# Patient Record
Sex: FEMALE | Race: ASIAN | NOT HISPANIC OR LATINO | Employment: FULL TIME | URBAN - METROPOLITAN AREA
[De-identification: names, ages, dates, MRNs, and addresses within clinical notes are randomized per-mention and may not be internally consistent; named-entity substitution may affect disease eponyms.]

---

## 2017-01-26 ENCOUNTER — HOSPITAL ENCOUNTER (OUTPATIENT)
Dept: RADIOLOGY | Facility: OTHER | Age: 37
Discharge: HOME | End: 2017-01-26
Attending: RADIOLOGY
Payer: COMMERCIAL

## 2017-01-26 DIAGNOSIS — D33.4 BENIGN TUMOR OF SPINAL CORD: ICD-10-CM

## 2017-01-26 PROCEDURE — 72156 MRI NECK SPINE W/O & W/DYE: CPT | Mod: 26,,, | Performed by: RADIOLOGY

## 2017-01-26 PROCEDURE — 72156 MRI NECK SPINE W/O & W/DYE: CPT | Mod: TC

## 2017-01-26 PROCEDURE — A9585 GADOBUTROL INJECTION: HCPCS | Performed by: RADIOLOGY

## 2017-01-26 PROCEDURE — 25500020 PHARM REV CODE 255: Performed by: RADIOLOGY

## 2017-01-26 RX ORDER — GADOBUTROL 604.72 MG/ML
10 INJECTION INTRAVENOUS
Status: COMPLETED | OUTPATIENT
Start: 2017-01-26 | End: 2017-01-26

## 2017-01-26 RX ADMIN — GADOBUTROL 10 ML: 604.72 INJECTION INTRAVENOUS at 11:01

## 2017-01-30 ENCOUNTER — OFFICE VISIT (OUTPATIENT)
Dept: RADIATION ONCOLOGY | Facility: CLINIC | Age: 37
End: 2017-01-30
Attending: RADIOLOGY
Payer: COMMERCIAL

## 2017-01-30 VITALS
HEIGHT: 66 IN | WEIGHT: 233 LBS | SYSTOLIC BLOOD PRESSURE: 114 MMHG | DIASTOLIC BLOOD PRESSURE: 76 MMHG | BODY MASS INDEX: 37.45 KG/M2 | RESPIRATION RATE: 16 BRPM | HEART RATE: 77 BPM

## 2017-01-30 DIAGNOSIS — D33.4 BENIGN TUMOR OF SPINAL CORD: Primary | ICD-10-CM

## 2017-01-30 PROCEDURE — 99499 UNLISTED E&M SERVICE: CPT | Mod: S$GLB,,, | Performed by: RADIOLOGY

## 2017-01-30 PROCEDURE — 99999 PR PBB SHADOW E&M-EST. PATIENT-LVL III: CPT | Mod: PBBFAC,,, | Performed by: RADIOLOGY

## 2017-01-30 NOTE — PROGRESS NOTES
Subjective:       Patient ID: Ann Godfrey is a 36 y.o. female.    Chief Complaint: malignant tumor of spinal cord (f/u after xrt)    HPI Comments: This patient returns for follow up visit.     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 She initially presented in 2008 with pain and numbness in the Lt. arm and leg. The patient was living in [place] at the time.  MRI at that time revealed a intramedullary spinal cord abnormality at the C2 level. She underwent laminectomy followed by a biopsy of the lesion which reportedly revealed a low grade glioma. She elected to proceed with active surveillance. The patient relocated to [place] and presented to [facility] in 2013 to establish care.  Recently she presented with increasing pain in the Lt. upper and lower extremities. Repeat MRI scan on 7/15/16 revealed a 1.4 x 0.7 x 2.4 cm T2 hyperintense lesion with mild expansion of the cord seen at the C2-C3 level. The lesion previously measured 0.4 x 0.7 x 0.6 mm. She was referred to our depart and completed a course of 54 Gy in 30 fractions from C1 - C4 vertebral bodies on 10/17/16.  Today, the patient states she continues to note numbness in the Lt. hand and Lower left leg.  There is also intermittent pain in the lateral aspect of the Rt. lower leg.  There is some weakness in the Lt. .  Overall, her symptoms are stable.        Review of Systems   Constitutional: Positive for fatigue. Negative for activity change, appetite change, chills and fever.   Respiratory: Negative for cough.    Cardiovascular: Negative for chest pain and palpitations.   Neurological: Positive for weakness and numbness. Negative for dizziness, tremors, seizures, facial asymmetry, speech difficulty, light-headedness and headaches.       Objective:      Physical Exam   Constitutional: She is oriented to person, place, and time. She appears well-developed and well-nourished. No distress.    Neurological: She is alert and oriented to person, place, and time. No cranial nerve deficit. GCS eye subscore is 4. GCS verbal subscore is 5. GCS motor subscore is 6.   Reflex Scores:       Tricep reflexes are 1+ on the right side and 1+ on the left side.       Bicep reflexes are 1+ on the right side and 1+ on the left side.       Patellar reflexes are 1+ on the right side and 1+ on the left side.  4.5 out of 5 strength in the Lt. .  5 of 5 in the remaining Rt. and Lt. upper extremities.  4 of 5 strength in the plantar flexion on the Lt.         Repeat MRI of the cervical spine on 1/26/17 revealed No significant interval change in the intradural lesion at C2-C3 the lesion still measures 1.4 cm x 7 mm.   Assessment:       1. Benign tumor of spinal cord        Plan:       Stable following radiotherapy.  Will plan repeat MRI and follow up in 6 months.

## 2017-02-15 ENCOUNTER — PATIENT MESSAGE (OUTPATIENT)
Dept: OBSTETRICS AND GYNECOLOGY | Facility: CLINIC | Age: 37
End: 2017-02-15

## 2017-03-01 ENCOUNTER — PATIENT MESSAGE (OUTPATIENT)
Dept: OBSTETRICS AND GYNECOLOGY | Facility: CLINIC | Age: 37
End: 2017-03-01

## 2017-03-02 ENCOUNTER — TELEPHONE (OUTPATIENT)
Dept: OBSTETRICS AND GYNECOLOGY | Facility: CLINIC | Age: 37
End: 2017-03-02

## 2017-03-02 NOTE — TELEPHONE ENCOUNTER
Returned pt call. She took a home pregnancy test, it was positive. Her LMP was 1/10/17. She wants to see Dr. Weber. The next available is 3/22. She is scheduled for 3/22 @ 8:45 @ [facility]. She will be 10 weeks. She wants to know if that was too long to come in, due to complications in the past. She complains of lower abdominal pain . It can get up to a 7 at times. She couldn't describe her pain. She said the pain is bearable. She said her pain comes and goes. I told her if the pain persist, she needs to go to the ER. I told her Dr. Weber will be out of the office until Monday. Pt verbalized understanding.

## 2017-03-07 ENCOUNTER — TELEPHONE (OUTPATIENT)
Dept: OBSTETRICS AND GYNECOLOGY | Facility: CLINIC | Age: 37
End: 2017-03-07

## 2017-03-07 NOTE — TELEPHONE ENCOUNTER
----- Message from Sharmila Veloz sent at 3/7/2017 11:20 AM CST -----  Contact: DYLLAN RIVERA [[id number]]  x_  1st Request  _  2nd Request  _  3rd Request        Who: DYLLAN RIVERA [[id number]]    Why: patient is returning a call     What Number to Call Back: [phone]    When to Expect a call back: (Before the end of the day)   -- if call after 3:00 call back will be tomorrow.

## 2017-03-08 NOTE — TELEPHONE ENCOUNTER
----- Message from Christian Molina sent at 3/7/2017  4:38 PM CST -----  Pt returning your call [phone]

## 2017-03-09 ENCOUNTER — TELEPHONE (OUTPATIENT)
Dept: OBSTETRICS AND GYNECOLOGY | Facility: CLINIC | Age: 37
End: 2017-03-09

## 2017-03-09 NOTE — TELEPHONE ENCOUNTER
----- Message from Valarie Spain MA sent at 3/9/2017  9:07 AM CST -----  Contact: patient   Patient states she was returning a call to [place]. Patient can best be reached at [phone]

## 2017-03-10 ENCOUNTER — TELEPHONE (OUTPATIENT)
Dept: OBSTETRICS AND GYNECOLOGY | Facility: CLINIC | Age: 37
End: 2017-03-10

## 2017-03-10 NOTE — TELEPHONE ENCOUNTER
----- Message from Christian Molina sent at 3/10/2017 10:42 AM CST -----  PT RETURNING YOUR CALL [phone]

## 2017-03-22 ENCOUNTER — OFFICE VISIT (OUTPATIENT)
Dept: OBSTETRICS AND GYNECOLOGY | Facility: CLINIC | Age: 37
End: 2017-03-22
Attending: OBSTETRICS & GYNECOLOGY
Payer: MEDICAID

## 2017-03-22 ENCOUNTER — LAB VISIT (OUTPATIENT)
Dept: LAB | Facility: OTHER | Age: 37
End: 2017-03-22
Attending: OBSTETRICS & GYNECOLOGY
Payer: MEDICAID

## 2017-03-22 VITALS
DIASTOLIC BLOOD PRESSURE: 80 MMHG | WEIGHT: 233 LBS | HEIGHT: 66 IN | SYSTOLIC BLOOD PRESSURE: 130 MMHG | BODY MASS INDEX: 37.45 KG/M2

## 2017-03-22 DIAGNOSIS — Z32.01 POSITIVE PREGNANCY TEST: ICD-10-CM

## 2017-03-22 DIAGNOSIS — N91.2 AMENORRHEA: Primary | ICD-10-CM

## 2017-03-22 LAB
ABO + RH BLD: NORMAL
B-HCG UR QL: POSITIVE
BASOPHILS # BLD AUTO: 0.02 K/UL
BASOPHILS NFR BLD: 0.3 %
BLD GP AB SCN CELLS X3 SERPL QL: NORMAL
CTP QC/QA: YES
DIFFERENTIAL METHOD: ABNORMAL
EOSINOPHIL # BLD AUTO: 0.2 K/UL
EOSINOPHIL NFR BLD: 3 %
ERYTHROCYTE [DISTWIDTH] IN BLOOD BY AUTOMATED COUNT: 13.7 %
HBV SURFACE AG SERPL QL IA: NEGATIVE
HCT VFR BLD AUTO: 38.8 %
HGB BLD-MCNC: 12.8 G/DL
HIV 1+2 AB+HIV1 P24 AG SERPL QL IA: NEGATIVE
LYMPHOCYTES # BLD AUTO: 1.3 K/UL
LYMPHOCYTES NFR BLD: 17.3 %
MCH RBC QN AUTO: 29 PG
MCHC RBC AUTO-ENTMCNC: 33 %
MCV RBC AUTO: 88 FL
MONOCYTES # BLD AUTO: 0.3 K/UL
MONOCYTES NFR BLD: 3.8 %
NEUTROPHILS # BLD AUTO: 5.8 K/UL
NEUTROPHILS NFR BLD: 75.3 %
PLATELET # BLD AUTO: 165 K/UL
PMV BLD AUTO: 12.3 FL
RBC # BLD AUTO: 4.42 M/UL
RPR SER QL: NORMAL
WBC # BLD AUTO: 7.73 K/UL

## 2017-03-22 PROCEDURE — 87591 N.GONORRHOEAE DNA AMP PROB: CPT

## 2017-03-22 PROCEDURE — 86762 RUBELLA ANTIBODY: CPT

## 2017-03-22 PROCEDURE — 87624 HPV HI-RISK TYP POOLED RSLT: CPT

## 2017-03-22 PROCEDURE — 87186 SC STD MICRODIL/AGAR DIL: CPT

## 2017-03-22 PROCEDURE — 86703 HIV-1/HIV-2 1 RESULT ANTBDY: CPT

## 2017-03-22 PROCEDURE — 86850 RBC ANTIBODY SCREEN: CPT

## 2017-03-22 PROCEDURE — 81025 URINE PREGNANCY TEST: CPT | Mod: PBBFAC | Performed by: OBSTETRICS & GYNECOLOGY

## 2017-03-22 PROCEDURE — 1160F RVW MEDS BY RX/DR IN RCRD: CPT | Mod: ,,, | Performed by: OBSTETRICS & GYNECOLOGY

## 2017-03-22 PROCEDURE — 86592 SYPHILIS TEST NON-TREP QUAL: CPT

## 2017-03-22 PROCEDURE — 76801 OB US < 14 WKS SINGLE FETUS: CPT | Mod: 26,S$PBB,, | Performed by: PEDIATRICS

## 2017-03-22 PROCEDURE — 99213 OFFICE O/P EST LOW 20 MIN: CPT | Mod: TH,S$PBB,, | Performed by: OBSTETRICS & GYNECOLOGY

## 2017-03-22 PROCEDURE — 87340 HEPATITIS B SURFACE AG IA: CPT

## 2017-03-22 PROCEDURE — 85025 COMPLETE CBC W/AUTO DIFF WBC: CPT

## 2017-03-22 PROCEDURE — 86900 BLOOD TYPING SEROLOGIC ABO: CPT

## 2017-03-22 PROCEDURE — 87086 URINE CULTURE/COLONY COUNT: CPT

## 2017-03-22 PROCEDURE — 88175 CYTOPATH C/V AUTO FLUID REDO: CPT

## 2017-03-22 PROCEDURE — 87088 URINE BACTERIA CULTURE: CPT

## 2017-03-22 PROCEDURE — 99213 OFFICE O/P EST LOW 20 MIN: CPT | Mod: PBBFAC,25 | Performed by: OBSTETRICS & GYNECOLOGY

## 2017-03-22 PROCEDURE — 86901 BLOOD TYPING SEROLOGIC RH(D): CPT

## 2017-03-22 PROCEDURE — 99999 PR PBB SHADOW E&M-EST. PATIENT-LVL III: CPT | Mod: PBBFAC,,, | Performed by: OBSTETRICS & GYNECOLOGY

## 2017-03-22 PROCEDURE — 87077 CULTURE AEROBIC IDENTIFY: CPT

## 2017-03-22 NOTE — MR AVS SNAPSHOT
"    [facility] [address]  [address]  [address]  [facility] [address]  Phone: [phone]  Fax: [phone]                  Ann Godfrey   3/22/2017 8:45 AM   Office Visit    Description:  Female : 1980   Provider:  Yamileth Weber MD   Department:  [facility] [address]           Reason for Visit     Amenorrhea     Morning Sickness           Diagnoses this Visit        Comments    Amenorrhea    -  Primary     Positive pregnancy test                To Do List           Goals (5 Years of Data)     None      Follow-Up and Disposition     Return in about 4 weeks (around 2017).      [facility] On Call     [facility] On Call Nurse Care Line -  Assistance  Registered nurses in the [facility] On Call Center provide clinical advisement, health education, appointment booking, and other advisory services.  Call for this free service at [phone].             Medications           Message regarding Medications     Verify the changes and/or additions to your medication regime listed below are the same as discussed with your clinician today.  If any of these changes or additions are incorrect, please notify your healthcare provider.        STOP taking these medications     gabapentin (NEURONTIN) 100 MG capsule Take 1 capsule (100 mg total) by mouth 3 (three) times daily.           Verify that the below list of medications is an accurate representation of the medications you are currently taking.  If none reported, the list may be blank. If incorrect, please contact your healthcare provider. Carry this list with you in case of emergency.           Current Medications     PRENATAL VIT #76/IRON,CARB/FA (PNV 29-1 ORAL) Take 1 tablet by mouth once daily.           Clinical Reference Information           Your Vitals Were     BP Height Weight Last Period BMI    130/80 5' 6" (1.676 m) 105.7 kg (233 lb 0.4 oz) 01/10/2017 (Exact Date) 37.61 kg/m2      Blood Pressure          Most Recent Value "    BP  130/80      Allergies as of 3/22/2017     No Known Allergies      Immunizations Administered on Date of Encounter - 3/22/2017     None      Orders Placed During Today's Visit      Normal Orders This Visit    C. trachomatis/N. gonorrhoeae by AMP DNA Urine     HPV High Risk Genotypes, PCR     Liquid-based pap smear, screening     POCT urine pregnancy     Urine culture     Future Labs/Procedures Expected by Expires    CBC auto differential  3/22/2017 3/22/2018    Hepatitis B surface antigen  3/22/2017 3/22/2018    HIV-1 and HIV-2 antibodies  3/22/2017 3/22/2018    RPR  3/22/2017 3/22/2018    Rubella antibody, IgG  3/22/2017 3/22/2018    Type & Screen - Ob Profile  As directed 4/21/2017    US OB/GYN Procedure ([facility])  As directed 3/22/2018         3/22/2017  9:11 AM - Moj[facility] Carrillo LPN      Component Results     Component Value Flag Ref Range Units Status    POC Preg Test, Ur Positive (A) Negative  Final     Acceptable Yes    Final            Language Assistance Services     ATTENTION: Language assistance services are available, free of charge. Please call [phone].      ATENCIÓN: Si habla español, tiene a moses disposición servicios gratuitos de asistencia lingüística. Llame al [phone].     CHÚ Ý: N?u b?n nói Ti?ng Vi?t, có các d?ch v? h? tr? ngôn ng? mi?n phí dành cho b?n. G?i s? [phone].         [facility] [address] complies with applicable Federal civil rights laws and does not discriminate on the basis of race, color, national origin, age, disability, or sex.

## 2017-03-22 NOTE — PROGRESS NOTES
SUBJECTIVE:   36 y.o. female  complains of amenorrhea.  +UPT.  She is taking a pnv.        Past Medical History:   Diagnosis Date    Arthritis     spinal    Asthma     was on advair , resolved    Benign tumor of spinal cord     Depression     GERD (gastroesophageal reflux disease)      Past Surgical History:   Procedure Laterality Date    APPENDECTOMY       SECTION      posteriorlaminectomy      For benign spinal tumor;      Social History     Social History    Marital status:      Spouse name: N/A    Number of children: N/A    Years of education: N/A     Occupational History    Not on file.     Social History Main Topics    Smoking status: Former Smoker     Packs/day: 1.00     Years: 13.00     Types: Cigarettes     Quit date: 10/20/2012    Smokeless tobacco: Former User    Alcohol use No    Drug use: No    Sexual activity: Yes     Partners: Male     Birth control/ protection: None     Other Topics Concern    Not on file     Social History Narrative     Family History   Problem Relation Age of Onset    Liver disease Father     Breast cancer Neg Hx     Colon cancer Neg Hx     Ovarian cancer Neg Hx     Stroke Neg Hx     Diabetes Neg Hx     Hypertension Neg Hx      OB History    Para Term  AB SAB TAB Ectopic Multiple Living   2 1 1 0 0 0 0 0 0 1      # Outcome Date GA Lbr King/2nd Weight Sex Delivery Anes PTL Lv   2 Current            1 Term 14 40w5d  4.326 kg (9 lb 8.6 oz) F CS-LTranv EPI N Y            Current Outpatient Prescriptions   Medication Sig Dispense Refill    PRENATAL VIT #76/IRON,CARB/FA (PNV 29-1 ORAL) Take 1 tablet by mouth once daily.       No current facility-administered medications for this visit.      Allergies: Review of patient's allergies indicates no known allergies.     ROS:  Constitutional: no weight loss, weight gain, fever, fatigue  Eyes:  No vision changes, glasses/contacts  ENT/Mouth: No ulcers, sinus problems, ears  "ringing, headache  Cardiovascular: No inability to lie flat, chest pain, exercise intolerance, swelling, heart palpitations  Respiratory: No wheezing, coughing blood, shortness of breath, or cough  Gastrointestinal: No diarrhea, bloody stool, nausea/vomiting, constipation, gas, hemorrhoids  Genitourinary: No blood in urine, painful urination, urgency of urination, frequency of urination, incomplete emptying, incontinence, abnormal bleeding, painful periods, heavy periods, vaginal discharge, vaginal odor, painful intercourse, sexual problems, bleeding after intercourse.  Musculoskeletal: No muscle weakness  Skin/Breast: No painful breasts, nipple discharge, masses, rash, ulcers  Neurological: No passing out, seizures, numbness, headache  Endocrine: No diabetes, hypothyroid, hyperthyroid, hot flashes, hair loss, abnormal hair growth, ance  Psychiatric: No depression, crying  Hematologic: No bruises, bleeding, swollen lymph nodes, anemia.      OBJECTIVE:   The patient appears well, alert, oriented x 3, in no distress.  /80  Ht 5' 6" (1.676 m)  Wt 105.7 kg (233 lb 0.4 oz)  LMP 01/10/2017 (Exact Date)  BMI 37.61 kg/m2  NECK: no thyromegaly, trachea midline  SKIN: no acne, striae, hirsutism  BREAST EXAM: breasts appear normal, no suspicious masses, no skin or nipple changes or axillary nodes  ABDOMEN: no hernias, masses, or hepatosplenomegaly  GENITALIA: normal external genitalia, no erythema, no discharge  URETHRA: normal urethra, normal urethral meatus  VAGINA: Normal  CERVIX: no lesions or cervical motion tenderness  UTERUS: enlarged, 10 weeks size  ADNEXA: normal adnexa and no mass, fullness, tenderness    \  ASSESSMENT:   Ann was seen today for amenorrhea and morning sickness.    Diagnoses and all orders for this visit:    Amenorrhea  -     POCT urine pregnancy  -     Liquid-based pap smear, screening  -     HPV High Risk Genotypes, PCR    Positive pregnancy test  -     HIV-1 and HIV-2 antibodies; " Future  -     RPR; Future  -     Type & Screen - Ob Profile; Future  -     Hepatitis B surface antigen; Future  -     Rubella antibody, IgG; Future  -     Urine culture  -     C. trachomatis/N. gonorrhoeae by AMP DNA Urine  -     CBC auto differential; Future  -     US OB/GYN Procedure ([facility]); Future      Counseled to avoid cat litter, not garden without gloves, avoid raw meat, heat up deli meat, to eat large fish like tuna no more than once a week, and to avoid soft unpasteurized cheeses.  I recommend a PNV daily.  She should avoid ibuprofen.  Will discuss genetic screening after ultrasound.

## 2017-03-22 NOTE — PROCEDURES
Procedures Indication  ========    Estimation of gestational age.    Method  ======    Transabdominal ultrasound examination. View: Sufficient. Suboptimal view: limited by maternal body habitus.    Pregnancy  =========    Padron pregnancy. Number of fetuses: 1.    Dating  ======    LMP on: 1/10/2017  GA by LMP 10 w + 1 d  ABIEL by LMP: 10/17/2017  Ultrasound examination on: 3/22/2017  GA by U/S based upon: CRL  GA by U/S 11 w + 1 d  ABIEL by U/S: 10/10/2017  Assigned: Dating performed on 03/22/2017, based on the LMP  Assigned GA 10 w + 1 d  Assigned ABIEL: 10/17/2017    General Evaluation  ==============    Cardiac activity: present.    Fetal Biometry  ============    CRL 41.8 mm 98% 11w 1d Hadlock   bpm 64% Nicolaides    Fetal Anatomy  ============    The following structures appear normal:  Abdominal wall.    The following structures could not be adequately visualized:  Cranium. Thorax. GI tract. Urogenital tract.    The following structures were visualized:  Arms. Legs.    Maternal Structures  ===============    Uterus / Cervix  Uterus: Normal  Ovaries / Tubes / Adnexa  Rt ovary: Normal  Rt ovary D1 4.4 cm  Rt ovary D2 3.5 cm  Rt ovary D3 4.2 cm  Rt ovary mean 4.0 cm  Rt ovary vol 33.0 cm³  Rt ovarian cyst(s): Cysts identified  Findings: Simple cyst  D1 26.0 mm  D2 29.0 mm  D3 24.0 mm  Mean 26.3 mm  Vol 9.475 cm³  Lt ovary: Normal  Lt ovary D1 3.2 cm  Lt ovary D2 3.1 cm  Lt ovary D3 2.1 cm  Lt ovary mean 2.8 cm  Lt ovary vol 10.6 cm³  Pouch of Brendon / Other Structures  Free fluid: No free fluid visualized    Impression  =========    Single viable intrauterine pregnancy consistent with LMP dating per ACOG criteria.    Embryo grossly WNL with normal cardiac activity.    Normal uterus, cervix and adnexae as noted above.  No fluid seen in cul-de-sac.    Recommendation  ==============    Suggest repeat scan as you feel clinically indicated.

## 2017-03-23 LAB
C TRACH DNA SPEC QL NAA+PROBE: NOT DETECTED
N GONORRHOEA DNA SPEC QL NAA+PROBE: NOT DETECTED
RUBV IGG SER-ACNC: 25.6 IU/ML
RUBV IGG SER-IMP: REACTIVE

## 2017-03-24 LAB
HPV16 DNA SPEC QL NAA+PROBE: NEGATIVE
HPV16+18+H RISK 12 DNA CVX-IMP: NEGATIVE
HPV18 DNA SPEC QL NAA+PROBE: NEGATIVE

## 2017-03-25 LAB — BACTERIA UR CULT: NORMAL

## 2017-03-27 ENCOUNTER — PATIENT MESSAGE (OUTPATIENT)
Dept: OBSTETRICS AND GYNECOLOGY | Facility: CLINIC | Age: 37
End: 2017-03-27

## 2017-03-27 RX ORDER — NITROFURANTOIN (MACROCRYSTALS) 100 MG/1
100 CAPSULE ORAL EVERY 6 HOURS
Qty: 28 CAPSULE | Refills: 0 | Status: SHIPPED | OUTPATIENT
Start: 2017-03-27 | End: 2017-04-19

## 2017-04-19 ENCOUNTER — INITIAL PRENATAL (OUTPATIENT)
Dept: OBSTETRICS AND GYNECOLOGY | Facility: CLINIC | Age: 37
End: 2017-04-19
Attending: OBSTETRICS & GYNECOLOGY
Payer: COMMERCIAL

## 2017-04-19 VITALS — WEIGHT: 233 LBS | DIASTOLIC BLOOD PRESSURE: 80 MMHG | BODY MASS INDEX: 37.61 KG/M2 | SYSTOLIC BLOOD PRESSURE: 120 MMHG

## 2017-04-19 DIAGNOSIS — O99.212 OBESITY COMPLICATING PREGNANCY, SECOND TRIMESTER: ICD-10-CM

## 2017-04-19 DIAGNOSIS — O09.92 PREGNANCY, SUPERVISION, HIGH-RISK, SECOND TRIMESTER: Primary | ICD-10-CM

## 2017-04-19 DIAGNOSIS — C72.0 GLIOMA OF SPINAL CORD: ICD-10-CM

## 2017-04-19 PROBLEM — O99.210 OBESITY COMPLICATING PREGNANCY: Status: ACTIVE | Noted: 2017-04-19

## 2017-04-19 PROBLEM — O09.90 PREGNANCY, SUPERVISION, HIGH-RISK: Status: ACTIVE | Noted: 2017-04-19

## 2017-04-19 PROCEDURE — 99999 PR PBB SHADOW E&M-EST. PATIENT-LVL III: CPT | Mod: PBBFAC,,, | Performed by: OBSTETRICS & GYNECOLOGY

## 2017-04-19 PROCEDURE — 99213 OFFICE O/P EST LOW 20 MIN: CPT | Mod: TH,S$PBB,, | Performed by: OBSTETRICS & GYNECOLOGY

## 2017-04-19 PROCEDURE — 99213 OFFICE O/P EST LOW 20 MIN: CPT | Mod: PBBFAC | Performed by: OBSTETRICS & GYNECOLOGY

## 2017-04-19 NOTE — MR AVS SNAPSHOT
[facility] [place] [address]  [address]  [address]  [facility] [address]  Phone: [phone]  Fax: [phone]                  Ann Godfrey   2017 9:00 AM   Initial Prenatal    Description:  Female : 1980   Provider:  Yamileth Weber MD   Department:  [facility] [place] [address]           Reason for Visit     Routine Prenatal Visit           Diagnoses this Visit        Comments    Glioma of spinal cord    -  Primary     Pregnancy, supervision, high-risk, second trimester         Obesity complicating pregnancy, second trimester                To Do List           Goals (5 Years of Data)     None      Follow-Up and Disposition     Return in about 4 weeks (around 2017).      [facility] On Call     [facility] On Call Nurse Care Line -  Assistance  Unless otherwise directed by your provider, please contact [facility] On-Call, our nurse care line that is available for  assistance.     Registered nurses in the [facility] On Call Center provide: appointment scheduling, clinical advisement, health education, and other advisory services.  Call: [phone] (toll free)               Medications           Message regarding Medications     Verify the changes and/or additions to your medication regime listed below are the same as discussed with your clinician today.  If any of these changes or additions are incorrect, please notify your healthcare provider.        STOP taking these medications     nitrofurantoin (MACRODANTIN) 100 MG capsule Take 1 capsule (100 mg total) by mouth every 6 (six) hours.           Verify that the below list of medications is an accurate representation of the medications you are currently taking.  If none reported, the list may be blank. If incorrect, please contact your healthcare provider. Carry this list with you in case of emergency.           Current Medications     PRENATAL VIT #76/IRON,CARB/FA (PNV 29-1 ORAL) Take 1 tablet by mouth once daily.            Clinical Reference Information           Prenatal Vitals     Enc. Date GA Prenatal Vitals Prenatal Pulse Pain Level Urine Albumin/Glucose Edema Presentation Dilation/Effacement/Station    17 14w1d 120/80 / 105.7 kg (233 lb 0.4 oz)   0 Negative / Negative None / None / None         TW.012 kg (0.4 oz)   Pregravid weight: 105.7 kg (233 lb)   Number of fetuses: 1       Your Vitals Were     BP Weight Last Period BMI       120/80 105.7 kg (233 lb 0.4 oz) 01/10/2017 (Exact Date) 37.61 kg/m2       Allergies as of 2017     No Known Allergies      Immunizations Administered on Date of Encounter - 2017     None      Orders Placed During Today's Visit      Normal Orders This Visit    Ambulatory consult to Maternal Fetal Medicine     Ambulatory consult to OB Anesthesiology     Future Labs/Procedures Expected by Expires    [facility] Procedure ([facility])  As directed 2018      Language Assistance Services     ATTENTION: Language assistance services are available, free of charge. Please call [phone].      ATENCIÓN: Si habla kp, tiene a moses disposición servicios gratuitos de asistencia lingüística. Llame al [phone].     CHÚ Ý: N?u b?n nói Ti?ng Vi?t, có các d?ch v? h? tr? ngôn ng? mi?n phí dành cho b?n. G?i s? [phone].         [facility] [address] complies with applicable Federal civil rights laws and does not discriminate on the basis of race, color, national origin, age, disability, or sex.

## 2017-05-17 ENCOUNTER — ANESTHESIA (OUTPATIENT)
Dept: ANESTHESIOLOGY | Facility: OTHER | Age: 37
End: 2017-05-17

## 2017-05-17 ENCOUNTER — ROUTINE PRENATAL (OUTPATIENT)
Dept: OBSTETRICS AND GYNECOLOGY | Facility: CLINIC | Age: 37
End: 2017-05-17
Attending: OBSTETRICS & GYNECOLOGY
Payer: COMMERCIAL

## 2017-05-17 ENCOUNTER — OFFICE VISIT (OUTPATIENT)
Dept: ANESTHESIOLOGY | Facility: OTHER | Age: 37
End: 2017-05-17
Attending: OBSTETRICS & GYNECOLOGY
Payer: MEDICAID

## 2017-05-17 ENCOUNTER — ANESTHESIA EVENT (OUTPATIENT)
Dept: ANESTHESIOLOGY | Facility: OTHER | Age: 37
End: 2017-05-17

## 2017-05-17 VITALS
SYSTOLIC BLOOD PRESSURE: 100 MMHG | DIASTOLIC BLOOD PRESSURE: 76 MMHG | OXYGEN SATURATION: 98 % | WEIGHT: 238.13 LBS | BODY MASS INDEX: 38.43 KG/M2 | DIASTOLIC BLOOD PRESSURE: 72 MMHG | HEART RATE: 78 BPM | SYSTOLIC BLOOD PRESSURE: 120 MMHG

## 2017-05-17 DIAGNOSIS — O99.212 OBESITY COMPLICATING PREGNANCY, SECOND TRIMESTER: ICD-10-CM

## 2017-05-17 DIAGNOSIS — C71.9 GLIOMA: ICD-10-CM

## 2017-05-17 DIAGNOSIS — O09.92 PREGNANCY, SUPERVISION, HIGH-RISK, SECOND TRIMESTER: Primary | ICD-10-CM

## 2017-05-17 DIAGNOSIS — O34.219 HISTORY OF CESAREAN SECTION COMPLICATING PREGNANCY: ICD-10-CM

## 2017-05-17 PROCEDURE — 0502F SUBSEQUENT PRENATAL CARE: CPT | Mod: S$GLB,,, | Performed by: OBSTETRICS & GYNECOLOGY

## 2017-05-17 PROCEDURE — 99999 PR PBB SHADOW E&M-EST. PATIENT-LVL II: CPT | Mod: PBBFAC,,, | Performed by: OBSTETRICS & GYNECOLOGY

## 2017-05-17 NOTE — MR AVS SNAPSHOT
[facility] [address]  [address]  [address]  [facility] [address]  Phone: [phone]  Fax: [phone]                  Ann Godfrey   2017 8:45 AM   Routine Prenatal    Description:  Female : 1980   Provider:  Yamileth Weber MD   Department:  [facility] [address]           Reason for Visit     Routine Prenatal Visit     Epistaxis           Diagnoses this Visit        Comments    Pregnancy, supervision, high-risk, second trimester    -  Primary     Obesity complicating pregnancy, second trimester         History of  section complicating pregnancy                To Do List           Future Appointments        Provider Department Dept Phone    2017 8:45 AM Yamileth Weber MD [facility] [address] [phone]    2017 9:40 AM ULTRASOUND, [facility] 4TH FLR ON CALL [facility] Maternal Fetal Med [phone]      Goals (5 Years of Data)     None      Follow-Up and Disposition     Return in about 4 weeks (around 2017).      [facility] On Call     [facility] On Call Nurse Care Line -  Assistance  Unless otherwise directed by your provider, please contact [facility] On-Call, our nurse care line that is available for  assistance.     Registered nurses in the [facility] On Call Center provide: appointment scheduling, clinical advisement, health education, and other advisory services.  Call: [phone] (toll free)               Medications           Message regarding Medications     Verify the changes and/or additions to your medication regime listed below are the same as discussed with your clinician today.  If any of these changes or additions are incorrect, please notify your healthcare provider.             Verify that the below list of medications is an accurate representation of the medications you are currently taking.  If none reported, the list may be blank. If incorrect, please contact your healthcare provider. Carry this list with you in case of  emergency.           Current Medications     PRENATAL VIT #76/IRON,CARB/FA (PNV 29-1 ORAL) Take 1 tablet by mouth once daily.           Clinical Reference Information           Prenatal Vitals     Enc. Date GA Prenatal Vitals Prenatal Pulse Pain Level Urine Albumin/Glucose Edema Presentation Dilation/Effacement/Station    17 18w1d 100/72 / 108 kg (238 lb 1.6 oz)   0 Negative / Negative       17 14w1d 120/80 / 105.7 kg (233 lb 0.4 oz)   0 Negative / Negative None / None / None         TW.312 kg (5 lb 1.6 oz)   Pregravid weight: 105.7 kg (233 lb)   Number of fetuses: 1       Your Vitals Were     BP Weight Last Period BMI       100/72 108 kg (238 lb 1.6 oz) 01/10/2017 (Exact Date) 38.43 kg/m2       Allergies as of 2017     No Known Allergies      Immunizations Administered on Date of Encounter - 2017     None      Language Assistance Services     ATTENTION: Language assistance services are available, free of charge. Please call [phone].      ATENCIÓN: Si habla español, tiene a moses disposición servicios gratuitos de asistencia lingüística. Llame al [phone].     CHÚ Ý: N?u b?n nói Ti?ng Vi?t, có các d?ch v? h? tr? ngôn ng? mi?n phí dành cho b?n. G?i s? [phone].         [facility] [address] complies with applicable Federal civil rights laws and does not discriminate on the basis of race, color, national origin, age, disability, or sex.

## 2017-05-17 NOTE — CONSULTS
Consults     [facility]    Date:    2017  8:56 AM     Anesthesia Consult: outpatient    Initial Consultation: Yes - cervical spinal cord glioma    Requested by: Obstetrician / MFM - Yamileth Weber  Consult documentation sent back to physician.      Chief Complaint: spinal cord glioma with associated intermittent left sided numbness/paresis    Diagnosis: spinal cord glioma    Reason for Consult: Anesthetic recommendations for delivery    Allergies:  Review of patient's allergies indicates no known allergies.    History of Present Illness:    Patient is a 36 years old,  female, Previous pregnancies: 1.  Live births: 1.  Miscarriages: 0 . Elective abortions: 0. diagnosed with benign cervical spinal cord glioma.      Past medical history:    Past Medical History:   Diagnosis Date    Arthritis     spinal    Asthma     was on advair , resolved    Benign tumor of spinal cord     Depression     GERD (gastroesophageal reflux disease)        Past surgical history:    Past Surgical History:   Procedure Laterality Date    APPENDECTOMY       SECTION      posteriorlaminectomy      For benign spinal tumor;        Family history:    Family History   Problem Relation Age of Onset    Liver disease Father     Breast cancer Neg Hx     Colon cancer Neg Hx     Ovarian cancer Neg Hx     Stroke Neg Hx     Diabetes Neg Hx     Hypertension Neg Hx        Social History:    Social History     Social History    Marital status:      Spouse name: N/A    Number of children: N/A    Years of education: N/A     Occupational History    Not on file.     Social History Main Topics    Smoking status: Former Smoker     Packs/day: 1.00     Years: 13.00     Types: Cigarettes     Quit date: 10/20/2012    Smokeless tobacco: Former User    Alcohol use No    Drug use: No    Sexual activity: Yes     Partners: Male     Birth control/ protection: None     Other Topics Concern    Not on file      Social History Narrative     Medication:    Current Outpatient Prescriptions on File Prior to Visit   Medication Sig Dispense Refill    PRENATAL VIT #76/IRON,CARB/FA (PNV 29-1 ORAL) Take 1 tablet by mouth once daily.       No current facility-administered medications on file prior to visit.          Past anesthesia history:    Hx of general anesthesia problems: No    Diagnostic Studies    I have reviewed the following. Relevant findings as noted:    Blood group: O POS   CBC:   Lab Results   Component Value Date    WBC 7.73 03/22/2017    RBC 4.42 03/22/2017    HGB 12.8 03/22/2017    HCT 38.8 03/22/2017     03/22/2017     BMP:   Lab Results   Component Value Date     (H) 07/08/2015     (H) 07/08/2015     07/08/2015     07/08/2015    K 4.1 07/08/2015    K 4.1 07/08/2015     07/08/2015     07/08/2015    CO2 23 07/08/2015    CO2 23 07/08/2015    BUN 8 07/08/2015    BUN 8 07/08/2015    CREATININE 0.6 07/08/2015    CREATININE 0.6 07/08/2015    CALCIUM 9.4 07/08/2015    CALCIUM 9.4 07/08/2015    PROT 7.5 07/08/2015    ALBUMIN 4.0 07/08/2015     Coagulation: No results found for: INR, APTT      MRI:   Findings: There is no significant interval change in size in the T2 hyperintense, nonenhancing intradural lesions the cervical cord at C2-C3.  Today it measures 1.4 cm x 7 mm which is stable.  No other spinal cord or central canal lesions present on this exam.  The craniocervical junction shows no abnormalities.    There has been interval development of abnormal hyperintense signal throughout the C2, C3 and C4 vertebral bodies.  The remaining vertebral bodies maintain normal signal.  The vertebral bodies maintain normal height and alignment.  Disc desiccation is mild but is noted at C3-C4, C4-C5 and C6-C7.  Evaluation of the localizer images and structures surrounding the cervical spine show no abnormalities.  No abnormal enhancement identified on this exam.    The name she will  levels have no significant disc or joint pathology.    C3-C4: Mild diffuse disc bulge asymmetric to the right which causes no central canal or neuroforaminal stenosis.    C4-C5: Small bilateral disc osteophyte complexes right greater left which causes mild right neural foraminal stenosis and effacement of the anterior thecal sleeve.  The central canal and left neural foramen are patent.    C5-C6: No significant discogenic pathology.    C6-C7: Broad-based diffuse disc bulge causes flattening of the anterior thecal sleeve.  The central canal and neural foramina are patent.    C7-T1: No significant discogenic pathology.   Impression     1.  No significant interval change in the intradural lesion at C2-C3 in this patient status post recent radiation.  The changes in the bone marrow signal intensity at C3-C4 are likely related to radiation  2.  Mild degenerative disc and joint disease in the cervical spine.  3.  No distant lesions or additional neoplastic findings.         Review of Systems     Constitution: no weight loss, fever, night sweats  Eyes:  no recent visual changes  ENT:  None  Respiratory:  None  Cardiovascular:        METS: >4 - 6 - Slow swim / slow jog / walk 15 min. mile   Hematology: none  Gastrointestinal:  none   Genitourinary:  None  Musculoskeletal:  Back pain / stiffness  Neurologic:  Tingling, numbess in left side (upper and lower extremity) and Weakness in left side (upper and lower)  Psych:No depression, No anxiety and No psychosis  Endocrine:  None                  Physical Examination:     · Vital signs:  /76 (BP Location: Right arm, Patient Position: Sitting, BP Method: Automatic)  Pulse 78  LMP 01/10/2017 (Exact Date)  SpO2 98%       General appearance: healthy, alert, no distresswell developed, well nourished female  Pulm: lungs clear to auscultation, breath sounds equal and symmetric, no rhonchi, rales or wheezes  Cardiac: no murmus/gallops/rubs, normal S1 and S2 heart sounds,  regular rate and rhythm  Abdomen: soft, non-tender, without masses or organomegaly, gravid  Neuro: normal without focal findings, mental status, speech normal, alert and oriented x3, PILAR and reflexes normal and symmetric  Musculoskeletal: limited neck extension  Skin: negative for - jaundice, spider hemangioma, telangiectasia, palmar erythema, ecchymosis and atrophy  Lymphatic: No abnormally enlarged lymph nodes.  Psych: oriented to time, place and person  Airway: negative IV (only hard palate visible), improves to II with phonation, small mouth opening, anterior airway      Problem Assessment    ASA 3 - Patient with moderate systemic disease with functional limitations    History of present disease is positive for:  Cervical spinal cord glioma. Pt had tumor resected in [place] in 2008 but it had enlarged since. She delivered via CS secondary to failed IOL with epidural. She denies any complications following epidural placement but also denies having many of the symptoms she now has with her tumor. She states her symptoms worsened in 2016 and she now reports back and neck pain with intermittent numbness and paralysis of entire left side. She states she cannot lie flat and that she develops symptoms when she does. She also has limited extension of neck. Pt following with neurology here but states her neurologist is currently unaware of her pregnancy and is unsure of whether she has seen neurosurgery here. She was worked up for MS but reports that it was ruled out and they feel all of her symptoms are secondary to the tumor.      Plans & Recommendations    Our anesthesia care plan consists of neuraxial anesthesia if cleared by neurology and neurosurgery. Given her airway exam and difficulty with positioning pt as to try to minimize exacerbating her symptoms, we would prefer to not place the patient under general anesthesia.If awake under neuraxial, patient would be able to help with positioning, make us aware  if symptoms begin to develop, and we could avoid manipulating her neck during airway placement.  However, we discussed that we would need clearance from neurology and neurosurgery as neuraxial is not without risks. We discussed the possibility of either anesthetic possibly exacerbating her symptoms temporarily vs permanently.     Complexity: moderate    Risks:surgical positioning, neuraxial vs GA neurological complications    Entertained and answered all question to the patient's and family's satisfaction.   Additional Diagnostic Testing discussed Patient needs neurology and neurosurgery consult/evaluation.           Gabby Gary MD

## 2017-05-17 NOTE — PROGRESS NOTES
Good fm.  Denies ctx, vb, lof.  Anatomy scheduled.  She has met with anesthesia and will meet with neurology

## 2017-05-29 ENCOUNTER — OFFICE VISIT (OUTPATIENT)
Dept: MATERNAL FETAL MEDICINE | Facility: CLINIC | Age: 37
End: 2017-05-29
Attending: OBSTETRICS & GYNECOLOGY
Payer: MEDICAID

## 2017-05-29 VITALS — WEIGHT: 235.44 LBS | DIASTOLIC BLOOD PRESSURE: 82 MMHG | SYSTOLIC BLOOD PRESSURE: 122 MMHG | BODY MASS INDEX: 38 KG/M2

## 2017-05-29 DIAGNOSIS — Z36.89 ENCOUNTER FOR ULTRASOUND TO CHECK FETAL GROWTH: ICD-10-CM

## 2017-05-29 DIAGNOSIS — O09.92 PREGNANCY, SUPERVISION, HIGH-RISK, SECOND TRIMESTER: ICD-10-CM

## 2017-05-29 DIAGNOSIS — C72.0 GLIOMA OF SPINAL CORD: ICD-10-CM

## 2017-05-29 DIAGNOSIS — O09.522 ELDERLY MULTIGRAVIDA IN SECOND TRIMESTER: ICD-10-CM

## 2017-05-29 PROCEDURE — 99999 PR PBB SHADOW E&M-EST. PATIENT-LVL II: CPT | Mod: PBBFAC,,, | Performed by: PEDIATRICS

## 2017-05-29 PROCEDURE — 76811 OB US DETAILED SNGL FETUS: CPT | Mod: PBBFAC | Performed by: PEDIATRICS

## 2017-05-29 PROCEDURE — 76811 OB US DETAILED SNGL FETUS: CPT | Mod: 26,S$PBB,, | Performed by: PEDIATRICS

## 2017-05-29 PROCEDURE — 99212 OFFICE O/P EST SF 10 MIN: CPT | Mod: PBBFAC | Performed by: PEDIATRICS

## 2017-05-29 PROCEDURE — 99214 OFFICE O/P EST MOD 30 MIN: CPT | Mod: S$PBB,TH,25, | Performed by: PEDIATRICS

## 2017-05-29 NOTE — LETTER
May 30, 2017      Yamileth Weber MD  [address]  [address]  [place] [address]           [facility]  [address]  [facility] [address]  Phone: [phone]          Patient: Ann Godfrey   MR Number: [id number]   YOB: 1980   Date of Visit: 5/29/2017       Dear Dr. Yamileth Weber:    Thank you for referring Ann Godfrey to me for evaluation. Attached you will find relevant portions of my assessment and plan of care.    If you have questions, please do not hesitate to call me. I look forward to following Ann Godfrey along with you.    Sincerely,    Divya Traore MD    Enclosure  CC:  No Recipients    If you would like to receive this communication electronically, please contact [email or web address] or [phone] to request more information on [facility] Link access.    For providers and/or their staff who would like to refer a patient to [facility], please contact us through our one-stop-shop provider referral line, [facility], at [phone].    If you feel you have received this communication in error or would no longer like to receive these types of communications, please e-mail [email or web address]

## 2017-05-30 PROBLEM — O09.522 ELDERLY MULTIGRAVIDA IN SECOND TRIMESTER: Status: ACTIVE | Noted: 2017-05-30

## 2017-05-30 NOTE — PROGRESS NOTES
Indication  ========    Consultation: Fetal anatomy survey.    History  ======    Risk Factors  History risk factors: AMA  Details: Benign tumor of spinal cord  History risk factors: Obesity    Pregnancy History  ==============    Maternal Lab Tests  Result: declined screening  Wants to know gender: yes    Maternal Assessment  =================    Weight 107 kg  Weight (lb) 236 lb  BP syst 122 mmHg  BP diast 82 mmHg    Method  ======    Transabdominal ultrasound examination. View: Subopti. Suboptimal view: limited by maternal body habitus. mal view: limited by fetal position.      Pregnancy  =========    Padron pregnancy. Number of fetuses: 1.    Dating  ======    Cycle: regular cycle  Ultrasound examination on: 5/29/2017  GA by U/S based upon: AC, BPD, Femur, HC  GA by U/S 21 w + 2 d  ABIEL by U/S: 10/7/2017  Assigned: Dating performed on 03/22/2017, based on the LMP  Assigned GA 19 w + 6 d  Assigned ABIEL: 10/17/2017    General Evaluation  ==============    Cardiac activity: present.  bpm.  Fetal movements: visualized.  Presentation: breech.  Placenta: anterior, fundal.  Umbilical cord: 3 vessel cord, normal.  Amniotic fluid: normal amount.    Fetal Biometry  ============    Fetal Biometry  BPD 50.6 mm 21w 2d Hadlock  OFD 66.1 mm 22w 2d Adrianne  .7 mm 21w 1d Hadlock  .5 mm 21w 6d Hadlock  Femur 33.6 mm 20w 4d Hadlock  Cerebellum tr 22.1 mm 21w 4d [place]  CM 5.3 mm  Nuchal fold 4.46 mm  Humerus 35.6 mm 22w 2d Adrianne   g 98% 21w 1d Hadlock  Calculated by: Hadlock (BPD-HC-AC-FL)  EFW (lb) 0 lb  EFW (oz) 14 oz  Cephalic index 0.77  HC / AC 1.12  FL / BPD 0.66  FL / AC 0.20   bpm  Head / Face / Neck   5.8 mm  Nasal bone 7.8 mm    Fetal Anatomy  ============    Cranium: normal  Lateral ventricles: normal  Choroid plexus: normal  Midline falx: normal  Cavum septi pellucidi: normal  Cerebellum: normal  Cisterna magna: normal  Head shape: normal  Rt lateral ventricle: normal  Lt lateral  ventricle: normal  Rt choroid plexus: normal  Lt choroid plexus: normal  Parenchyma: normal  Third ventricle: normal  Posterior fossa: normal  Cerebellar lobes: normal  Vermis: normal  Neck: appears normal  Nuchal fold: normal  Lips: normal  Profile: normal  Nose: normal  Maxilla: normal  Mandible: normal  4-chamber view: normal  RVOT: normal  LVOT: normal  Heart / Thorax: Septal views: normal  Situs: normal  Aortic arch: normal  Ductal arch: normal  SVC: suboptimal  IVC: suboptimal  3-vessel view: suboptimal  3-vessel-trachea view: suboptimal  Cardiac position: normal  Cardiac axis: normal  Cardiac size: normal  Cardiac rhythm: normal  Rt lung: normal  Lt lung: normal  Diaphragm: normal  Cord insertion: normal  Stomach: normal  Kidneys: normal  Bladder: normal  Genitals: normal  Abdom. wall: appears normal  Abdom. cavity: normal  Rt kidney: normal  Lt kidney: normal  Liver: normal  Bowel: normal  Cervical spine: normal  Thoracic spine: normal  Lumbar spine: normal  Sacral spine: normal  Arms: both visualized  Legs: both visualized  Rt arm: normal  Lt arm: normal  Rt hand: normal  Lt hand: normal  Rt leg: normal  Lt leg: normal  Rt foot: normal  Lt foot: normal  Position of hands: normal  Position of feet: normal  Gender: female  Wants to know gender: yes    Maternal Structures  ===============    Uterus / Cervix  Cervical length 46.8 mm  Other: Uterus and adnexa normal          Consultation  ==========      Type: Chief complaint: Advanced maternal age in pregnancy  .  Provider requesting consultation: Dr. Weber  CONSULTANT:  Divya Traore MD      Ms. Ann Godfrey, a [age] yo  at 19-6/7 weeks', is referred for consultation regarding AMA. Ms. Godfrey has declined genetic  screening.    PMH:    Significant for GERD, AMA, Obesity, Asthma, Arthritis, and a benign tumor of the spinal cord.    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 She presented in  with pain and  numbness in the left arm  and leg. MRI at that time revealed a intramedullary spinal cord abnormality at the C2 level. She underwent laminectomy followed by a biopsy of  the lesion which reportedly revealed a low grade glioma. She elected to proceed with active surveillance. Recently she presented with  increasing pain in the left upper and lower extremities. Repeat MRI scan on 7/15/16 revealed a 1.4 x 0.7 x 2.4 cm T2 hyperintense lesion with  mild expansion of the cord seen at the C2-C3 level. The lesion previously measured 0.4 x 0.7 x 0.6 mm.    Ms. Godfrey was referred to Radiation Oncology, Dr. Petey Swanson. She completed a course of 54 Gy in 30 fractions from C1 - C4 vertebral  bodies on 10/17/16. She will be have follow-up in 6 months with probable repeat of MRI postnatally.    NOTE: Radiation therapy was completed prior to conception.    PObHx:    2014 - 40-5/7 week C/S fo r9# 8.6oz baby.  2015 - ectopic with salpingectomy    PSH:    Posterior cervical laminectomy, C/S, Appendectomy, salpingectomy.    Family history:    Negative for genetic abnormalities or congenital defects    Social history:    Prior smoker who quit in 2012. Denies AODA.    A detailed fetal anatomical ultrasound was completed today.      Age based risk for Down Syndrome at this gestational age is approximately 1 in 350.        Discussion:    1. Spinal lesion. Should not interfere in any way with pregnancy. She will continue to be followed by Dr. Swanson.    2. AMA - Ms. Godfrey has declined genetic screening. Ultrasound shows no abnormalities.    Recommendations:    1. For women who are of advanced maternal age at the time of delivery we recommend a follow up fetal ultrasound at 32-34 weeks for interval  fetal growth and well being (biophysical profile).  2. Return in 3 - 4 weeks for growth and completion of anatomy.  3. Further testing as clinically indicated.        Impression  =========    Incomplete fetal anatomy with no obvious  abnormalities. Some cardiac views were not well seen.    Biometry is consistent with dating.  Normal amniotic fluid volume per qualitative assessment.    Normal placental location without evidence of previa.  Normal appearing cervical length per trans-abdominal screening.    I spent 25 minutes in direct consultation and care management with greater than 50% in face to face discussion..        Recommendation  ==============    See consult.    Thank you again for allowing us to participate in the care of your patients. If you have any questions concerning today's consultation, feel free  to contact me or one of my partners. We can be reached at [phone] during normal business hours. If you have a question after normal  business hours, please contact Labor and Delivery at [phone].

## 2017-06-14 ENCOUNTER — ROUTINE PRENATAL (OUTPATIENT)
Dept: OBSTETRICS AND GYNECOLOGY | Facility: CLINIC | Age: 37
End: 2017-06-14
Attending: OBSTETRICS & GYNECOLOGY
Payer: MEDICAID

## 2017-06-14 VITALS — SYSTOLIC BLOOD PRESSURE: 108 MMHG | DIASTOLIC BLOOD PRESSURE: 62 MMHG | WEIGHT: 235.44 LBS | BODY MASS INDEX: 38 KG/M2

## 2017-06-14 DIAGNOSIS — O09.92 PREGNANCY, SUPERVISION, HIGH-RISK, SECOND TRIMESTER: Primary | ICD-10-CM

## 2017-06-14 DIAGNOSIS — O34.219 HISTORY OF CESAREAN SECTION COMPLICATING PREGNANCY: ICD-10-CM

## 2017-06-14 DIAGNOSIS — D33.4 BENIGN TUMOR OF SPINAL CORD: ICD-10-CM

## 2017-06-14 DIAGNOSIS — O99.212 OBESITY COMPLICATING PREGNANCY, SECOND TRIMESTER: ICD-10-CM

## 2017-06-14 PROBLEM — O09.522 ELDERLY MULTIGRAVIDA IN SECOND TRIMESTER: Status: RESOLVED | Noted: 2017-05-30 | Resolved: 2017-06-14

## 2017-06-14 PROCEDURE — 99999 PR PBB SHADOW E&M-EST. PATIENT-LVL III: CPT | Mod: PBBFAC,,, | Performed by: OBSTETRICS & GYNECOLOGY

## 2017-06-14 PROCEDURE — 0502F SUBSEQUENT PRENATAL CARE: CPT | Mod: S$GLB,,, | Performed by: OBSTETRICS & GYNECOLOGY

## 2017-06-14 NOTE — PROGRESS NOTES
Good fm.  Denies ctx, vb, lof.  Glucola and cbc on rtc..  She will make an appointment with neurology, neurosurgery.

## 2017-06-27 ENCOUNTER — OFFICE VISIT (OUTPATIENT)
Dept: MATERNAL FETAL MEDICINE | Facility: CLINIC | Age: 37
End: 2017-06-27
Payer: MEDICAID

## 2017-06-27 DIAGNOSIS — Z36.89 ENCOUNTER FOR ULTRASOUND TO CHECK FETAL GROWTH: ICD-10-CM

## 2017-06-27 PROCEDURE — 76816 OB US FOLLOW-UP PER FETUS: CPT | Mod: PBBFAC | Performed by: OBSTETRICS & GYNECOLOGY

## 2017-06-27 PROCEDURE — 76816 OB US FOLLOW-UP PER FETUS: CPT | Mod: 26,S$PBB,, | Performed by: OBSTETRICS & GYNECOLOGY

## 2017-06-27 PROCEDURE — 99499 UNLISTED E&M SERVICE: CPT | Mod: S$PBB,,, | Performed by: OBSTETRICS & GYNECOLOGY

## 2017-06-27 NOTE — PROGRESS NOTES
OB Ultrasound Report (see PDF version under imaging tab)    Indication  ========    Follow-up evaluation of anatomy and growth.    History  ======    Risk Factors  History risk factors: AMA  Details: Benign tumor of spinal cord  History risk factors: Obesity    Pregnancy History  ===============    Maternal Lab Tests  Result: declined screening  Wants to know gender: yes    Method  ======    Transabdominal ultrasound examination. View: Sufficient.    Pregnancy  =========    Padron pregnancy. Number of fetuses: 1.    Dating  ======    Cycle: regular cycle  Ultrasound examination on: 6/27/2017  GA by U/S based upon: AC, BPD, Femur, HC  GA by U/S 25 w + 3 d  ABIEL by U/S: 10/7/2017  Assigned: Dating performed on 03/22/2017, based on the LMP  Assigned GA 24 w + 0 d  Assigned ABIEL: 10/17/2017    General Evaluation  ===============    Cardiac activity: present.  bpm.  Fetal movements: visualized.  Presentation: Variable.  Placenta:  Placental site: posterior, left, anterior.  Umbilical cord: Cord vessels: 3 vessel cord.  Amniotic fluid: Amount of AF: normal amount. MVP 5.8 cm.    Fetal Biometry  ===========    Fetal Biometry  BPD 62.6 mm 25w 3d Hadlock  OFD 81.0 mm 26w 2d Adrianne  .3 mm 25w 1d Hadlock  .2 mm 26w 4d Hadlock  Femur 44.9 mm 24w 6d Hadlock  CM 4.3 mm   g 77% Peter  Calculated by: Hadlock (BPD-HC-AC-FL)  EFW (lb) 1 lb  EFW (oz) 14 oz  Cephalic index 0.77  HC / AC 1.05  FL / BPD 0.72  FL / AC 0.20  MVP 5.8 cm   bpm  Head / Face / Neck   5.4 mm    Fetal Anatomy  ===========    Cranium: normal  Lateral ventricles: normal  Choroid plexus: normal  Midline falx: normal  Cavum septi pellucidi: normal  Cerebellum: normal  Cisterna magna: normal  4-chamber view: 4-chamber normal, septum normal  Aortic arch: normal  SVC: normal  IVC: normal  3-vessel view: normal  3-vessel-trachea  view: normal  Stomach: normal  Kidneys: normal  Bladder: normal  Genitals: normal  Gender: female  Wants to know gender: yes    Maternal Structures  ===============    Uterus / Cervix  Fibroids: Fibroids identified  Findings: Subserous. Anterior  D1 22.0 mm  D2 23.0 mm  Mean 22.5 mm    Impression  =========    A follow up fetal anatomical ultrasound was completed today.  The fetal anatomic survey was completed today, and no fetal structural abnormalities were noted. Interval fetal growth has been  normal, and the AFV is normal. A small uterine myoma is seen.  F/U as clinically indicated.

## 2017-07-10 ENCOUNTER — ROUTINE PRENATAL (OUTPATIENT)
Dept: OBSTETRICS AND GYNECOLOGY | Facility: CLINIC | Age: 37
End: 2017-07-10
Attending: OBSTETRICS & GYNECOLOGY
Payer: MEDICAID

## 2017-07-10 VITALS
BODY MASS INDEX: 39.64 KG/M2 | WEIGHT: 245.56 LBS | DIASTOLIC BLOOD PRESSURE: 68 MMHG | SYSTOLIC BLOOD PRESSURE: 102 MMHG

## 2017-07-10 DIAGNOSIS — O34.219 HISTORY OF CESAREAN SECTION COMPLICATING PREGNANCY: ICD-10-CM

## 2017-07-10 DIAGNOSIS — O99.212 OBESITY COMPLICATING PREGNANCY, SECOND TRIMESTER: ICD-10-CM

## 2017-07-10 DIAGNOSIS — O09.92 PREGNANCY, SUPERVISION, HIGH-RISK, SECOND TRIMESTER: Primary | ICD-10-CM

## 2017-07-10 PROCEDURE — 0502F SUBSEQUENT PRENATAL CARE: CPT | Mod: S$GLB,,, | Performed by: OBSTETRICS & GYNECOLOGY

## 2017-07-10 PROCEDURE — 99212 OFFICE O/P EST SF 10 MIN: CPT | Mod: PBBFAC | Performed by: OBSTETRICS & GYNECOLOGY

## 2017-07-10 PROCEDURE — 99999 PR PBB SHADOW E&M-EST. PATIENT-LVL II: CPT | Mod: PBBFAC,,, | Performed by: OBSTETRICS & GYNECOLOGY

## 2017-07-10 NOTE — PROGRESS NOTES
Good fm.  Denies ctx, vb, lof.  She will schedule with neurology and neurosurgery.  follow up labs

## 2017-07-11 ENCOUNTER — TELEPHONE (OUTPATIENT)
Dept: OBSTETRICS AND GYNECOLOGY | Facility: CLINIC | Age: 37
End: 2017-07-11

## 2017-07-12 ENCOUNTER — PATIENT MESSAGE (OUTPATIENT)
Dept: OBSTETRICS AND GYNECOLOGY | Facility: CLINIC | Age: 37
End: 2017-07-12

## 2017-07-12 ENCOUNTER — LAB VISIT (OUTPATIENT)
Dept: LAB | Facility: OTHER | Age: 37
End: 2017-07-12
Attending: OBSTETRICS & GYNECOLOGY
Payer: MEDICAID

## 2017-07-12 DIAGNOSIS — O99.810 ABNORMAL GLUCOSE AFFECTING PREGNANCY: Primary | ICD-10-CM

## 2017-07-12 DIAGNOSIS — O09.92 PREGNANCY, SUPERVISION, HIGH-RISK, SECOND TRIMESTER: ICD-10-CM

## 2017-07-12 LAB
BASOPHILS # BLD AUTO: 0.02 K/UL
BASOPHILS NFR BLD: 0.2 %
DIFFERENTIAL METHOD: ABNORMAL
EOSINOPHIL # BLD AUTO: 0.2 K/UL
EOSINOPHIL NFR BLD: 2.1 %
ERYTHROCYTE [DISTWIDTH] IN BLOOD BY AUTOMATED COUNT: 14.1 %
GLUCOSE SERPL-MCNC: 160 MG/DL
HCT VFR BLD AUTO: 33.2 %
HGB BLD-MCNC: 11 G/DL
LYMPHOCYTES # BLD AUTO: 1.1 K/UL
LYMPHOCYTES NFR BLD: 12.2 %
MCH RBC QN AUTO: 30.1 PG
MCHC RBC AUTO-ENTMCNC: 33.1 %
MCV RBC AUTO: 91 FL
MONOCYTES # BLD AUTO: 0.5 K/UL
MONOCYTES NFR BLD: 5.2 %
NEUTROPHILS # BLD AUTO: 7.2 K/UL
NEUTROPHILS NFR BLD: 79.2 %
PLATELET # BLD AUTO: 122 K/UL
PMV BLD AUTO: 13.2 FL
RBC # BLD AUTO: 3.66 M/UL
WBC # BLD AUTO: 9.03 K/UL

## 2017-07-12 PROCEDURE — 36415 COLL VENOUS BLD VENIPUNCTURE: CPT

## 2017-07-12 PROCEDURE — 82950 GLUCOSE TEST: CPT

## 2017-07-12 PROCEDURE — 85025 COMPLETE CBC W/AUTO DIFF WBC: CPT

## 2017-07-14 ENCOUNTER — TELEPHONE (OUTPATIENT)
Dept: OBSTETRICS AND GYNECOLOGY | Facility: CLINIC | Age: 37
End: 2017-07-14

## 2017-07-19 ENCOUNTER — LAB VISIT (OUTPATIENT)
Dept: LAB | Facility: OTHER | Age: 37
End: 2017-07-19
Attending: OBSTETRICS & GYNECOLOGY
Payer: MEDICAID

## 2017-07-19 DIAGNOSIS — O99.810 ABNORMAL GLUCOSE AFFECTING PREGNANCY: ICD-10-CM

## 2017-07-19 LAB
GLUCOSE SERPL-MCNC: 117 MG/DL
GLUCOSE SERPL-MCNC: 192 MG/DL
GLUCOSE SERPL-MCNC: 62 MG/DL
GLUCOSE SERPL-MCNC: 94 MG/DL

## 2017-07-19 PROCEDURE — 82951 GLUCOSE TOLERANCE TEST (GTT): CPT

## 2017-07-19 PROCEDURE — 36415 COLL VENOUS BLD VENIPUNCTURE: CPT

## 2017-07-25 ENCOUNTER — PATIENT MESSAGE (OUTPATIENT)
Dept: RADIATION ONCOLOGY | Facility: CLINIC | Age: 37
End: 2017-07-25

## 2017-08-14 ENCOUNTER — ROUTINE PRENATAL (OUTPATIENT)
Dept: OBSTETRICS AND GYNECOLOGY | Facility: CLINIC | Age: 37
End: 2017-08-14
Attending: OBSTETRICS & GYNECOLOGY
Payer: MEDICAID

## 2017-08-14 VITALS
SYSTOLIC BLOOD PRESSURE: 112 MMHG | BODY MASS INDEX: 39.64 KG/M2 | WEIGHT: 245.56 LBS | DIASTOLIC BLOOD PRESSURE: 70 MMHG

## 2017-08-14 DIAGNOSIS — O99.113 BENIGN GESTATIONAL THROMBOCYTOPENIA IN THIRD TRIMESTER: ICD-10-CM

## 2017-08-14 DIAGNOSIS — O34.219 HISTORY OF CESAREAN SECTION COMPLICATING PREGNANCY: ICD-10-CM

## 2017-08-14 DIAGNOSIS — D69.6 BENIGN GESTATIONAL THROMBOCYTOPENIA IN THIRD TRIMESTER: ICD-10-CM

## 2017-08-14 DIAGNOSIS — O99.213 OBESITY COMPLICATING PREGNANCY, THIRD TRIMESTER: ICD-10-CM

## 2017-08-14 DIAGNOSIS — O09.93 PREGNANCY, SUPERVISION, HIGH-RISK, THIRD TRIMESTER: Primary | ICD-10-CM

## 2017-08-14 PROCEDURE — 99212 OFFICE O/P EST SF 10 MIN: CPT | Mod: PBBFAC | Performed by: OBSTETRICS & GYNECOLOGY

## 2017-08-14 PROCEDURE — 3008F BODY MASS INDEX DOCD: CPT | Mod: ,,, | Performed by: OBSTETRICS & GYNECOLOGY

## 2017-08-14 PROCEDURE — 99999 PR PBB SHADOW E&M-EST. PATIENT-LVL II: CPT | Mod: PBBFAC,,, | Performed by: OBSTETRICS & GYNECOLOGY

## 2017-08-14 PROCEDURE — 99213 OFFICE O/P EST LOW 20 MIN: CPT | Mod: TH,S$PBB,, | Performed by: OBSTETRICS & GYNECOLOGY

## 2017-08-14 NOTE — PROGRESS NOTES
Complaints today:none  Good fm.  Denies ctx, vb, lof.  Has follow up appts with mfm, rad onc, and neurosurgery    /70   Wt 111.4 kg (245 lb 9.5 oz)   LMP 01/10/2017 (Exact Date)   BMI 39.64 kg/m²     37 y.o., at 30w6d by Estimated Date of Delivery: 10/17/17  Patient Active Problem List   Diagnosis    Back pain, lumbosacral    Reflux    Benign tumor of spinal cord    Undergoing workup for MS    Pregnancy, supervision, high-risk/breast/nfp    Obesity complicating pregnancy    History of  section complicating pregnancy     OB History    Para Term  AB Living   3 1 1 0 1 1   SAB TAB Ectopic Multiple Live Births   0 0 1 0 1      # Outcome Date GA Lbr King/2nd Weight Sex Delivery Anes PTL Lv   3 Current            2 Ectopic 2015     ECTOPIC      1 Term 14 40w5d  4.326 kg (9 lb 8.6 oz) F CS-LTranv EPI N GAUDENCIO          Dating reviewed    Allergies and problem list reviewed and updated    Medical and surgical history reviewed    Prenatal labs reviewed and updated    Physical Exam:  ABD: soft, gravid, nontender,     Assessment:  Ann was seen today for routine prenatal visit.    Diagnoses and all orders for this visit:    Pregnancy, supervision, high-risk, third trimester  -     CBC auto differential; Future    Obesity complicating pregnancy, third trimester    History of  section complicating pregnancy         Plan:   follow up cbc   follow up 2Weeks, kick counts, labor precautions

## 2017-08-16 ENCOUNTER — LAB VISIT (OUTPATIENT)
Dept: LAB | Facility: OTHER | Age: 37
End: 2017-08-16
Attending: OBSTETRICS & GYNECOLOGY
Payer: MEDICAID

## 2017-08-16 DIAGNOSIS — O09.93 PREGNANCY, SUPERVISION, HIGH-RISK, THIRD TRIMESTER: ICD-10-CM

## 2017-08-16 LAB
BASOPHILS # BLD AUTO: 0.02 K/UL
BASOPHILS NFR BLD: 0.2 %
DIFFERENTIAL METHOD: ABNORMAL
EOSINOPHIL # BLD AUTO: 0.2 K/UL
EOSINOPHIL NFR BLD: 1.9 %
ERYTHROCYTE [DISTWIDTH] IN BLOOD BY AUTOMATED COUNT: 13.9 %
HCT VFR BLD AUTO: 34.7 %
HGB BLD-MCNC: 11.5 G/DL
LYMPHOCYTES # BLD AUTO: 1.4 K/UL
LYMPHOCYTES NFR BLD: 14.3 %
MCH RBC QN AUTO: 30 PG
MCHC RBC AUTO-ENTMCNC: 33.1 G/DL
MCV RBC AUTO: 91 FL
MONOCYTES # BLD AUTO: 0.6 K/UL
MONOCYTES NFR BLD: 6.3 %
NEUTROPHILS # BLD AUTO: 7.4 K/UL
NEUTROPHILS NFR BLD: 76.7 %
PLATELET # BLD AUTO: 135 K/UL
PMV BLD AUTO: 13.1 FL
RBC # BLD AUTO: 3.83 M/UL
WBC # BLD AUTO: 9.67 K/UL

## 2017-08-16 PROCEDURE — 36415 COLL VENOUS BLD VENIPUNCTURE: CPT

## 2017-08-16 PROCEDURE — 85025 COMPLETE CBC W/AUTO DIFF WBC: CPT

## 2017-08-22 ENCOUNTER — OFFICE VISIT (OUTPATIENT)
Dept: MATERNAL FETAL MEDICINE | Facility: CLINIC | Age: 37
End: 2017-08-22
Attending: OBSTETRICS & GYNECOLOGY
Payer: MEDICAID

## 2017-08-22 ENCOUNTER — OFFICE VISIT (OUTPATIENT)
Dept: NEUROSURGERY | Facility: CLINIC | Age: 37
End: 2017-08-22
Payer: MEDICAID

## 2017-08-22 VITALS
HEART RATE: 86 BPM | SYSTOLIC BLOOD PRESSURE: 106 MMHG | BODY MASS INDEX: 39.53 KG/M2 | WEIGHT: 246 LBS | TEMPERATURE: 97 F | RESPIRATION RATE: 16 BRPM | DIASTOLIC BLOOD PRESSURE: 74 MMHG | HEIGHT: 66 IN

## 2017-08-22 DIAGNOSIS — Z36.89 ENCOUNTER FOR ULTRASOUND TO CHECK FETAL GROWTH: ICD-10-CM

## 2017-08-22 DIAGNOSIS — D33.4 BENIGN TUMOR OF SPINAL CORD: Primary | ICD-10-CM

## 2017-08-22 DIAGNOSIS — O09.523 ELDERLY MULTIGRAVIDA, THIRD TRIMESTER: ICD-10-CM

## 2017-08-22 PROCEDURE — 76816 OB US FOLLOW-UP PER FETUS: CPT | Mod: 26,S$PBB,, | Performed by: OBSTETRICS & GYNECOLOGY

## 2017-08-22 PROCEDURE — 3008F BODY MASS INDEX DOCD: CPT | Mod: ,,, | Performed by: NEUROLOGICAL SURGERY

## 2017-08-22 PROCEDURE — 99999 PR PBB SHADOW E&M-EST. PATIENT-LVL IV: CPT | Mod: PBBFAC,,, | Performed by: NEUROLOGICAL SURGERY

## 2017-08-22 PROCEDURE — 99213 OFFICE O/P EST LOW 20 MIN: CPT | Mod: S$PBB,,, | Performed by: NEUROLOGICAL SURGERY

## 2017-08-22 PROCEDURE — 99499 UNLISTED E&M SERVICE: CPT | Mod: S$PBB,,, | Performed by: OBSTETRICS & GYNECOLOGY

## 2017-08-22 PROCEDURE — 99214 OFFICE O/P EST MOD 30 MIN: CPT | Mod: PBBFAC,25 | Performed by: NEUROLOGICAL SURGERY

## 2017-08-22 NOTE — PROGRESS NOTES
Subjective:    I, Yoana Norman, attest that this documentation has been prepared under the direction and in the presence of Jose Francisco Davis MD.     Patient ID: Ann Godfrey is a 37 y.o. female.    Chief Complaint: No chief complaint on file.    HPI Patient is a 37 year old female with a glioma of the cervical spine who presents today to discuss a plan in case she needs anesthesia or an epidural for her birth plan. The patient is due to deliver mid-October.      Patient reports that she has been experiencing pain in her scalp on the right side of her head. To alleviate the pain she uses a warm compress. Patient reports that her back is stiff and has been since she has been pregnant. Patient reports weakness In her right hand and loss of sensation on her left lower extremity.     Review of Systems   Constitutional: Negative for activity change, appetite change, fatigue, fever and unexpected weight change.   HENT: Negative for facial swelling.    Eyes: Negative.    Respiratory: Negative.    Cardiovascular: Negative.    Gastrointestinal: Negative for diarrhea, nausea and vomiting.   Endocrine: Negative.    Genitourinary: Negative.    Musculoskeletal: Positive for myalgias (pain on right side of scalp). Negative for back pain, joint swelling and neck pain.   Neurological: Negative for dizziness, weakness, numbness and headaches.   Psychiatric/Behavioral: Negative.        Past Medical History:   Diagnosis Date    Arthritis     spinal    Asthma     was on advair 2010, resolved    Benign tumor of spinal cord     Depression     GERD (gastroesophageal reflux disease)        Objective:     LMP 01/10/2017 (Exact Date)     Physical Exam   Constitutional: She is oriented to person, place, and time. She appears well-developed and well-nourished.   HENT:   Head: Normocephalic and atraumatic.   Neck: Neck supple.   Neurological: She is alert and oriented to person, place, and time. No cranial nerve deficit. She displays  a negative Romberg sign. GCS eye subscore is 4. GCS verbal subscore is 5. GCS motor subscore is 6.     I, Dr. Jose Francisco Davis, personally performed the services described in this documentation. All medical record entries made by the scribe, Yoana Norman, were at my direction and in my presence.  I have reviewed the chart and agree that the record reflects my personal performance and is accurate and complete.   Assessment:       Cervical glioma.  Plan:   Patient is clear to receive and epidural or anesthesia from a neurosurgical standpoint. Patient's OBGYN can contact me should there be any further questions or concerns. I will schedule a follow up appointment in 1 year with an MRI c spine w/wo contrast.

## 2017-08-22 NOTE — LETTER
August 22, 2017      Yamileth Weber MD  [address]  [address]  [facility] [address]           Spiritism - Maternal Fetal Med  [address]  [facility] [address]  Phone: [phone]          Patient: Ann Godfrey   MR Number: [id number]   YOB: 1980   Date of Visit: 8/22/2017       Dear Dr. Yamileth Weber:    Thank you for referring Ann Godfrey to me for evaluation. Attached you will find relevant portions of my assessment and plan of care.    If you have questions, please do not hesitate to call me. I look forward to following Ann Godfrey along with you.    Sincerely,    Lanie Hsieh MD    Enclosure  CC:  No Recipients    If you would like to receive this communication electronically, please contact [email or web address] or [phone] to request more information on [facility] Link access.    For providers and/or their staff who would like to refer a patient to [facility], please contact us through our one-stop-shop provider referral line, [facility], at [phone].    If you feel you have received this communication in error or would no longer like to receive these types of communications, please e-mail [email or web address]

## 2017-08-22 NOTE — PROGRESS NOTES
OB Ultrasound Report (see PDF version under imaging tab)    Indication  ========    Evaluation of fetal growth.    History  ======    Risk Factors  History risk factors: AMA  Details: Benign tumor of spinal cord  History risk factors: Obesity    Pregnancy History  ===============    Maternal Lab Tests  Result: declined screening  Wants to know gender: yes    Method  ======    Transabdominal ultrasound examination. View: Suboptimal view: limited by late gestational age.    Pregnancy  =========    Padron pregnancy. Number of fetuses: 1.    Dating  ======    Cycle: regular cycle  Ultrasound examination on: 8/22/2017  GA by U/S based upon: AC, BPD, Femur, HC  GA by U/S 34 w + 4 d  ABIEL by U/S: 9/29/2017  Assigned: Dating performed on 03/22/2017, based on the LMP  Assigned GA 32 w + 0 d  Assigned ABIEL: 10/17/2017    General Evaluation  ===============    Cardiac activity: present.  bpm.  Fetal movements: visualized.  Presentation: cephalic.  Placenta: posterior, right.  Umbilical cord: 3 vessel cord.  Amniotic fluid: MVP 4.5 cm.    Fetal Biometry  ===========    Fetal Biometry  BPD 88.4 mm 35w 5d Hadlock  .7 mm 34w 6d Adrianne  .6 mm 34w 6d Hadlock  .8 mm 33w 6d Hadlock  Femur 65.7 mm 33w 6d Hadlock  EFW 2,358 g 58% Peter  Calculated by: Hadlock (BPD-HC-AC-FL)  EFW (lb) 5 lb  EFW (oz) 3 oz  Cephalic index 0.84  HC / AC 1.04  FL / BPD 0.74  FL / AC 0.22  MVP 4.5 cm   bpm    Fetal Anatomy  ===========    Cranium: normal  4-chamber view: normal  Stomach: normal  Kidneys: normal  Bladder: normal  Arms: documented previously  Legs: documented previously  Gender: female  Wants to know gender: yes  Other: A full anatomy survey previously performed.    Impression  =========    Fetal size is AGA with the EFW at the 58th percentile.  Normal repeat limited fetal anatomic survey. AFV is normal. Follow-up ultrasound as clinically indicated.

## 2017-08-22 NOTE — PATIENT INSTRUCTIONS
Patient is clear to receive and epidural or anesthesia from a neurosurgical standpoint. Patient's OBGYN can contact me should there be any further questions or concerns. I will schedule a follow up appointment in 1 year with an MRI c spine w/wo contrast.

## 2017-08-29 ENCOUNTER — ROUTINE PRENATAL (OUTPATIENT)
Dept: OBSTETRICS AND GYNECOLOGY | Facility: CLINIC | Age: 37
End: 2017-08-29
Payer: MEDICAID

## 2017-08-29 VITALS — BODY MASS INDEX: 40.67 KG/M2 | WEIGHT: 252 LBS | DIASTOLIC BLOOD PRESSURE: 68 MMHG | SYSTOLIC BLOOD PRESSURE: 112 MMHG

## 2017-08-29 DIAGNOSIS — O99.113 BENIGN GESTATIONAL THROMBOCYTOPENIA IN THIRD TRIMESTER: ICD-10-CM

## 2017-08-29 DIAGNOSIS — D69.6 BENIGN GESTATIONAL THROMBOCYTOPENIA IN THIRD TRIMESTER: ICD-10-CM

## 2017-08-29 DIAGNOSIS — O09.93 PREGNANCY, SUPERVISION, HIGH-RISK, THIRD TRIMESTER: Primary | ICD-10-CM

## 2017-08-29 DIAGNOSIS — O99.213 OBESITY COMPLICATING PREGNANCY, THIRD TRIMESTER: ICD-10-CM

## 2017-08-29 PROCEDURE — 99213 OFFICE O/P EST LOW 20 MIN: CPT | Mod: TH,S$PBB,, | Performed by: OBSTETRICS & GYNECOLOGY

## 2017-08-29 PROCEDURE — 3008F BODY MASS INDEX DOCD: CPT | Mod: ,,, | Performed by: OBSTETRICS & GYNECOLOGY

## 2017-08-29 PROCEDURE — 99999 PR PBB SHADOW E&M-EST. PATIENT-LVL II: CPT | Mod: PBBFAC,,, | Performed by: OBSTETRICS & GYNECOLOGY

## 2017-08-29 PROCEDURE — 99212 OFFICE O/P EST SF 10 MIN: CPT | Mod: PBBFAC,PO | Performed by: OBSTETRICS & GYNECOLOGY

## 2017-08-29 NOTE — PROGRESS NOTES
Complaints today: occasional BH ctxns, 1-2/day, - vb, -lof, +fm, feeling well    /68   Wt 114.3 kg (251 lb 15.8 oz)   LMP 01/10/2017 (Exact Date)   BMI 40.67 kg/m²     37 y.o., at 33w0d by Estimated Date of Delivery: 10/17/17  Patient Active Problem List   Diagnosis    Back pain, lumbosacral    Reflux    Benign tumor of spinal cord    Undergoing workup for MS    Pregnancy, supervision, high-risk/breast/nfp    Obesity complicating pregnancy    History of  section complicating pregnancy    Benign gestational thrombocytopenia in third trimester     OB History    Para Term  AB Living   3 1 1 0 1 1   SAB TAB Ectopic Multiple Live Births   0 0 1 0 1      # Outcome Date GA Lbr King/2nd Weight Sex Delivery Anes PTL Lv   3 Current            2 Ectopic 2015     ECTOPIC      1 Term 14 40w5d  4.326 kg (9 lb 8.6 oz) F CS-LTranv EPI N GAUDENCIO          Dating reviewed    Allergies and problem list reviewed and updated    Medical and surgical history reviewed    Prenatal labs reviewed and updated    Physical Exam:  ABD: soft, gravid, nontender    Assessment:  38 yo  at 33 and 0    Plan:   --TDAP  --f/u in 2 weeks  --kick counts, labor precaution    Hi Hamilton MD  PGY1, OBGYN [facility]

## 2017-08-29 NOTE — PROGRESS NOTES
Seen and examined.  Agree with note.  All questions answered  Neurosurgery ok's for spinal/epidudul

## 2017-09-01 ENCOUNTER — PATIENT MESSAGE (OUTPATIENT)
Dept: OBSTETRICS AND GYNECOLOGY | Facility: CLINIC | Age: 37
End: 2017-09-01

## 2017-09-05 ENCOUNTER — PATIENT MESSAGE (OUTPATIENT)
Dept: OBSTETRICS AND GYNECOLOGY | Facility: CLINIC | Age: 37
End: 2017-09-05

## 2017-09-05 ENCOUNTER — TELEPHONE (OUTPATIENT)
Dept: OBSTETRICS AND GYNECOLOGY | Facility: CLINIC | Age: 37
End: 2017-09-05

## 2017-09-05 NOTE — TELEPHONE ENCOUNTER
----- Message from Alirio Mcqueen sent at 9/1/2017 12:06 PM CDT -----  Contact: Pt  X_ 1st Request  _ 2nd Request  _ 3rd Request    Who: DYLLAN RIVERA [[id number]]    Why: Patient would like to speak with staff in regards to a routine ob appointment in two weeks    What Number to Call Back: [phone]    When to Expect a call back: (Before the end of the day)  -- if call after 3:00 call back will be tomorrow.

## 2017-09-11 ENCOUNTER — ROUTINE PRENATAL (OUTPATIENT)
Dept: OBSTETRICS AND GYNECOLOGY | Facility: CLINIC | Age: 37
End: 2017-09-11
Attending: OBSTETRICS & GYNECOLOGY
Payer: MEDICAID

## 2017-09-11 VITALS
DIASTOLIC BLOOD PRESSURE: 70 MMHG | SYSTOLIC BLOOD PRESSURE: 112 MMHG | BODY MASS INDEX: 42.02 KG/M2 | WEIGHT: 260.38 LBS

## 2017-09-11 DIAGNOSIS — O34.219 HISTORY OF CESAREAN SECTION COMPLICATING PREGNANCY: ICD-10-CM

## 2017-09-11 DIAGNOSIS — O99.113 BENIGN GESTATIONAL THROMBOCYTOPENIA IN THIRD TRIMESTER: ICD-10-CM

## 2017-09-11 DIAGNOSIS — O99.213 OBESITY COMPLICATING PREGNANCY, THIRD TRIMESTER: ICD-10-CM

## 2017-09-11 DIAGNOSIS — D69.6 BENIGN GESTATIONAL THROMBOCYTOPENIA IN THIRD TRIMESTER: ICD-10-CM

## 2017-09-11 DIAGNOSIS — O26.849 UTERINE SIZE DATE DISCREPANCY, ANTEPARTUM CONDITION OR COMPLICATION: ICD-10-CM

## 2017-09-11 DIAGNOSIS — O09.93 PREGNANCY, SUPERVISION, HIGH-RISK, THIRD TRIMESTER: Primary | ICD-10-CM

## 2017-09-11 PROCEDURE — 99999 PR PBB SHADOW E&M-EST. PATIENT-LVL II: CPT | Mod: PBBFAC,,, | Performed by: OBSTETRICS & GYNECOLOGY

## 2017-09-11 PROCEDURE — 99212 OFFICE O/P EST SF 10 MIN: CPT | Mod: PBBFAC | Performed by: OBSTETRICS & GYNECOLOGY

## 2017-09-11 PROCEDURE — 3008F BODY MASS INDEX DOCD: CPT | Mod: ,,, | Performed by: OBSTETRICS & GYNECOLOGY

## 2017-09-11 PROCEDURE — 99213 OFFICE O/P EST LOW 20 MIN: CPT | Mod: TH,S$PBB,, | Performed by: OBSTETRICS & GYNECOLOGY

## 2017-09-11 NOTE — PROGRESS NOTES
Complaints today: none  Good fm.  Denies ctx, vb, lof     /70   Wt 118.1 kg (260 lb 5.8 oz)   LMP 01/10/2017 (Exact Date)   BMI 42.02 kg/m²     37 y.o., at 34w6d by Estimated Date of Delivery: 10/17/17  Patient Active Problem List   Diagnosis    Back pain, lumbosacral    Reflux    Benign tumor of spinal cord    Undergoing workup for MS    Pregnancy, supervision, high-risk/breast/nfp    Obesity complicating pregnancy    History of  section complicating pregnancy    Benign gestational thrombocytopenia in third trimester     OB History    Para Term  AB Living   3 1 1 0 1 1   SAB TAB Ectopic Multiple Live Births   0 0 1 0 1      # Outcome Date GA Lbr King/2nd Weight Sex Delivery Anes PTL Lv   3 Current            2 Ectopic 2015     ECTOPIC      1 Term 14 40w5d  4.326 kg (9 lb 8.6 oz) F CS-LTranv EPI N GAUDENCIO          Dating reviewed    Allergies and problem list reviewed and updated    Medical and surgical history reviewed    Prenatal labs reviewed and updated    Physical Exam:  ABD: soft, gravid, nontender,     Assessment:  Ann was seen today for routine prenatal visit.    Diagnoses and all orders for this visit:    Pregnancy, supervision, high-risk, third trimester    Obesity complicating pregnancy, third trimester    History of  section complicating pregnancy    Benign gestational thrombocytopenia in third trimester         Plan:   Gbs, cbc, hiv, and rpr on rtc.  Labor precautions.  follow up with mfm and anesthesia   follow up 1 Weeks, kick counts, labor precautions

## 2017-09-14 ENCOUNTER — ROUTINE PRENATAL (OUTPATIENT)
Dept: OBSTETRICS AND GYNECOLOGY | Facility: CLINIC | Age: 37
End: 2017-09-14
Payer: MEDICAID

## 2017-09-14 ENCOUNTER — PATIENT MESSAGE (OUTPATIENT)
Dept: OBSTETRICS AND GYNECOLOGY | Facility: CLINIC | Age: 37
End: 2017-09-14

## 2017-09-14 ENCOUNTER — OFFICE VISIT (OUTPATIENT)
Dept: MATERNAL FETAL MEDICINE | Facility: CLINIC | Age: 37
End: 2017-09-14
Attending: OBSTETRICS & GYNECOLOGY
Payer: MEDICAID

## 2017-09-14 VITALS
BODY MASS INDEX: 41.95 KG/M2 | DIASTOLIC BLOOD PRESSURE: 78 MMHG | WEIGHT: 259.94 LBS | SYSTOLIC BLOOD PRESSURE: 130 MMHG

## 2017-09-14 DIAGNOSIS — R05.8 PRODUCTIVE COUGH: Primary | ICD-10-CM

## 2017-09-14 DIAGNOSIS — O26.849 UTERINE SIZE DATE DISCREPANCY, ANTEPARTUM CONDITION OR COMPLICATION: ICD-10-CM

## 2017-09-14 PROBLEM — O36.63X0 EXCESSIVE FETAL GROWTH AFFECTING MANAGEMENT OF PREGNANCY IN THIRD TRIMESTER: Status: ACTIVE | Noted: 2017-09-14

## 2017-09-14 PROCEDURE — 99213 OFFICE O/P EST LOW 20 MIN: CPT | Mod: PBBFAC,25,PO | Performed by: OBSTETRICS & GYNECOLOGY

## 2017-09-14 PROCEDURE — 3008F BODY MASS INDEX DOCD: CPT | Mod: ,,, | Performed by: OBSTETRICS & GYNECOLOGY

## 2017-09-14 PROCEDURE — 76816 OB US FOLLOW-UP PER FETUS: CPT | Mod: 26,S$PBB,, | Performed by: OBSTETRICS & GYNECOLOGY

## 2017-09-14 PROCEDURE — 99999 PR PBB SHADOW E&M-EST. PATIENT-LVL III: CPT | Mod: PBBFAC,,, | Performed by: OBSTETRICS & GYNECOLOGY

## 2017-09-14 PROCEDURE — 76816 OB US FOLLOW-UP PER FETUS: CPT | Mod: PBBFAC | Performed by: OBSTETRICS & GYNECOLOGY

## 2017-09-14 PROCEDURE — 99213 OFFICE O/P EST LOW 20 MIN: CPT | Mod: TH,S$PBB,, | Performed by: OBSTETRICS & GYNECOLOGY

## 2017-09-14 PROCEDURE — 99499 UNLISTED E&M SERVICE: CPT | Mod: S$PBB,,, | Performed by: OBSTETRICS & GYNECOLOGY

## 2017-09-14 NOTE — PROGRESS NOTES
Complaints today: Pt seen today 2/2 complaints of productive cough since Monday.  Reports that family has been sick since Monday but they have improved, while she has not.  Pt with hx of pneumonia x2.  Last in 2016.  No fever/chills.  Notes some chest pain this AM.  Has been coughing- productive cough with mucus.  Denies SOB.  Denies regular CTX, VB, VD, LOF.  + FM.  Continue PNV.       /78   Wt 117.9 kg (259 lb 14.8 oz)   LMP 01/10/2017 (Exact Date)   BMI 41.95 kg/m²     37 y.o., at 35w2d by Estimated Date of Delivery: 10/17/17  Patient Active Problem List   Diagnosis    Back pain, lumbosacral    Reflux    Benign tumor of spinal cord    Undergoing workup for MS    Pregnancy, supervision, high-risk/breast/nfp    Obesity complicating pregnancy    History of  section complicating pregnancy    Benign gestational thrombocytopenia in third trimester     OB History    Para Term  AB Living   3 1 1 0 1 1   SAB TAB Ectopic Multiple Live Births   0 0 1 0 1      # Outcome Date GA Lbr King/2nd Weight Sex Delivery Anes PTL Lv   3 Current            2 Ectopic      ECTOPIC      1 Term 14 40w5d  4.326 kg (9 lb 8.6 oz) F CS-LTranv EPI N GAUDENCIO          Dating reviewed    Allergies and problem list reviewed and updated    Medical and surgical history reviewed    Prenatal labs reviewed and updated    Physical Exam:  ABD: soft, gravid, nontender  Lungs:  Mild decreased breath sounds with minor crackles in L lower lung base.  Otherwise CTA bilaterally      Assessment:  Concern for URI.    Plan:     Cough  - afebrile today: 98.0  - Given hx of pneumonia x2 and symptoms, will send for CXR to confirm no lung process  - Will f/u results    Routine prenatal care  -  follow up 1 Weeks, kasandrack counts     Mercy Bowie MD  PGY-4 OB/GYN  [phone]

## 2017-09-14 NOTE — PROGRESS NOTES
Indication  ========    Evaluation of fetal growth S>D.    History  ======    Risk Factors  History risk factors: AMA  Details: Benign tumor of spinal cord  History risk factors: Obesity    Pregnancy History  ==============    Maternal Lab Tests  Result: declined screening  Wants to know gender: yes    Method  ======    Transabdominal ultrasound examination. View: Suboptimal view: limited by maternal body habitus. Suboptimal view: limited by late gestational  age.    Pregnancy  =========    Padron pregnancy. Number of fetuses: 1.    Dating  ======    Cycle: regular cycle  Ultrasound examination on: 9/14/2017  GA by U/S based upon: BPD, Femur, HC  GA by U/S 38 w + 2 d  ABIEL by U/S: 9/26/2017  Assigned: Dating performed on 03/22/2017, based on the LMP  Assigned GA 35 w + 2 d  Assigned ABIEL: 10/17/2017    General Evaluation  ==============    Cardiac activity: present.  bpm.  Fetal movements: visualized.  Presentation: cephalic.  Placenta:  Placental site: posterior.  Umbilical cord: Cord vessels: 3 vessel cord.  Amniotic fluid: Amount of AF: normal amount. MVP 5.7 cm.    Fetal Biometry  ============    Fetal Biometry  BPD 95.5 mm 39w 0d Hadlock  .4 mm  .7 mm 40w 0d Hadlock  .2 mm  Femur 69.8 mm 35w 6d Hadlock  EFW 4,023 g 98% Peter  Calculated by: Hadlock (BPD-HC-AC-FL)  EFW (lb) 8 lb  EFW (oz) 14 oz  Cephalic index 0.81  HC / AC 0.91  FL / BPD 0.73  FL / AC 0.18  MVP 5.7 cm   bpm    Fetal Anatomy  ===========    Cranium: normal  Stomach: normal  Kidneys: normal  Bladder: normal  Wants to know gender: yes    Impression  =========    The interval fetal growth has been more than expected and the EFW plots at the 98th percentile for gestational age, consistent with an LGA  growth pattern.  The amniotic fluid volume is normal.    Recommendation  ==============    Repeat growth ultrasound in 3 weeks.

## 2017-09-15 ENCOUNTER — HOSPITAL ENCOUNTER (OUTPATIENT)
Dept: RADIOLOGY | Facility: OTHER | Age: 37
Discharge: HOME | End: 2017-09-15
Attending: OBSTETRICS & GYNECOLOGY
Payer: MEDICAID

## 2017-09-15 DIAGNOSIS — R05.8 PRODUCTIVE COUGH: ICD-10-CM

## 2017-09-15 PROCEDURE — 71020 XR CHEST PA AND LATERAL: CPT | Mod: 26,,, | Performed by: RADIOLOGY

## 2017-09-15 PROCEDURE — 71020 XR CHEST PA AND LATERAL: CPT | Mod: TC

## 2017-09-18 ENCOUNTER — LAB VISIT (OUTPATIENT)
Dept: LAB | Facility: OTHER | Age: 37
End: 2017-09-18
Attending: OBSTETRICS & GYNECOLOGY
Payer: MEDICAID

## 2017-09-18 ENCOUNTER — ROUTINE PRENATAL (OUTPATIENT)
Dept: OBSTETRICS AND GYNECOLOGY | Facility: CLINIC | Age: 37
End: 2017-09-18
Attending: OBSTETRICS & GYNECOLOGY
Payer: MEDICAID

## 2017-09-18 ENCOUNTER — PATIENT MESSAGE (OUTPATIENT)
Dept: OBSTETRICS AND GYNECOLOGY | Facility: CLINIC | Age: 37
End: 2017-09-18

## 2017-09-18 VITALS
BODY MASS INDEX: 42.42 KG/M2 | WEIGHT: 262.81 LBS | DIASTOLIC BLOOD PRESSURE: 80 MMHG | SYSTOLIC BLOOD PRESSURE: 128 MMHG

## 2017-09-18 DIAGNOSIS — O09.93 PREGNANCY, SUPERVISION, HIGH-RISK, THIRD TRIMESTER: ICD-10-CM

## 2017-09-18 DIAGNOSIS — O36.63X1: ICD-10-CM

## 2017-09-18 DIAGNOSIS — O99.213 OBESITY COMPLICATING PREGNANCY, THIRD TRIMESTER: ICD-10-CM

## 2017-09-18 DIAGNOSIS — O09.93 PREGNANCY, SUPERVISION, HIGH-RISK, THIRD TRIMESTER: Primary | ICD-10-CM

## 2017-09-18 DIAGNOSIS — O34.219 HISTORY OF CESAREAN SECTION COMPLICATING PREGNANCY: ICD-10-CM

## 2017-09-18 LAB
BASOPHILS # BLD AUTO: 0.01 K/UL
BASOPHILS NFR BLD: 0.1 %
DIFFERENTIAL METHOD: ABNORMAL
EOSINOPHIL # BLD AUTO: 0.1 K/UL
EOSINOPHIL NFR BLD: 1.3 %
ERYTHROCYTE [DISTWIDTH] IN BLOOD BY AUTOMATED COUNT: 13.9 %
HCT VFR BLD AUTO: 36.1 %
HGB BLD-MCNC: 11.9 G/DL
LYMPHOCYTES # BLD AUTO: 1.1 K/UL
LYMPHOCYTES NFR BLD: 12.5 %
MCH RBC QN AUTO: 30 PG
MCHC RBC AUTO-ENTMCNC: 33 G/DL
MCV RBC AUTO: 91 FL
MONOCYTES # BLD AUTO: 0.5 K/UL
MONOCYTES NFR BLD: 5.3 %
NEUTROPHILS # BLD AUTO: 6.8 K/UL
NEUTROPHILS NFR BLD: 80.2 %
PLATELET # BLD AUTO: 128 K/UL
PMV BLD AUTO: 12.9 FL
RBC # BLD AUTO: 3.97 M/UL
RPR SER QL: NORMAL
WBC # BLD AUTO: 8.42 K/UL

## 2017-09-18 PROCEDURE — 3008F BODY MASS INDEX DOCD: CPT | Mod: ,,, | Performed by: OBSTETRICS & GYNECOLOGY

## 2017-09-18 PROCEDURE — 99213 OFFICE O/P EST LOW 20 MIN: CPT | Mod: PBBFAC | Performed by: OBSTETRICS & GYNECOLOGY

## 2017-09-18 PROCEDURE — 86592 SYPHILIS TEST NON-TREP QUAL: CPT

## 2017-09-18 PROCEDURE — 87081 CULTURE SCREEN ONLY: CPT

## 2017-09-18 PROCEDURE — 99213 OFFICE O/P EST LOW 20 MIN: CPT | Mod: TH,S$PBB,, | Performed by: OBSTETRICS & GYNECOLOGY

## 2017-09-18 PROCEDURE — 36415 COLL VENOUS BLD VENIPUNCTURE: CPT

## 2017-09-18 PROCEDURE — 99999 PR PBB SHADOW E&M-EST. PATIENT-LVL III: CPT | Mod: PBBFAC,,, | Performed by: OBSTETRICS & GYNECOLOGY

## 2017-09-18 PROCEDURE — 86703 HIV-1/HIV-2 1 RESULT ANTBDY: CPT

## 2017-09-18 PROCEDURE — 85025 COMPLETE CBC W/AUTO DIFF WBC: CPT

## 2017-09-18 RX ORDER — AZITHROMYCIN 250 MG/1
500 TABLET, FILM COATED ORAL DAILY
Qty: 6 TABLET | Refills: 0 | Status: SHIPPED | OUTPATIENT
Start: 2017-09-18 | End: 2017-09-23

## 2017-09-18 NOTE — PROGRESS NOTES
Complaints today: pelvic pressure  Good fm.  Denies ctx, vb, lof     /80   Wt 119.2 kg (262 lb 12.6 oz)   LMP 01/10/2017 (Exact Date)   BMI 42.42 kg/m²     37 y.o., at 35w6d by Estimated Date of Delivery: 10/17/17  Patient Active Problem List   Diagnosis    Back pain, lumbosacral    Reflux    Benign tumor of spinal cord    Undergoing workup for MS    Pregnancy, supervision, high-risk/breast/nfp    Obesity complicating pregnancy    History of  section complicating pregnancy    Benign gestational thrombocytopenia in third trimester    Excessive fetal growth affecting management of pregnancy in third trimester     OB History    Para Term  AB Living   3 1 1 0 1 1   SAB TAB Ectopic Multiple Live Births   0 0 1 0 1      # Outcome Date GA Lbr King/2nd Weight Sex Delivery Anes PTL Lv   3 Current            2 Ectopic      ECTOPIC      1 Term 14 40w5d  4.326 kg (9 lb 8.6 oz) F CS-LTranv EPI N GAUDENCIO          Dating reviewed    Allergies and problem list reviewed and updated    Medical and surgical history reviewed    Prenatal labs reviewed and updated    Physical Exam:  ABD: soft, gravid, nontender,     Assessment:  Ann was seen today for routine prenatal visit.    Diagnoses and all orders for this visit:    Pregnancy, supervision, high-risk, third trimester  -     CBC auto differential; Future  -     HIV-1 and HIV-2 antibodies; Future  -     RPR; Future  -     Strep B Screen, Vaginal / Rectal    Obesity complicating pregnancy, third trimester    History of  section complicating pregnancy    Excessive fetal growth affecting management of pregnancy in third trimester, fetus 1         Plan:   follow up labs   follow up 1 Weeks, kick counts, labor precautions

## 2017-09-19 LAB — HIV 1+2 AB+HIV1 P24 AG SERPL QL IA: NEGATIVE

## 2017-09-20 LAB — BACTERIA SPEC AEROBE CULT: NORMAL

## 2017-09-25 ENCOUNTER — HOSPITAL ENCOUNTER (OUTPATIENT)
Dept: PERINATAL CARE | Facility: OTHER | Age: 37
Discharge: HOME | End: 2017-09-25
Attending: OBSTETRICS & GYNECOLOGY
Payer: MEDICAID

## 2017-09-25 ENCOUNTER — ROUTINE PRENATAL (OUTPATIENT)
Dept: OBSTETRICS AND GYNECOLOGY | Facility: CLINIC | Age: 37
End: 2017-09-25
Attending: OBSTETRICS & GYNECOLOGY
Payer: MEDICAID

## 2017-09-25 VITALS
DIASTOLIC BLOOD PRESSURE: 80 MMHG | WEIGHT: 270.94 LBS | BODY MASS INDEX: 43.73 KG/M2 | SYSTOLIC BLOOD PRESSURE: 124 MMHG

## 2017-09-25 DIAGNOSIS — O36.63X1: ICD-10-CM

## 2017-09-25 DIAGNOSIS — O99.213 OBESITY COMPLICATING PREGNANCY, THIRD TRIMESTER: ICD-10-CM

## 2017-09-25 DIAGNOSIS — O34.219 HISTORY OF CESAREAN SECTION COMPLICATING PREGNANCY: ICD-10-CM

## 2017-09-25 DIAGNOSIS — O36.8131 DECREASED FETAL MOVEMENT, THIRD TRIMESTER, FETUS 1: ICD-10-CM

## 2017-09-25 DIAGNOSIS — O09.93 PREGNANCY, SUPERVISION, HIGH-RISK, THIRD TRIMESTER: Primary | ICD-10-CM

## 2017-09-25 PROCEDURE — 3008F BODY MASS INDEX DOCD: CPT | Mod: ,,, | Performed by: OBSTETRICS & GYNECOLOGY

## 2017-09-25 PROCEDURE — 99999 PR PBB SHADOW E&M-EST. PATIENT-LVL II: CPT | Mod: PBBFAC,,, | Performed by: OBSTETRICS & GYNECOLOGY

## 2017-09-25 PROCEDURE — 99212 OFFICE O/P EST SF 10 MIN: CPT | Mod: PBBFAC | Performed by: OBSTETRICS & GYNECOLOGY

## 2017-09-25 PROCEDURE — 59025 FETAL NON-STRESS TEST: CPT | Mod: 26,,, | Performed by: OBSTETRICS & GYNECOLOGY

## 2017-09-25 PROCEDURE — 99213 OFFICE O/P EST LOW 20 MIN: CPT | Mod: TH,S$PBB,, | Performed by: OBSTETRICS & GYNECOLOGY

## 2017-09-25 PROCEDURE — 76815 OB US LIMITED FETUS(S): CPT

## 2017-09-25 PROCEDURE — 76815 OB US LIMITED FETUS(S): CPT | Mod: 26,,, | Performed by: OBSTETRICS & GYNECOLOGY

## 2017-09-25 PROCEDURE — 59025 FETAL NON-STRESS TEST: CPT

## 2017-09-25 NOTE — PROGRESS NOTES
Indication  ========    NST: decreased fetal movement.    History  ======    Risk Factors  History risk factors: AMA  Details: Benign tumor of spinal cord  History risk factors: Obesity    Maternal Assessment  =================    BP syst 122 mmHg  BP diast 72 mmHg    Method  ======    Transabdominal ultrasound examination. View: Good view.    Pregnancy  =========    Padron pregnancy. Number of fetuses: 1.    Dating  ======    Cycle: regular cycle  Assigned: Dating performed on 2017, based on the LMP  Assigned GA 36 w + 6 d  Assigned ABIEL: 10/17/2017    General Evaluation  ==============    Cardiac activity: present.  bpm.  Fetal movements: visualized.  Presentation: cephalic.  Placenta:  Placental site: posterior.  Umbilical cord: Cord vessels: 3 vessel cord.    Non Stress Test  =============    NST interpretation: reactive, fetus w hiccups. Test duration 43 min. Baseline  bpm. Baseline variability: moderate. Accelerations:  present. Decelerations: absent. Uterine activity: present. Acoustic stimulation: no  reports + fetal, less than usual today, denies ctx, vag bleeding or loss of fluid. reviewed [facility]    Amniotic Fluid Assessment  =====================    Amount of AF: normal amount  MVP 4.3 cm    Impression  =========    Reactive and reassuring NST.  Normal amniotic fluid volume.    Recommendation  ==============    Continue  fetal surveillance as clinically indicated.

## 2017-09-25 NOTE — PROGRESS NOTES
Complaints today:decreased movement  Denies ctx, vb, lof    /80   Wt 122.9 kg (270 lb 15.1 oz)   LMP 01/10/2017 (Exact Date)   BMI 43.73 kg/m²     37 y.o., at 36w6d by Estimated Date of Delivery: 10/17/17  Patient Active Problem List   Diagnosis    Back pain, lumbosacral    Reflux    Benign tumor of spinal cord    Undergoing workup for MS    Pregnancy, supervision, high-risk/breast/nfp    Obesity complicating pregnancy    History of  section complicating pregnancy    Benign gestational thrombocytopenia in third trimester    Excessive fetal growth affecting management of pregnancy in third trimester     OB History    Para Term  AB Living   3 1 1 0 1 1   SAB TAB Ectopic Multiple Live Births   0 0 1 0 1      # Outcome Date GA Lbr King/2nd Weight Sex Delivery Anes PTL Lv   3 Current            2 Ectopic 2015     ECTOPIC      1 Term 14 40w5d  4.326 kg (9 lb 8.6 oz) F CS-LTranv EPI N GAUDENCIO          Dating reviewed    Allergies and problem list reviewed and updated    Medical and surgical history reviewed    Prenatal labs reviewed and updated    Physical Exam:  ABD: soft, gravid, nontender,     Assessment:  Ann was seen today for routine prenatal visit.    Diagnoses and all orders for this visit:    Pregnancy, supervision, high-risk, third trimester    Obesity complicating pregnancy, third trimester    History of  section complicating pregnancy    Excessive fetal growth affecting management of pregnancy in third trimester, fetus 1  -     US MFM Procedure (Viewpoint); Future    Decreased fetal movement, third trimester, fetus 1  -     Prenatal Testing-Future (1 visit); Future         Plan:   follow up ultrasound ordered   follow up 1 Weeks, kick counts, labor precautions

## 2017-10-03 ENCOUNTER — ROUTINE PRENATAL (OUTPATIENT)
Dept: OBSTETRICS AND GYNECOLOGY | Facility: CLINIC | Age: 37
End: 2017-10-03
Payer: MEDICAID

## 2017-10-03 VITALS
SYSTOLIC BLOOD PRESSURE: 122 MMHG | WEIGHT: 266.75 LBS | DIASTOLIC BLOOD PRESSURE: 80 MMHG | BODY MASS INDEX: 43.06 KG/M2

## 2017-10-03 DIAGNOSIS — D69.6 BENIGN GESTATIONAL THROMBOCYTOPENIA IN THIRD TRIMESTER: ICD-10-CM

## 2017-10-03 DIAGNOSIS — O99.113 BENIGN GESTATIONAL THROMBOCYTOPENIA IN THIRD TRIMESTER: ICD-10-CM

## 2017-10-03 DIAGNOSIS — O99.213 OBESITY AFFECTING PREGNANCY IN THIRD TRIMESTER: ICD-10-CM

## 2017-10-03 DIAGNOSIS — O36.63X1 EXCESSIVE FETAL GROWTH AFFECTING MANAGEMENT OF PREGNANCY IN THIRD TRIMESTER, FETUS 1 OF MULTIPLE GESTATION: ICD-10-CM

## 2017-10-03 DIAGNOSIS — O09.93 SUPERVISION OF HIGH RISK PREGNANCY IN THIRD TRIMESTER: ICD-10-CM

## 2017-10-03 DIAGNOSIS — O34.219 HISTORY OF CESAREAN SECTION COMPLICATING PREGNANCY: Primary | ICD-10-CM

## 2017-10-03 PROCEDURE — 99999 PR PBB SHADOW E&M-EST. PATIENT-LVL II: CPT | Mod: PBBFAC,,, | Performed by: OBSTETRICS & GYNECOLOGY

## 2017-10-03 PROCEDURE — 99212 OFFICE O/P EST SF 10 MIN: CPT | Mod: PBBFAC,PO | Performed by: OBSTETRICS & GYNECOLOGY

## 2017-10-03 PROCEDURE — 99213 OFFICE O/P EST LOW 20 MIN: CPT | Mod: TH,S$PBB,, | Performed by: OBSTETRICS & GYNECOLOGY

## 2017-10-03 NOTE — PROGRESS NOTES
Complaints today:None. Tired of being pregnant    /80   Wt 121 kg (266 lb 12.1 oz)   LMP 01/10/2017 (Exact Date)   BMI 43.06 kg/m²     37 y.o., at 38w0d by Estimated Date of Delivery: 10/17/17  Patient Active Problem List   Diagnosis    Back pain, lumbosacral    Reflux    Benign tumor of spinal cord    Undergoing workup for MS    Pregnancy, supervision, high-risk/breast/nfp    Obesity complicating pregnancy    History of  section complicating pregnancy - repeat scheduled 10/10 at 0700    Benign gestational thrombocytopenia in third trimester    Excessive fetal growth affecting management of pregnancy in third trimester    Decreased fetal movement     OB History    Para Term  AB Living   3 1 1 0 1 1   SAB TAB Ectopic Multiple Live Births   0 0 1 0 1      # Outcome Date GA Lbr King/2nd Weight Sex Delivery Anes PTL Lv   3 Current            2 Ectopic 2015     ECTOPIC      1 Term 14 40w5d  4.326 kg (9 lb 8.6 oz) F CS-LTranv EPI N GAUDENCIO          Dating reviewed    Allergies and problem list reviewed and updated    Medical and surgical history reviewed    Prenatal labs reviewed and updated    Physical Exam:  ABD: soft, gravid, nontender    Assessment:  Routine ob, doing well    Plan:   F/u 1 week for C/s scheduled on 10/10/17  Kick counts, labor precautions

## 2017-10-05 ENCOUNTER — OFFICE VISIT (OUTPATIENT)
Dept: MATERNAL FETAL MEDICINE | Facility: CLINIC | Age: 37
End: 2017-10-05
Attending: OBSTETRICS & GYNECOLOGY
Payer: MEDICAID

## 2017-10-05 ENCOUNTER — PATIENT MESSAGE (OUTPATIENT)
Dept: OBSTETRICS AND GYNECOLOGY | Facility: CLINIC | Age: 37
End: 2017-10-05

## 2017-10-05 DIAGNOSIS — O36.63X1: ICD-10-CM

## 2017-10-05 DIAGNOSIS — Z36.4 ULTRASOUND FOR ANTENATAL SCREENING FOR FETAL GROWTH RESTRICTION: ICD-10-CM

## 2017-10-05 PROCEDURE — 99499 UNLISTED E&M SERVICE: CPT | Mod: S$PBB,,, | Performed by: OBSTETRICS & GYNECOLOGY

## 2017-10-05 PROCEDURE — 76816 OB US FOLLOW-UP PER FETUS: CPT | Mod: 26,S$PBB,, | Performed by: OBSTETRICS & GYNECOLOGY

## 2017-10-05 PROCEDURE — 76816 OB US FOLLOW-UP PER FETUS: CPT | Mod: PBBFAC | Performed by: OBSTETRICS & GYNECOLOGY

## 2017-10-05 NOTE — PROGRESS NOTES
Indication  ========    Size > dates: EFW, MVP Dr. Weber).    History  ======    General History  Other: Size > dates  Risk Factors  History risk factors: AMA  Details: Benign tumor of spinal cord  History risk factors: Obesity    Pregnancy History  ==============    Maternal Lab Tests  Result: declined screening  Wants to know gender: yes    Method  ======    [place] E10, Transabdominal ultrasound examination. View: Suboptimal view: limited by late gestational age.    Pregnancy  =========    Padron pregnancy. Number of fetuses: 1.    Dating  ======    Cycle: regular cycle  Ultrasound examination on: 10/5/2017  GA by U/S based upon: Femur  GA by U/S 39 w + 5 d  ABIEL by U/S: 10/7/2017  Assigned: Dating performed on 03/22/2017, based on the LMP  Assigned GA 38 w + 2 d  Assigned ABIEL: 10/17/2017    General Evaluation  ==============    Cardiac activity: present.  bpm.  Fetal movements: visualized.  Presentation: cephalic.  Placenta: posterior, fundal.  Umbilical cord: 3 vessel cord.  Amniotic fluid: Amount of AF: normal amount. MVP 6.3 cm.    Fetal Biometry  ============    Fetal Biometry  .7 mm  .3 mm  .6 mm  .9 mm  Femur 77.7 mm 39w 5d Hadlock  EFW 4,832 g >99% Peter  Calculated by: Hadlock (BPD-HC-AC-FL)  EFW (lb) 10 lb  EFW (oz) 10 oz  Cephalic index 0.83  HC / AC 0.91  FL / BPD 0.76  FL / AC 0.20  MVP 6.3 cm   bpm    Fetal Anatomy  ===========    Cranium: normal  4-chamber view: suboptimal  Stomach: normal  Kidneys: normal  Bladder: normal  Wants to know gender: yes  Other: A full anatomy survey previously performed.    Impression  =========    1. 38w 2d padron intrauterine pregnancy  2. EFW = 4832 gms at >99%  3. Limited fetal anatomy: no abnormalities appreciated - see above for details  4. Amniotic fluid volume wnl (MVP 6.3cm)  5. Cephalic presentation .    Recommendation  ==============    f/u sono as clinically indicated  Patient scheduled for C/S on  10-.

## 2017-10-10 ENCOUNTER — HOSPITAL ENCOUNTER (INPATIENT)
Facility: OTHER | Age: 37
LOS: 4 days | Discharge: HOME | End: 2017-10-14
Attending: OBSTETRICS & GYNECOLOGY | Admitting: OBSTETRICS & GYNECOLOGY
Payer: MEDICAID

## 2017-10-10 ENCOUNTER — ANESTHESIA EVENT (OUTPATIENT)
Dept: OBSTETRICS AND GYNECOLOGY | Facility: OTHER | Age: 37
End: 2017-10-10
Payer: MEDICAID

## 2017-10-10 ENCOUNTER — RESEARCH ENCOUNTER (OUTPATIENT)
Dept: RESEARCH | Facility: HOSPITAL | Age: 37
End: 2017-10-10

## 2017-10-10 ENCOUNTER — ANESTHESIA (OUTPATIENT)
Dept: OBSTETRICS AND GYNECOLOGY | Facility: OTHER | Age: 37
End: 2017-10-10
Payer: MEDICAID

## 2017-10-10 DIAGNOSIS — O34.219 HISTORY OF CESAREAN DELIVERY AFFECTING PREGNANCY: ICD-10-CM

## 2017-10-10 DIAGNOSIS — O34.219 HISTORY OF CESAREAN SECTION COMPLICATING PREGNANCY: ICD-10-CM

## 2017-10-10 DIAGNOSIS — Z98.891 S/P CESAREAN SECTION: ICD-10-CM

## 2017-10-10 DIAGNOSIS — O09.93 SUPERVISION OF HIGH RISK PREGNANCY IN THIRD TRIMESTER: Primary | ICD-10-CM

## 2017-10-10 LAB
ABO + RH BLD: NORMAL
ALLENS TEST: ABNORMAL
BASOPHILS # BLD AUTO: 0.01 K/UL
BASOPHILS # BLD AUTO: 0.01 K/UL
BASOPHILS # BLD AUTO: 0.02 K/UL
BASOPHILS NFR BLD: 0.1 %
BASOPHILS NFR BLD: 0.1 %
BASOPHILS NFR BLD: 0.2 %
BLD GP AB SCN CELLS X3 SERPL QL: NORMAL
BLOOD GROUP ANTIBODIES SERPL: NORMAL
DAT IGG-SP REAG RBC-IMP: NORMAL
DIFFERENTIAL METHOD: ABNORMAL
EOSINOPHIL # BLD AUTO: 0.1 K/UL
EOSINOPHIL # BLD AUTO: 0.1 K/UL
EOSINOPHIL # BLD AUTO: 0.2 K/UL
EOSINOPHIL NFR BLD: 0.4 %
EOSINOPHIL NFR BLD: 0.9 %
EOSINOPHIL NFR BLD: 2.1 %
ERYTHROCYTE [DISTWIDTH] IN BLOOD BY AUTOMATED COUNT: 14 %
GIANT PLATELETS BLD QL SMEAR: PRESENT
HCO3 UR-SCNC: 22.7 MMOL/L (ref 24–28)
HCT VFR BLD AUTO: 27.5 %
HCT VFR BLD AUTO: 30.9 %
HCT VFR BLD AUTO: 36.7 %
HGB BLD-MCNC: 10.2 G/DL
HGB BLD-MCNC: 12.2 G/DL
HGB BLD-MCNC: 9.1 G/DL
LYMPHOCYTES # BLD AUTO: 0.8 K/UL
LYMPHOCYTES # BLD AUTO: 1 K/UL
LYMPHOCYTES # BLD AUTO: 1.3 K/UL
LYMPHOCYTES NFR BLD: 15.4 %
LYMPHOCYTES NFR BLD: 7.5 %
LYMPHOCYTES NFR BLD: 9.7 %
MCH RBC QN AUTO: 29.7 PG
MCH RBC QN AUTO: 29.9 PG
MCH RBC QN AUTO: 30 PG
MCHC RBC AUTO-ENTMCNC: 33 G/DL
MCHC RBC AUTO-ENTMCNC: 33.1 G/DL
MCHC RBC AUTO-ENTMCNC: 33.2 G/DL
MCV RBC AUTO: 90 FL
MCV RBC AUTO: 90 FL
MCV RBC AUTO: 91 FL
MONOCYTES # BLD AUTO: 0.4 K/UL
MONOCYTES # BLD AUTO: 0.5 K/UL
MONOCYTES # BLD AUTO: 0.6 K/UL
MONOCYTES NFR BLD: 4.2 %
MONOCYTES NFR BLD: 5 %
MONOCYTES NFR BLD: 7.2 %
NEUTROPHILS # BLD AUTO: 11.4 K/UL
NEUTROPHILS # BLD AUTO: 6.5 K/UL
NEUTROPHILS # BLD AUTO: 7.2 K/UL
NEUTROPHILS NFR BLD: 75.2 %
NEUTROPHILS NFR BLD: 83.9 %
NEUTROPHILS NFR BLD: 87.7 %
PCO2 BLDA: 53.3 MMHG (ref 35–45)
PH SMN: 7.24 [PH] (ref 7.35–7.45)
PLATELET # BLD AUTO: 120 K/UL
PLATELET # BLD AUTO: 72 K/UL
PLATELET # BLD AUTO: 94 K/UL
PLATELET BLD QL SMEAR: ABNORMAL
PLATELET BLD QL SMEAR: ABNORMAL
PMV BLD AUTO: 12.7 FL
PMV BLD AUTO: 12.7 FL
PMV BLD AUTO: 13.3 FL
PO2 BLDA: 12 MMHG (ref 80–100)
POC BE: -5 MMOL/L
POC SATURATED O2: 10 % (ref 95–100)
RBC # BLD AUTO: 3.04 M/UL
RBC # BLD AUTO: 3.44 M/UL
RBC # BLD AUTO: 4.07 M/UL
SAMPLE: ABNORMAL
SITE: ABNORMAL
WBC # BLD AUTO: 13.01 K/UL
WBC # BLD AUTO: 8.57 K/UL
WBC # BLD AUTO: 8.7 K/UL

## 2017-10-10 PROCEDURE — 76815 OB US LIMITED FETUS(S): CPT

## 2017-10-10 PROCEDURE — 86900 BLOOD TYPING SEROLOGIC ABO: CPT

## 2017-10-10 PROCEDURE — 59514 CESAREAN DELIVERY ONLY: CPT | Mod: GB,,, | Performed by: OBSTETRICS & GYNECOLOGY

## 2017-10-10 PROCEDURE — 86880 COOMBS TEST DIRECT: CPT

## 2017-10-10 PROCEDURE — 96372 THER/PROPH/DIAG INJ SC/IM: CPT

## 2017-10-10 PROCEDURE — 86901 BLOOD TYPING SEROLOGIC RH(D): CPT

## 2017-10-10 PROCEDURE — 25000003 PHARM REV CODE 250: Performed by: STUDENT IN AN ORGANIZED HEALTH CARE EDUCATION/TRAINING PROGRAM

## 2017-10-10 PROCEDURE — 36000685 HC CESAREAN SECTION LEVEL I

## 2017-10-10 PROCEDURE — 63600175 PHARM REV CODE 636 W HCPCS: Performed by: ANESTHESIOLOGY

## 2017-10-10 PROCEDURE — 99900035 HC TECH TIME PER 15 MIN (STAT)

## 2017-10-10 PROCEDURE — 37000008 HC ANESTHESIA 1ST 15 MINUTES: Performed by: OBSTETRICS & GYNECOLOGY

## 2017-10-10 PROCEDURE — 86870 RBC ANTIBODY IDENTIFICATION: CPT

## 2017-10-10 PROCEDURE — 82803 BLOOD GASES ANY COMBINATION: CPT

## 2017-10-10 PROCEDURE — 25000003 PHARM REV CODE 250: Performed by: ANESTHESIOLOGY

## 2017-10-10 PROCEDURE — 37000009 HC ANESTHESIA EA ADD 15 MINS: Performed by: OBSTETRICS & GYNECOLOGY

## 2017-10-10 PROCEDURE — 86905 BLOOD TYPING RBC ANTIGENS: CPT

## 2017-10-10 PROCEDURE — 85025 COMPLETE CBC W/AUTO DIFF WBC: CPT | Mod: 91

## 2017-10-10 PROCEDURE — 27201127 HC VACUUM EXTRACTOR

## 2017-10-10 PROCEDURE — S0028 INJECTION, FAMOTIDINE, 20 MG: HCPCS

## 2017-10-10 PROCEDURE — 25000003 PHARM REV CODE 250

## 2017-10-10 PROCEDURE — 63600175 PHARM REV CODE 636 W HCPCS

## 2017-10-10 PROCEDURE — 59514 CESAREAN DELIVERY ONLY: CPT | Mod: ,,, | Performed by: ANESTHESIOLOGY

## 2017-10-10 PROCEDURE — 11000001 HC ACUTE MED/SURG PRIVATE ROOM

## 2017-10-10 RX ORDER — BUPIVACAINE HYDROCHLORIDE 2.5 MG/ML
INJECTION, SOLUTION EPIDURAL; INFILTRATION; INTRACAUDAL
Status: DISPENSED
Start: 2017-10-10 | End: 2017-10-10

## 2017-10-10 RX ORDER — HYDROCORTISONE 25 MG/G
CREAM TOPICAL 3 TIMES DAILY PRN
Status: DISCONTINUED | OUTPATIENT
Start: 2017-10-10 | End: 2017-10-14 | Stop reason: HOSPADM

## 2017-10-10 RX ORDER — DOCUSATE SODIUM 100 MG/1
200 CAPSULE, LIQUID FILLED ORAL 2 TIMES DAILY
Status: DISCONTINUED | OUTPATIENT
Start: 2017-10-10 | End: 2017-10-14 | Stop reason: HOSPADM

## 2017-10-10 RX ORDER — OXYCODONE HYDROCHLORIDE 5 MG/1
5 TABLET ORAL EVERY 6 HOURS PRN
Status: DISCONTINUED | OUTPATIENT
Start: 2017-10-10 | End: 2017-10-10 | Stop reason: SDUPTHER

## 2017-10-10 RX ORDER — DIPHENHYDRAMINE HYDROCHLORIDE 50 MG/ML
INJECTION INTRAMUSCULAR; INTRAVENOUS
Status: DISCONTINUED | OUTPATIENT
Start: 2017-10-10 | End: 2017-10-10

## 2017-10-10 RX ORDER — ADHESIVE BANDAGE
30 BANDAGE TOPICAL 2 TIMES DAILY PRN
Status: DISCONTINUED | OUTPATIENT
Start: 2017-10-11 | End: 2017-10-14 | Stop reason: HOSPADM

## 2017-10-10 RX ORDER — PHENYLEPHRINE HYDROCHLORIDE 10 MG/ML
INJECTION INTRAVENOUS
Status: DISCONTINUED | OUTPATIENT
Start: 2017-10-10 | End: 2017-10-10

## 2017-10-10 RX ORDER — CEFAZOLIN SODIUM 2 G/50ML
2 SOLUTION INTRAVENOUS
Status: DISCONTINUED | OUTPATIENT
Start: 2017-10-10 | End: 2017-10-10

## 2017-10-10 RX ORDER — METHYLERGONOVINE MALEATE 0.2 MG/ML
INJECTION INTRAVENOUS
Status: COMPLETED
Start: 2017-10-10 | End: 2017-10-10

## 2017-10-10 RX ORDER — SIMETHICONE 80 MG
1 TABLET,CHEWABLE ORAL EVERY 6 HOURS PRN
Status: DISCONTINUED | OUTPATIENT
Start: 2017-10-10 | End: 2017-10-14 | Stop reason: HOSPADM

## 2017-10-10 RX ORDER — IBUPROFEN 600 MG/1
600 TABLET ORAL EVERY 6 HOURS
Status: DISCONTINUED | OUTPATIENT
Start: 2017-10-11 | End: 2017-10-14 | Stop reason: HOSPADM

## 2017-10-10 RX ORDER — AMOXICILLIN 250 MG
1 CAPSULE ORAL NIGHTLY PRN
Status: DISCONTINUED | OUTPATIENT
Start: 2017-10-10 | End: 2017-10-14 | Stop reason: HOSPADM

## 2017-10-10 RX ORDER — MIDAZOLAM HYDROCHLORIDE 1 MG/ML
INJECTION INTRAMUSCULAR; INTRAVENOUS
Status: DISCONTINUED | OUTPATIENT
Start: 2017-10-10 | End: 2017-10-10

## 2017-10-10 RX ORDER — PROMETHAZINE HYDROCHLORIDE 25 MG/ML
INJECTION, SOLUTION INTRAMUSCULAR; INTRAVENOUS
Status: DISCONTINUED | OUTPATIENT
Start: 2017-10-10 | End: 2017-10-10

## 2017-10-10 RX ORDER — DIPHENHYDRAMINE HCL 25 MG
25 CAPSULE ORAL EVERY 4 HOURS PRN
Status: DISCONTINUED | OUTPATIENT
Start: 2017-10-10 | End: 2017-10-14 | Stop reason: HOSPADM

## 2017-10-10 RX ORDER — ACETAMINOPHEN 325 MG/1
650 TABLET ORAL EVERY 6 HOURS
Status: COMPLETED | OUTPATIENT
Start: 2017-10-10 | End: 2017-10-11

## 2017-10-10 RX ORDER — ONDANSETRON 2 MG/ML
INJECTION INTRAMUSCULAR; INTRAVENOUS
Status: DISCONTINUED | OUTPATIENT
Start: 2017-10-10 | End: 2017-10-10

## 2017-10-10 RX ORDER — OXYTOCIN/RINGER'S LACTATE 20/1000 ML
PLASTIC BAG, INJECTION (ML) INTRAVENOUS
Status: COMPLETED
Start: 2017-10-10 | End: 2017-10-10

## 2017-10-10 RX ORDER — ACETAMINOPHEN 10 MG/ML
INJECTION, SOLUTION INTRAVENOUS
Status: DISCONTINUED | OUTPATIENT
Start: 2017-10-10 | End: 2017-10-10

## 2017-10-10 RX ORDER — SODIUM CITRATE AND CITRIC ACID MONOHYDRATE 334; 500 MG/5ML; MG/5ML
30 SOLUTION ORAL
Status: DISCONTINUED | OUTPATIENT
Start: 2017-10-10 | End: 2017-10-10

## 2017-10-10 RX ORDER — OXYCODONE AND ACETAMINOPHEN 5; 325 MG/1; MG/1
1 TABLET ORAL EVERY 4 HOURS PRN
Status: DISCONTINUED | OUTPATIENT
Start: 2017-10-11 | End: 2017-10-10

## 2017-10-10 RX ORDER — MISOPROSTOL 200 UG/1
800 TABLET ORAL
Status: DISCONTINUED | OUTPATIENT
Start: 2017-10-10 | End: 2017-10-10

## 2017-10-10 RX ORDER — SODIUM CHLORIDE, SODIUM LACTATE, POTASSIUM CHLORIDE, CALCIUM CHLORIDE 600; 310; 30; 20 MG/100ML; MG/100ML; MG/100ML; MG/100ML
INJECTION, SOLUTION INTRAVENOUS CONTINUOUS
Status: ACTIVE | OUTPATIENT
Start: 2017-10-10 | End: 2017-10-11

## 2017-10-10 RX ORDER — ONDANSETRON 8 MG/1
8 TABLET, ORALLY DISINTEGRATING ORAL EVERY 8 HOURS PRN
Status: DISCONTINUED | OUTPATIENT
Start: 2017-10-10 | End: 2017-10-14 | Stop reason: HOSPADM

## 2017-10-10 RX ORDER — OXYCODONE AND ACETAMINOPHEN 10; 325 MG/1; MG/1
1 TABLET ORAL EVERY 4 HOURS PRN
Status: DISCONTINUED | OUTPATIENT
Start: 2017-10-11 | End: 2017-10-10

## 2017-10-10 RX ORDER — HYDROMORPHONE HYDROCHLORIDE 2 MG/ML
INJECTION, SOLUTION INTRAMUSCULAR; INTRAVENOUS; SUBCUTANEOUS
Status: DISCONTINUED | OUTPATIENT
Start: 2017-10-10 | End: 2017-10-10

## 2017-10-10 RX ORDER — FAMOTIDINE 10 MG/ML
INJECTION INTRAVENOUS
Status: COMPLETED
Start: 2017-10-10 | End: 2017-10-10

## 2017-10-10 RX ORDER — FENTANYL CITRATE 50 UG/ML
INJECTION, SOLUTION INTRAMUSCULAR; INTRAVENOUS
Status: DISCONTINUED | OUTPATIENT
Start: 2017-10-10 | End: 2017-10-10

## 2017-10-10 RX ORDER — OXYTOCIN/RINGER'S LACTATE 20/1000 ML
41.65 PLASTIC BAG, INJECTION (ML) INTRAVENOUS CONTINUOUS
Status: ACTIVE | OUTPATIENT
Start: 2017-10-10 | End: 2017-10-10

## 2017-10-10 RX ORDER — BUPIVACAINE HYDROCHLORIDE 7.5 MG/ML
INJECTION, SOLUTION INTRASPINAL
Status: DISCONTINUED | OUTPATIENT
Start: 2017-10-10 | End: 2017-10-10

## 2017-10-10 RX ORDER — ONDANSETRON 2 MG/ML
4 INJECTION INTRAMUSCULAR; INTRAVENOUS EVERY 8 HOURS PRN
Status: DISCONTINUED | OUTPATIENT
Start: 2017-10-10 | End: 2017-10-14 | Stop reason: HOSPADM

## 2017-10-10 RX ORDER — LIDOCAINE HCL/EPINEPHRINE/PF 2%-1:200K
VIAL (ML) INJECTION
Status: DISCONTINUED | OUTPATIENT
Start: 2017-10-10 | End: 2017-10-10

## 2017-10-10 RX ORDER — SODIUM CHLORIDE, SODIUM LACTATE, POTASSIUM CHLORIDE, CALCIUM CHLORIDE 600; 310; 30; 20 MG/100ML; MG/100ML; MG/100ML; MG/100ML
INJECTION, SOLUTION INTRAVENOUS CONTINUOUS
Status: DISCONTINUED | OUTPATIENT
Start: 2017-10-10 | End: 2017-10-10

## 2017-10-10 RX ORDER — BISACODYL 10 MG
10 SUPPOSITORY, RECTAL RECTAL ONCE AS NEEDED
Status: ACTIVE | OUTPATIENT
Start: 2017-10-10 | End: 2017-10-10

## 2017-10-10 RX ORDER — OXYCODONE HYDROCHLORIDE 5 MG/1
5 TABLET ORAL EVERY 4 HOURS PRN
Status: DISCONTINUED | OUTPATIENT
Start: 2017-10-10 | End: 2017-10-14 | Stop reason: HOSPADM

## 2017-10-10 RX ORDER — FAMOTIDINE 20 MG/1
20 TABLET, FILM COATED ORAL 2 TIMES DAILY
Status: DISCONTINUED | OUTPATIENT
Start: 2017-10-10 | End: 2017-10-10

## 2017-10-10 RX ORDER — OXYTOCIN 10 [USP'U]/ML
INJECTION, SOLUTION INTRAMUSCULAR; INTRAVENOUS
Status: DISCONTINUED | OUTPATIENT
Start: 2017-10-10 | End: 2017-10-10

## 2017-10-10 RX ORDER — OXYCODONE HYDROCHLORIDE 5 MG/1
10 TABLET ORAL EVERY 4 HOURS PRN
Status: DISCONTINUED | OUTPATIENT
Start: 2017-10-10 | End: 2017-10-14 | Stop reason: HOSPADM

## 2017-10-10 RX ADMIN — PHENYLEPHRINE HYDROCHLORIDE 200 MCG: 10 INJECTION INTRAVENOUS at 08:10

## 2017-10-10 RX ADMIN — OXYTOCIN 3 UNITS: 10 INJECTION, SOLUTION INTRAMUSCULAR; INTRAVENOUS at 08:10

## 2017-10-10 RX ADMIN — Medication 41.65 MILLI-UNITS/MIN: at 12:10

## 2017-10-10 RX ADMIN — SODIUM CHLORIDE, SODIUM LACTATE, POTASSIUM CHLORIDE, AND CALCIUM CHLORIDE: 600; 310; 30; 20 INJECTION, SOLUTION INTRAVENOUS at 07:10

## 2017-10-10 RX ADMIN — PHENYLEPHRINE HYDROCHLORIDE 100 MCG: 10 INJECTION INTRAVENOUS at 08:10

## 2017-10-10 RX ADMIN — BUPIVACAINE HYDROCHLORIDE IN DEXTROSE 1.4 ML: 7.5 INJECTION, SOLUTION SUBARACHNOID at 07:10

## 2017-10-10 RX ADMIN — FENTANYL CITRATE 10 MCG: 50 INJECTION, SOLUTION INTRAMUSCULAR; INTRAVENOUS at 07:10

## 2017-10-10 RX ADMIN — LIDOCAINE HYDROCHLORIDE,EPINEPHRINE BITARTRATE 5 ML: 20; .005 INJECTION, SOLUTION EPIDURAL; INFILTRATION; INTRACAUDAL; PERINEURAL at 07:10

## 2017-10-10 RX ADMIN — DEXTROSE 1 G: 50 INJECTION, SOLUTION INTRAVENOUS at 07:10

## 2017-10-10 RX ADMIN — MIDAZOLAM HYDROCHLORIDE 1 MG: 1 INJECTION, SOLUTION INTRAMUSCULAR; INTRAVENOUS at 08:10

## 2017-10-10 RX ADMIN — OXYTOCIN 2 UNITS: 10 INJECTION, SOLUTION INTRAMUSCULAR; INTRAVENOUS at 08:10

## 2017-10-10 RX ADMIN — DOCUSATE SODIUM 200 MG: 100 CAPSULE, LIQUID FILLED ORAL at 09:10

## 2017-10-10 RX ADMIN — METHYLERGONOVINE MALEATE 200 MCG: 0.2 INJECTION, SOLUTION INTRAMUSCULAR; INTRAVENOUS at 08:10

## 2017-10-10 RX ADMIN — SIMETHICONE CHEW TAB 80 MG 80 MG: 80 TABLET ORAL at 09:10

## 2017-10-10 RX ADMIN — OXYTOCIN 4 UNITS: 10 INJECTION, SOLUTION INTRAMUSCULAR; INTRAVENOUS at 08:10

## 2017-10-10 RX ADMIN — OXYTOCIN 5 UNITS: 10 INJECTION, SOLUTION INTRAMUSCULAR; INTRAVENOUS at 08:10

## 2017-10-10 RX ADMIN — PROMETHAZINE HYDROCHLORIDE 12.5 MG: 25 INJECTION INTRAMUSCULAR; INTRAVENOUS at 08:10

## 2017-10-10 RX ADMIN — ONDANSETRON 4 MG: 2 INJECTION INTRAMUSCULAR; INTRAVENOUS at 08:10

## 2017-10-10 RX ADMIN — HYDROMORPHONE HYDROCHLORIDE 75 MCG: 2 INJECTION, SOLUTION INTRAMUSCULAR; INTRAVENOUS; SUBCUTANEOUS at 07:10

## 2017-10-10 RX ADMIN — ACETAMINOPHEN 650 MG: 325 TABLET ORAL at 11:10

## 2017-10-10 RX ADMIN — OXYCODONE HYDROCHLORIDE 5 MG: 5 TABLET ORAL at 12:10

## 2017-10-10 RX ADMIN — LIDOCAINE HYDROCHLORIDE,EPINEPHRINE BITARTRATE 3 ML: 20; .005 INJECTION, SOLUTION EPIDURAL; INFILTRATION; INTRACAUDAL; PERINEURAL at 08:10

## 2017-10-10 RX ADMIN — DEXTROSE 2 G: 50 INJECTION, SOLUTION INTRAVENOUS at 07:10

## 2017-10-10 RX ADMIN — DIPHENHYDRAMINE HYDROCHLORIDE 25 MG: 50 INJECTION, SOLUTION INTRAMUSCULAR; INTRAVENOUS at 08:10

## 2017-10-10 RX ADMIN — SODIUM CITRATE AND CITRIC ACID MONOHYDRATE 30 ML: 500; 334 SOLUTION ORAL at 07:10

## 2017-10-10 RX ADMIN — OXYCODONE HYDROCHLORIDE 10 MG: 5 TABLET ORAL at 04:10

## 2017-10-10 RX ADMIN — SODIUM CHLORIDE, SODIUM LACTATE, POTASSIUM CHLORIDE, AND CALCIUM CHLORIDE 1000 ML: .6; .31; .03; .02 INJECTION, SOLUTION INTRAVENOUS at 05:10

## 2017-10-10 RX ADMIN — OXYCODONE HYDROCHLORIDE 10 MG: 5 TABLET ORAL at 09:10

## 2017-10-10 RX ADMIN — ACETAMINOPHEN 1000 MG: 10 INJECTION, SOLUTION INTRAVENOUS at 08:10

## 2017-10-10 RX ADMIN — FAMOTIDINE 20 MG: 10 INJECTION, SOLUTION INTRAVENOUS at 07:10

## 2017-10-10 RX ADMIN — ACETAMINOPHEN 650 MG: 325 TABLET ORAL at 04:10

## 2017-10-10 NOTE — TRANSFER OF CARE
"Anesthesia Transfer of Care Note    Patient: Ann Godfrey    Procedure(s) Performed: Procedure(s) (LRB):  DELIVERY- SECTION (N/A)    Patient location: PACU    Anesthesia Type: CSE    Transport from OR: Transported from OR on room air with adequate spontaneous ventilation    Post pain: adequate analgesia    Post assessment: no apparent anesthetic complications and tolerated procedure well    Post vital signs: stable    Level of consciousness: awake and alert    Nausea/Vomiting: no nausea/vomiting    Complications: none    Transfer of care protocol was followed      Last vitals:   Visit Vitals  /71   Pulse 79   Temp 36.3 °C (97.3 °F) (Temporal)   Resp 18   Ht 5' 6" (1.676 m)   Wt 117.9 kg (260 lb)   LMP 01/10/2017 (Exact Date)   SpO2 100%   Breastfeeding? No   BMI 41.97 kg/m²     "

## 2017-10-10 NOTE — HPI
Ann Godfrey is a 37 y.o.  female with IUP at 39w0d weeks gestation who is admitted for scheduled  Section secondary to prior c/s x 1.     This IUP is complicated by history of asthma, maternal obesity, and suspected fetal macrosomia w/ EFW >99% (4832g).  Patient denies contractions, denies vaginal bleeding, denies LOF.   Fetal Movement: normal.

## 2017-10-10 NOTE — ANESTHESIA RELEASE NOTE
"Anesthesia Release from PACU Note    Patient: Ann Godfrey    Procedure(s) Performed: Procedure(s) (LRB):  DELIVERY- SECTION (N/A)    Anesthesia type: CSE    Post pain: Adequate analgesia    Post assessment: no apparent anesthetic complications    Last Vitals:   Visit Vitals  BP (!) 143/80   Pulse 83   Temp 36.3 °C (97.3 °F) (Temporal)   Resp 18   Ht 5' 6" (1.676 m)   Wt 117.9 kg (260 lb)   LMP 01/10/2017 (Exact Date)   SpO2 98%   Breastfeeding? Unknown   BMI 41.97 kg/m²       Post vital signs: stable    Level of consciousness: awake, alert  and oriented    Nausea/Vomiting: no nausea/no vomiting    Complications: none    Airway Patency: patent    Respiratory: unassisted    Cardiovascular: stable and blood pressure at baseline    Hydration: euvolemic  "

## 2017-10-10 NOTE — ANESTHESIA POSTPROCEDURE EVALUATION
"Anesthesia Post Evaluation    Patient: Ann Godfrey    Procedure(s) Performed: Procedure(s) (LRB):  DELIVERY- SECTION (N/A)    Final Anesthesia Type: CSE  Patient location during evaluation: labor & delivery  Patient participation: Yes- Able to Participate  Level of consciousness: awake and alert  Post-procedure vital signs: reviewed and stable  Pain management: adequate  Airway patency: patent  PONV status at discharge: No PONV  Anesthetic complications: no      Cardiovascular status: hemodynamically stable  Respiratory status: unassisted, spontaneous ventilation and room air  Hydration status: euvolemic  Follow-up not needed.        Visit Vitals  BP (!) 143/80   Pulse 83   Temp 36.3 °C (97.3 °F) (Temporal)   Resp 18   Ht 5' 6" (1.676 m)   Wt 117.9 kg (260 lb)   LMP 01/10/2017 (Exact Date)   SpO2 98%   Breastfeeding? Unknown   BMI 41.97 kg/m²       Pain/Fernando Score: Pain Rating Prior to Med Admin: 6 (10/10/2017 12:25 PM)      "

## 2017-10-10 NOTE — ASSESSMENT & PLAN NOTE
- Consents signed and to chart  - Admit to Labor and Delivery unit  - Epidural per Anesthesia  - Draw CBC, T&S  - Ancef OCTOR  - To OR for C/S. Case Request is in.   - Notify Staff  - Ultrasound performed, infant in cephalic position.   - Post-Partum Hemorrhage risk - medium

## 2017-10-10 NOTE — SUBJECTIVE & OBJECTIVE
Obstetric History       T1      L1     SAB0   TAB0   Ectopic1   Multiple0   Live Births1       # Outcome Date GA Lbr King/2nd Weight Sex Delivery Anes PTL Lv   3 Current            2 Ectopic 2015     ECTOPIC      1 Term 14 40w5d  4.326 kg (9 lb 8.6 oz) F CS-LTranv EPI N GAUDENCIO      Apgar1:  9                Apgar5: 9        Past Medical History:   Diagnosis Date    Arthritis     spinal    Asthma     was on advair , resolved    Benign tumor of spinal cord     Depression     GERD (gastroesophageal reflux disease)      Past Surgical History:   Procedure Laterality Date    APPENDECTOMY       SECTION      posteriorlaminectomy      For benign spinal tumor;        PTA Medications   Medication Sig    PRENATAL VIT #76/IRON,CARB/FA (PNV 29-1 ORAL) Take 1 tablet by mouth once daily.       Review of patient's allergies indicates:  No Known Allergies     Family History     Problem Relation (Age of Onset)    Liver disease Father        Social History Main Topics    Smoking status: Former Smoker     Packs/day: 1.00     Years: 13.00     Types: Cigarettes     Quit date: 10/20/2012    Smokeless tobacco: Former User    Alcohol use No    Drug use: No    Sexual activity: Yes     Partners: Male     Birth control/ protection: None     Review of Systems   Constitutional: Negative for chills and fever.   Eyes: Negative for visual disturbance.   Respiratory: Negative for shortness of breath.    Cardiovascular: Negative for chest pain and leg swelling.   Gastrointestinal: Negative for abdominal pain, nausea and vomiting.   Genitourinary: Negative for dysuria and vaginal bleeding.   Neurological: Negative for headaches.      Objective:     Vital Signs (Most Recent):  Temp: 97.2 °F (36.2 °C) (10/10/17 0525)  Pulse: 88 (10/10/17 0555)  Resp: 18 (10/10/17 0525)  BP: 128/83 (10/10/17 0525)  SpO2: 98 % (10/10/17 0555) Vital Signs (24h Range):  Temp:  [97.2 °F (36.2 °C)] 97.2 °F (36.2 °C)  Pulse:  [80-90]  88  Resp:  [18] 18  SpO2:  [97 %-98 %] 98 %  BP: (128)/(83) 128/83        There is no height or weight on file to calculate BMI.    FHT: 130 bpm Cat 1 (reassuring)  TOCO: none    Physical Exam:   Constitutional: She is oriented to person, place, and time. She appears well-developed and well-nourished. No distress.       Cardiovascular: Normal rate and regular rhythm.     Pulmonary/Chest: Effort normal.        Abdominal: Soft. She exhibits no distension. There is no tenderness.             Musculoskeletal: She exhibits no edema.       Neurological: She is alert and oriented to person, place, and time.    Skin: Skin is warm and dry.    Psychiatric: She has a normal mood and affect.       Cervix: def  Presentation: Vertex     Significant Labs:  Lab Results   Component Value Date    GROUPTRH O POS 03/22/2017    HEPBSAG Negative 03/22/2017    STREPBCULT No Group B Streptococcus isolated 09/18/2017       I have personallly reviewed all pertinent lab results from the last 24 hours.

## 2017-10-10 NOTE — PROGRESS NOTES
..PREVENA-C .167.B     Ann Godfrey was admitted on 2017 for delivery via  section. I approached her in recovery before her surgery and we discussed the study briefly. She declined participation because she has sensitive skin and didn't want to risk it being irritated.

## 2017-10-10 NOTE — ANESTHESIA PREPROCEDURE EVALUATION
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    Also s/p radiation therapy to c spine.       OB History    Para Term  AB Living   3 1 1 0 1 1   SAB TAB Ectopic Multiple Live Births   0 0 1 0 1      # Outcome Date GA Lbr King/2nd Weight Sex Delivery Anes PTL Lv   3 Current            2 Ectopic 2015     ECTOPIC      1 Term 14 40w5d  4.326 kg (9 lb 8.6 oz) F CS-LTranv EPI N GAUDENCIO          Wt Readings from Last 1 Encounters:   10/10/17 0525 117.9 kg (260 lb)       BP Readings from Last 3 Encounters:   10/10/17 128/83   10/03/17 122/80   17 124/80       Patient Active Problem List   Diagnosis    Back pain, lumbosacral    Reflux    Benign tumor of spinal cord    Undergoing workup for MS    Pregnancy, supervision, high-risk/breast/nfp    Obesity complicating pregnancy    History of  section complicating pregnancy - repeat scheduled 10/10 at 0700    Benign gestational thrombocytopenia in third trimester    Excessive fetal growth affecting management of pregnancy in third trimester    Decreased fetal movement    History of  delivery affecting pregnancy       Past Surgical History:   Procedure Laterality Date    APPENDECTOMY       SECTION      posteriorlaminectomy      For benign spinal tumor;        Social History     Social History    Marital status:      Spouse name: N/A    Number of children: N/A    Years of education: N/A     Occupational History    Not on file.     Social History Main Topics    Smoking status: Former Smoker     Packs/day: 1.00     Years: 13.00     Types: Cigarettes     Quit date: 10/20/2012    Smokeless tobacco: Former User    Alcohol use No    Drug use: No    Sexual activity: Yes     Partners: Male     Birth control/  protection: None     Other Topics Concern    Not on file     Social History Narrative    No narrative on file         Chemistry        Component Value Date/Time     07/08/2015 1218     07/08/2015 1218    K 4.1 07/08/2015 1218    K 4.1 07/08/2015 1218     07/08/2015 1218     07/08/2015 1218    CO2 23 07/08/2015 1218    CO2 23 07/08/2015 1218    BUN 8 07/08/2015 1218    BUN 8 07/08/2015 1218    CREATININE 0.6 07/08/2015 1218    CREATININE 0.6 07/08/2015 1218     (H) 07/08/2015 1218     (H) 07/08/2015 1218        Component Value Date/Time    CALCIUM 9.4 07/08/2015 1218    CALCIUM 9.4 07/08/2015 1218    ALKPHOS 81 07/08/2015 1218    AST 19 07/08/2015 1218    ALT 28 07/08/2015 1218    BILITOT 0.8 07/08/2015 1218    ESTGFRAFRICA >60 07/08/2015 1218    ESTGFRAFRICA >60 07/08/2015 1218    EGFRNONAA >60 07/08/2015 1218    EGFRNONAA >60 07/08/2015 1218            Lab Results   Component Value Date    WBC 8.70 10/10/2017    HGB 12.2 10/10/2017    HCT 36.7 (L) 10/10/2017    MCV 90 10/10/2017     (L) 10/10/2017       No results for input(s): INR, PROTIME, APTT in the last 72 hours.    Invalid input(s): PT                    Anesthesia Evaluation    I have reviewed the Patient Summary Reports.     I have reviewed the Medications.     Review of Systems  Anesthesia Hx:  No problems with previous Anesthesia    Cardiovascular:  Cardiovascular Normal     Pulmonary:  Pulmonary Normal    Hepatic/GI:   GERD Denies Liver Disease. Denies Hepatitis.    Neurological:  Neurology Normal    Endocrine:  Endocrine Normal        Physical Exam  General:  Well nourished    Airway/Jaw/Neck:  Airway Findings: Mouth Opening: Normal Tongue: Normal  Mallampati: III  Improves to III with phonation.  TM Distance: Normal, at least 6 cm     Eyes/Ears/Nose:  EYES/EARS/NOSE FINDINGS: Normal   Dental:  Dental Findings: In tact   Chest/Lungs:  Chest/Lungs Findings: Normal Respiratory Rate     Heart/Vascular:  Heart  Findings: Rate: Normal  Rhythm: Regular Rhythm        Mental Status:  Mental Status Findings: Normal        Anesthesia Plan  Type of Anesthesia, risks & benefits discussed:  Anesthesia Type:  CSE, epidural, general, spinal  Patient's Preference:   Intra-op Monitoring Plan: standard ASA monitors  Intra-op Monitoring Plan Comments:   Post Op Pain Control Plan:   Post Op Pain Control Plan Comments:   Induction:   IV  Beta Blocker:  Patient is not currently on a Beta-Blocker (No further documentation required).       Informed Consent: Patient understands risks and agrees with Anesthesia plan.  Questions answered. Anesthesia consent signed with patient.  ASA Score: 3     Day of Surgery Review of History & Physical:    H&P update referred to the surgeon.         Ready For Surgery From Anesthesia Perspective.

## 2017-10-10 NOTE — L&D DELIVERY NOTE
[facility]   Section   Operative Note     Section Operative Note  Procedure Date: 10/10/2017     Procedure: Repeat Low Transverse  Section via Pfannensteil skin incision    Indications: history of     Pre-operative Diagnosis: IUP at 39 week 0 day pregnancy    Post-operative Diagnosis: same    Surgeon: Dr. Loyd Piedra MD     Assistants: Hi Hamilton MD PGY1    Anesthesia: Epidural anesthesia and Spinal anesthesia    Findings: Single viable female infant    Estimated Blood Loss:  4250           Total IV Fluids: 4500 mL     UOP: 300 mL    Specimens: single viable female infant , placenta which was discarded    PreOp CBC:   Lab Results   Component Value Date    WBC 8.57 10/10/2017    HGB 9.1 (L) 10/10/2017    HCT 27.5 (L) 10/10/2017    MCV 91 10/10/2017    PLT 72 (L) 10/10/2017                     Complications:  None; patient tolerated the procedure well.           Disposition: PACU - hemodynamically stable.           Condition: stable    Procedure Details   The patient was seen in the Holding Room. The risks, benefits, complications, treatment options, and expected outcomes were discussed with the patient.  The patient concurred with the proposed plan, giving informed consent.  The site of surgery properly noted. The patient was taken to the Operating Room, identified as Ann Godfrey and the procedure verified as Repeat Low Transverse  Delivery. A Time Out was held and the above information confirmed.    After induction of anesthesia, the patient was prepped and draped in the usual sterile manner while placed in a dorsal supine position with a left lateral tilt.  A conner catheter was also placed per nursing. Preoperative antibiotics were administered and an allis test was performed yielding adequate anesthesia.  A Pfannenstiel incision was made and carried down through the subcutaneous tissue to the fascia. Fascial incision was made and extended  transversely. The fascia was grasped with Kocher clamps and  from the underlying rectus tissue. The peritoneum was identified, found to be free of adherent bowel and entered bluntly. Peritoneal incision was extended longitudinally. The bladder blade was inserted to keep the bladder out of the operative field. A low transverse uterine incision was made with knife and extended with blunt dissection. The amniotic sac was ruptured with blunt dissection and the infant was noted to be in vertex position. The head was brought to the incision and elevated out of the pelvis with vacuum assistance. The patient delivered a single viable female infant weighing 4451 grams with Apgar scores of 6/9 at one and five minutes respectively. After the umbilical cord was clamped and cut cord blood was obtained for evaluation. The placenta was removed intact and appeared normal and was discarded. The uterus was exteriorized. The uterine outline, tubes and ovaries appeared normal. The uterine incision was closed with running locked sutures of #1 chromic. A second layer was placed over the right half of the hysterotomy. IM methergine was given. Hemostasis was observed. The uterus was returned to the abdominal cavity. Incision was reinspected and good hemostasis was noted. The abdominal cavity was irrigated to remove clots. The peritoneum was reapproximated with 2-0 vicryl. The fascia was then reapproximated with running sutures of #1 PDS. The subcutaneous fat and skin were reapproximated with 3-0 Vicryl and 4-0 Monocryl respectively.    Instrument, sponge, and needle counts were correct prior the abdominal closure and at the conclusion of the case.     Pt tolerated procedure well and Dr. Piedra discussed with family that pt was stable and in good condition after the procedure.         Delivery Information for  Girl Ann Godfrey    Birth information:  YOB: 2015   Time of birth: 8:16 AM   Sex: female   Head Delivery  Date/Time: 10/10/2017  8:16 AM   Delivery type: , Low Transverse   Gestational Age: 39w0d    Delivery Providers    Delivering clinician:  Loyd Piedra MD   Other personnel:   Provider Role   MD Jasmin Woods, JENNY Sterling MD                 Measurements    Weight:  4451 g Length:  55.9 cm   Head circum.:  38.1 cm Chest circum.:  35.6 cm           Assessment    Living status:  Living  Apgars:     1 Minute:   5 Minute:   10 Minute:   15 Minute:   20 Minute:     Skin Color:   0  1       Heart Rate:   2  2       Reflex Irritability:   1  2       Muscle Tone:   1  2       Respiratory Effort:   2  2       Total:   6  9               Apgars Assigned By:  QUICK/NICU         Assisted Delivery Details:    Forceps attempted?:  No  Vacuum extractor attempted?:  Yes  Vacuum type:  flat, Kiwi  First attempt time vacuum applied:  10/10/2017 0816  First attempt time vacuum removed:  10/10/2017 0816  Number of pop offs:  0  Number of pulls with vacuum:  1  Vacuum applied by:  HELDER  Failed vacuum delivery?:  No         Shoulder Dystocia    Shoulder dystocia present?:  No           Presentation and Position    Presentation:   Vertex   Position:   Middle    Occiput            Interventions/Resuscitation    Method:  NICU Attended, Blow By Oxygen, Tactile Stimulation, PPV       Cord    Vessels:  3 vessels  Complications:  None  Delayed Cord Clamping?:  No  Cord Blood Disposition:  Sent with Baby  Gases Sent?:  Yes  Stem Cell Collection (by MD):  No       Placenta    Date and time:  10/10/2017  8:17 AM  Removal:  Manual removal  Appearance:  Intact  Placenta disposition:  discarded           Labor Events:       labor: No     Labor Onset Date/Time:         Dilation Complete Date/Time:         Start Pushing Date/Time:       Rupture Date/Time:              Rupture type:           Fluid Amount:        Fluid Color:        Fluid Odor:        Membrane Status  (PeriCalm):        Rupture Date/Time (PeriCalm):        Fluid Amount (PeriCalm):        Fluid Color (PeriCalm):         steroids: None     Antibiotics given for GBS: No     Induction: none     Indications for induction:        Augmentation:       Indications for augmentation:       Labor complications: None     Additional complications:          Cervical ripening:                     Delivery:      Episiotomy: None     Indication for Episiotomy:       Perineal Lacerations: None Repaired:      Periurethral Laceration: none Repaired:     Labial Laceration: none Repaired:     Sulcus Laceration: none Repaired:     Vaginal Laceration: No Repaired:     Cervical Laceration: No Repaired:     Repair suture:       Repair # of packets:       Vaginal delivery QBL (mL): 0      QBL (mL): 0     Combined Blood Loss (mL): 0     Vaginal Sweep Performed: No     Surgicount Correct: Yes       Other providers:       Anesthesia    Method:  Spinal, Epidural          Details (if applicable):  Trial of Labor No    Categorization: Repeat    Priority: Routine   Indications for : Macrosomia   Incision Type: low transverse     Additional  information:  Forceps:    Vacuum:    Breech:    Observed anomalies    Other (Comments):           Hi Hamilton MD  PGY1, OBN  [facility]

## 2017-10-10 NOTE — LACTATION NOTE
Visited mother in room,awake,alert;  baby sleeping on her chest.  Basic breastfeeding instructions provided verbally, states she nursed her first baby for 11 months, denies questions at this time, appears confident.  Requested that mother call for observation of next feeding.

## 2017-10-10 NOTE — ANESTHESIA PROCEDURE NOTES
CSE    Patient location during procedure: OR  Start time: 10/10/2017 7:40 AM  Timeout: 10/10/2017 7:40 AM  End time: 10/10/2017 7:50 AM  Staffing  Anesthesiologist: MARILYN LUKE  Resident/CRNA: JUAN MIGUEL MANUEL  Performed: resident/CRNA   Preanesthetic Checklist  Completed: patient identified, site marked, surgical consent, pre-op evaluation, timeout performed, IV checked, risks and benefits discussed and monitors and equipment checked  CSE  Patient position: sitting  Prep: ChloraPrep  Patient monitoring: heart rate, continuous pulse ox and frequent blood pressure checks  Approach: midline  Spinal Needle  Needle type: pencil-tip   Needle gauge: 22 G  Needle length: 4 in  Epidural Needle  Injection technique: GEOFF saline  Needle type: Tuohy   Needle gauge: 17 G  Needle length: 3.5 in  Needle insertion depth: 9 cm  Location: L4-5  Needle localization: anatomical landmarks  Catheter  Catheter type: end hole  Catheter size: 18 G  Catheter at skin depth: 14 cm  Test dose: lidocaine 1.5% with Epi 1-to-200,000  Additional Documentation: incremental injection, negative aspiration for CSF, negative aspiration for heme, no paresthesia on injection and negative test dose  Assessment  Sensory level: T5   Dermatomal levels determined by pinch or prick  Medications:  Bolus administered: 8 mL of 2.0 lidocaine  Intrathecal Medications:  Bolus administered: 1.4 mL of 0.75 bupivacaine  administered: primary anesthetic and 10 mcg of  fentanyl (dilaudid 0.075 mg)

## 2017-10-11 LAB
BASOPHILS # BLD AUTO: 0.01 K/UL
BASOPHILS NFR BLD: 0.1 %
DIFFERENTIAL METHOD: ABNORMAL
EOSINOPHIL # BLD AUTO: 0.1 K/UL
EOSINOPHIL NFR BLD: 0.7 %
ERYTHROCYTE [DISTWIDTH] IN BLOOD BY AUTOMATED COUNT: 14.1 %
HCT VFR BLD AUTO: 26.8 %
HGB BLD-MCNC: 8.8 G/DL
LYMPHOCYTES # BLD AUTO: 0.9 K/UL
LYMPHOCYTES NFR BLD: 9.4 %
MCH RBC QN AUTO: 29.6 PG
MCHC RBC AUTO-ENTMCNC: 32.8 G/DL
MCV RBC AUTO: 90 FL
MONOCYTES # BLD AUTO: 0.6 K/UL
MONOCYTES NFR BLD: 5.7 %
NEUTROPHILS # BLD AUTO: 8.2 K/UL
NEUTROPHILS NFR BLD: 83.8 %
PLATELET # BLD AUTO: 86 K/UL
PMV BLD AUTO: 13 FL
RBC # BLD AUTO: 2.97 M/UL
WBC # BLD AUTO: 9.79 K/UL

## 2017-10-11 PROCEDURE — 99232 SBSQ HOSP IP/OBS MODERATE 35: CPT | Mod: ,,, | Performed by: OBSTETRICS & GYNECOLOGY

## 2017-10-11 PROCEDURE — 51702 INSERT TEMP BLADDER CATH: CPT

## 2017-10-11 PROCEDURE — 25000003 PHARM REV CODE 250: Performed by: STUDENT IN AN ORGANIZED HEALTH CARE EDUCATION/TRAINING PROGRAM

## 2017-10-11 PROCEDURE — 85025 COMPLETE CBC W/AUTO DIFF WBC: CPT

## 2017-10-11 PROCEDURE — 36415 COLL VENOUS BLD VENIPUNCTURE: CPT

## 2017-10-11 PROCEDURE — 11000001 HC ACUTE MED/SURG PRIVATE ROOM

## 2017-10-11 RX ORDER — IRON POLYSACCHARIDE COMPLEX 150 MG
150 CAPSULE ORAL 2 TIMES DAILY
Status: DISCONTINUED | OUTPATIENT
Start: 2017-10-11 | End: 2017-10-14 | Stop reason: HOSPADM

## 2017-10-11 RX ORDER — HYDROCODONE BITARTRATE AND ACETAMINOPHEN 5; 325 MG/1; MG/1
1 TABLET ORAL EVERY 4 HOURS PRN
Status: DISCONTINUED | OUTPATIENT
Start: 2017-10-11 | End: 2017-10-14 | Stop reason: HOSPADM

## 2017-10-11 RX ORDER — HYDROCODONE BITARTRATE AND ACETAMINOPHEN 10; 325 MG/1; MG/1
1 TABLET ORAL EVERY 4 HOURS PRN
Status: DISCONTINUED | OUTPATIENT
Start: 2017-10-11 | End: 2017-10-14 | Stop reason: HOSPADM

## 2017-10-11 RX ORDER — HYDROCODONE BITARTRATE AND ACETAMINOPHEN 5; 325 MG/1; MG/1
1 TABLET ORAL EVERY 4 HOURS PRN
Qty: 30 TABLET | Refills: 0 | Status: SHIPPED | OUTPATIENT
Start: 2017-10-11 | End: 2018-07-09

## 2017-10-11 RX ORDER — IBUPROFEN 600 MG/1
600 TABLET ORAL EVERY 6 HOURS
Qty: 45 TABLET | Refills: 2 | Status: SHIPPED | OUTPATIENT
Start: 2017-10-11 | End: 2018-07-09

## 2017-10-11 RX ADMIN — Medication 150 MG: at 08:10

## 2017-10-11 RX ADMIN — SIMETHICONE CHEW TAB 80 MG 80 MG: 80 TABLET ORAL at 06:10

## 2017-10-11 RX ADMIN — OXYCODONE HYDROCHLORIDE 10 MG: 5 TABLET ORAL at 01:10

## 2017-10-11 RX ADMIN — Medication 150 MG: at 09:10

## 2017-10-11 RX ADMIN — OXYCODONE HYDROCHLORIDE 10 MG: 5 TABLET ORAL at 06:10

## 2017-10-11 RX ADMIN — IBUPROFEN 600 MG: 600 TABLET, FILM COATED ORAL at 06:10

## 2017-10-11 RX ADMIN — IBUPROFEN 600 MG: 600 TABLET, FILM COATED ORAL at 11:10

## 2017-10-11 RX ADMIN — ACETAMINOPHEN 650 MG: 325 TABLET ORAL at 06:10

## 2017-10-11 RX ADMIN — HYDROCODONE BITARTRATE AND ACETAMINOPHEN 1 TABLET: 10; 325 TABLET ORAL at 08:10

## 2017-10-11 RX ADMIN — DOCUSATE SODIUM 200 MG: 100 CAPSULE, LIQUID FILLED ORAL at 08:10

## 2017-10-11 RX ADMIN — ACETAMINOPHEN 650 MG: 325 TABLET ORAL at 11:10

## 2017-10-11 RX ADMIN — DOCUSATE SODIUM 200 MG: 100 CAPSULE, LIQUID FILLED ORAL at 09:10

## 2017-10-11 RX ADMIN — IBUPROFEN 600 MG: 600 TABLET, FILM COATED ORAL at 09:10

## 2017-10-11 RX ADMIN — SIMETHICONE CHEW TAB 80 MG 80 MG: 80 TABLET ORAL at 08:10

## 2017-10-11 NOTE — PLAN OF CARE
Problem: Patient Care Overview  Goal: Plan of Care Review  Outcome: Ongoing (interventions implemented as appropriate)  To breastfeed baby 8x or more in 24 hours, feed on cue. To practice correct latch and positioning with breast compression during feeding and skin to skin during feeding.  To observe for signs of milk transfer during feeding.  To call for further breastfeeding assistance/ support if needed.

## 2017-10-11 NOTE — PROGRESS NOTES
Spoke with Dr. Bradford regarding pt's plan of care. MD states to leave conner in place overnight and repeat cbc at 0430.

## 2017-10-11 NOTE — PHYSICIAN QUERY
"PT Name: Ann Godfrey  MR #: [id number]    Physician Query Form - Hematology Clarification      CDS/: SON Darden,[email or web address]         Contact information:[email or web address]    This form is a permanent document in the medical record.      Query Date: 2017    By submitting this query, we are merely seeking further clarification of documentation. Please utilize your independent clinical judgment when addressing the question(s) below.    The Medical record contains the following:   Indicators  Supporting Clinical Findings Location in Medical Record    "Anemia" documented     X H & H = Hgb=8.8-12.2  Hct=26.8-36.7 LAB 10/10-10/11    BP =                     HR=      "GI bleeding" documented     X Acute bleeding (Non GI site) Estimated Blood Loss:  4250 L&D Delivery note 10/10    Transfusion(s)     X Treatment: Begin iron and colace and continue to monitor OB Progress note 10/11@659am   X Other:  Procedure: Repeat Low Transverse  Section via Pfannensteil skin incision L&D Delivery note 10/10     Provider, please specify diagnosis or diagnoses associated with above clinical findings.    [  ] Acute blood loss anemia expected post-operatively  [x  ] Acute blood loss anemia  [  ] Iron deficiency anemia  [  ] Other Hematological Diagnosis (please specify): _________________________________  [  ] Clinically Undetermined       Please document in your progress notes daily for the duration of treatment, until resolved, and include in your discharge summary.                                                                                                      "

## 2017-10-11 NOTE — ANESTHESIA POSTPROCEDURE EVALUATION
"Anesthesia Post Evaluation    Patient: Ann Godfrey    Procedure(s) Performed: Procedure(s) (LRB):  DELIVERY- SECTION (N/A)    Final Anesthesia Type: CSE  Patient location during evaluation: labor & delivery  Patient participation: Yes- Able to Participate  Level of consciousness: awake and alert and oriented  Post-procedure vital signs: reviewed and stable  Pain management: adequate  Airway patency: patent  PONV status at discharge: No PONV  Anesthetic complications: no      Cardiovascular status: blood pressure returned to baseline  Respiratory status: room air, unassisted and spontaneous ventilation  Hydration status: euvolemic  Follow-up not needed.        Visit Vitals  /69   Pulse 84   Temp 37.1 °C (98.7 °F) (Oral)   Resp 18   Ht 5' 6" (1.676 m)   Wt 117.9 kg (260 lb)   LMP 01/10/2017 (Exact Date)   SpO2 95%   Breastfeeding? Yes   BMI 41.97 kg/m²       Pain/Fernando Score: Pain Rating Prior to Med Admin: 5 (10/11/2017 11:07 AM)  Pain Rating Post Med Admin: 5 (10/11/2017  2:15 AM)    - Pt is ambulating with assistance and sensation to light touch intact bilaterally     "

## 2017-10-11 NOTE — ASSESSMENT & PLAN NOTE
- platelets pre delivery were 128. Immediately postpartum, they dropped to 72. Repeat draws show platelets to be stable at 94 yesterday evening and 86 this morning. Bleeding stable. Will continue to monitor

## 2017-10-11 NOTE — PROGRESS NOTES
[facility]  Obstetrics  Postpartum Progress Note    Patient Name: Ann Godfrey  MRN: [id number]  Admission Date: 10/10/2017  Hospital Length of Stay: 1 days  Attending Physician: Yamileth Weber MD  Primary Care Provider: Dunia Schafer MD    Subjective:     Principal Problem:History of  delivery affecting pregnancy    Hospital course: 10/10/2017 - admitted for RLTCS. Postpartum hemorrhage with QBL of 4250. Given methergine x 1 in OR. H&H at end of case: 72. Repeat drawn 6 hours post surgery shows stable blood count. Her urine output and VS remained stable through the night  2017 - POD#1 s/p RLTCS. Pt doing well this morning. Conner removed this AM.    Interval History:     She is doing well this morning. She is tolerating a regular diet without nausea or vomiting. She is not yet voiding spontaneously -conner recently removed. She is ambulating. She has passed flatus, and has not a BM. Vaginal bleeding is mild. She reports fever or chills. Abdominal pain is moderate and controlled with oral medications. She is breastfeeding.     Objective:     Vital Signs (Most Recent):  Temp: 99.1 °F (37.3 °C) (10/11/17 0433)  Pulse: 98 (10/11/17 0433)  Resp: 18 (10/11/17 0433)  BP: 118/72 (10/11/17 0433)  SpO2: 97 % (10/11/17 0433) Vital Signs (24h Range):  Temp:  [97.3 °F (36.3 °C)-99.1 °F (37.3 °C)] 99.1 °F (37.3 °C)  Pulse:  [66-98] 98  Resp:  [18] 18  SpO2:  [90 %-100 %] 97 %  BP: (103-143)/(60-92) 118/72     Weight: 117.9 kg (260 lb)  Body mass index is 41.97 kg/m².      Intake/Output Summary (Last 24 hours) at 10/11/17 0653  Last data filed at 10/11/17 0606   Gross per 24 hour   Intake             4500 ml   Output             7475 ml   Net            -2975 ml       Significant Labs:  Lab Results   Component Value Date    GROUPTRH O POS 10/10/2017    HEPBSAG Negative 2017    STREPBCULT No Group B Streptococcus isolated 2017       Recent Labs  Lab 10/11/17  0430   HGB 8.8*    HCT 26.8*       CBC:   Recent Labs  Lab 10/11/17  0430   WBC 9.79   RBC 2.97*   HGB 8.8*   HCT 26.8*   PLT 86*   MCV 90   MCH 29.6   MCHC 32.8       Physical Exam:   Constitutional: She is oriented to person, place, and time. She appears well-developed and well-nourished. No distress.    HENT:   Head: Normocephalic and atraumatic.    Eyes: EOM are normal.    Neck: Normal range of motion.    Cardiovascular: Normal rate, regular rhythm, normal heart sounds and intact distal pulses.     Pulmonary/Chest: Effort normal and breath sounds normal.        Abdominal: Soft. She exhibits abdominal incision (bandage in place with minimal drainage). She exhibits no distension. There is no tenderness. There is no guarding.             Musculoskeletal: Normal range of motion. She exhibits no edema or tenderness.       Neurological: She is alert and oriented to person, place, and time.    Skin: Skin is warm and dry. She is not diaphoretic.    Psychiatric: She has a normal mood and affect.       Assessment/Plan:     37 y.o. female  for:    * History of  delivery affecting pregnancy    Postpartum care:  - Patient doing well. Continue routine management and advances.  - Jackson removed this AM. Await spontaneous void.  - Continue PO pain meds. Pain well controlled.  - Encourage ambulation  - Contraception to be discussed at postpartum visit  - Lactation PRN Lactation consult          Postpartum hemorrhage    -CBL 4250cc. S/p methergine x1 intraop.   -H/H prior to surgery was 12/36. Immediately postpartum, h/H . Yesterday evening, h/H 10/30. This AM 8.8/26.8  -UOP adequate. VSS. Pt asymptomatic.   -Begin iron and colace and continue to monitor.        Excessive fetal growth affecting management of pregnancy in third trimester    S/p RLTCS        Benign gestational thrombocytopenia in third trimester    - platelets pre delivery were 128. Immediately postpartum, they dropped to 72. Repeat draws show platelets to be  stable at 94 yesterday evening and 86 this morning. Bleeding stable. Will continue to monitor            Disposition: As patient meets milestones, will plan to discharge POD#2-4.    Kourtney Nobles MD  Obstetrics [facility]    Doing well, no questions,no problems,ambulating without symptoms.  I have reviewed the resident's note, evaluated the patient and agree with the diagnosis and management plan

## 2017-10-11 NOTE — ASSESSMENT & PLAN NOTE
-CBL 4250cc. S/p methergine x1 intraop.   -H/H prior to surgery was 12/36. Immediately postpartum, h/H 9/27. Yesterday evening, h/H 10/30. This AM 8.8/26.8  -UOP adequate. VSS. Pt asymptomatic.   -Begin iron and colace and continue to monitor.

## 2017-10-11 NOTE — ASSESSMENT & PLAN NOTE
Postpartum care:  - Patient doing well. Continue routine management and advances.  - Jackson removed this AM. Await spontaneous void.  - Continue PO pain meds. Pain well controlled.  - Encourage ambulation  - Contraception to be discussed at postpartum visit  - Lactation PRN Lactation consult

## 2017-10-11 NOTE — LACTATION NOTE
Seen pt for breastfeeding counseling. Pt verbalized that baby was sleepy, assessed baby and was showing early hunger cues. Offered breastfeeding assistance. Baby latched to the breast but assisted to promote deeper latch.  Encouraged breast compression. Pt denies pain. Reinforced the basics of breastfeeding. Pt verbalized understanding. To dioni for further assistance.      10/11/17 1350   Maternal Infant Assessment   Breast Shape Bilateral:;pendulous   Breast Density Bilateral:;soft   Areola Bilateral:;elastic   Nipple(s) Bilateral:;everted   Infant Assessment   Sucking Reflex present   Rooting Reflex present   Swallow Reflex present   LATCH Score   Latch 1-->repeated attempts, holds nipple in mouth, stimulate to suck   Audible Swallowing 1-->a few with stimulation   Type Of Nipple 2-->everted (after stimulation)   Comfort (Breast/Nipple) 2-->soft/nontender   Hold (Positioning) 1-->minimal assist, teach one side: mother does other, staff holds   Score (less than 7 for 2/more consecutive times, consult Lactation Consultant) 7       Number Scale   Presence of Pain denies   Location nipple(s)   Maternal Infant Feeding   Maternal Emotional State independent   Time Spent (min) 15-30 min   Latch Assistance other (see comments)  (minimal)   Breastfeeding Education adequate infant intake;importance of skin-to-skin contact  (breast comprssion)   Feeding Infant   Skin-to-Skin Contact During Feeding no  (man visitors in the room mom was not comfortable)   Lactation Referrals   Lactation Consult Breastfeeding assessment;Initial assessment   Lactation Interventions   Attachment Promotion counseling provided   Breastfeeding Assistance infant latch-on verified;feeding session observed;support offered   Maternal Breastfeeding Support lactation counseling provided

## 2017-10-11 NOTE — SUBJECTIVE & OBJECTIVE
Hospital course: 10/10/2017 - admitted for RLTCS. Postpartum hemorrhage with QBL of 4250. Given methergine x 1 in OR. H&H at end of case: 9/27/72. Repeat drawn 6 hours post surgery shows stable blood count. Her urine output and VS remained stable through the night  11/11/2017 - POD#1 s/p RLTCS. Pt doing well this morning. Conner removed this AM.    Interval History:     She is doing well this morning. She is tolerating a regular diet without nausea or vomiting. She is not yet voiding spontaneously -conner recently removed. She is ambulating. She has passed flatus, and has not a BM. Vaginal bleeding is mild. She reports fever or chills. Abdominal pain is moderate and controlled with oral medications. She is breastfeeding.     Objective:     Vital Signs (Most Recent):  Temp: 99.1 °F (37.3 °C) (10/11/17 0433)  Pulse: 98 (10/11/17 0433)  Resp: 18 (10/11/17 0433)  BP: 118/72 (10/11/17 0433)  SpO2: 97 % (10/11/17 0433) Vital Signs (24h Range):  Temp:  [97.3 °F (36.3 °C)-99.1 °F (37.3 °C)] 99.1 °F (37.3 °C)  Pulse:  [66-98] 98  Resp:  [18] 18  SpO2:  [90 %-100 %] 97 %  BP: (103-143)/(60-92) 118/72     Weight: 117.9 kg (260 lb)  Body mass index is 41.97 kg/m².      Intake/Output Summary (Last 24 hours) at 10/11/17 0653  Last data filed at 10/11/17 0606   Gross per 24 hour   Intake             4500 ml   Output             7475 ml   Net            -2975 ml       Significant Labs:  Lab Results   Component Value Date    GROUPTRH O POS 10/10/2017    HEPBSAG Negative 03/22/2017    STREPBCULT No Group B Streptococcus isolated 09/18/2017       Recent Labs  Lab 10/11/17  0430   HGB 8.8*   HCT 26.8*       CBC:   Recent Labs  Lab 10/11/17  0430   WBC 9.79   RBC 2.97*   HGB 8.8*   HCT 26.8*   PLT 86*   MCV 90   MCH 29.6   MCHC 32.8       Physical Exam:   Constitutional: She is oriented to person, place, and time. She appears well-developed and well-nourished. No distress.    HENT:   Head: Normocephalic and atraumatic.    Eyes: EOM are  normal.    Neck: Normal range of motion.    Cardiovascular: Normal rate, regular rhythm, normal heart sounds and intact distal pulses.     Pulmonary/Chest: Effort normal and breath sounds normal.        Abdominal: Soft. She exhibits abdominal incision (bandage in place with minimal drainage). She exhibits no distension. There is no tenderness. There is no guarding.             Musculoskeletal: Normal range of motion. She exhibits no edema or tenderness.       Neurological: She is alert and oriented to person, place, and time.    Skin: Skin is warm and dry. She is not diaphoretic.    Psychiatric: She has a normal mood and affect.

## 2017-10-12 PROCEDURE — 25000003 PHARM REV CODE 250: Performed by: STUDENT IN AN ORGANIZED HEALTH CARE EDUCATION/TRAINING PROGRAM

## 2017-10-12 PROCEDURE — 99233 SBSQ HOSP IP/OBS HIGH 50: CPT | Mod: ,,, | Performed by: OBSTETRICS & GYNECOLOGY

## 2017-10-12 PROCEDURE — 11000001 HC ACUTE MED/SURG PRIVATE ROOM

## 2017-10-12 RX ADMIN — HYDROCODONE BITARTRATE AND ACETAMINOPHEN 1 TABLET: 5; 325 TABLET ORAL at 06:10

## 2017-10-12 RX ADMIN — HYDROCODONE BITARTRATE AND ACETAMINOPHEN 1 TABLET: 5; 325 TABLET ORAL at 07:10

## 2017-10-12 RX ADMIN — DOCUSATE SODIUM 200 MG: 100 CAPSULE, LIQUID FILLED ORAL at 09:10

## 2017-10-12 RX ADMIN — IBUPROFEN 600 MG: 600 TABLET, FILM COATED ORAL at 12:10

## 2017-10-12 RX ADMIN — Medication 150 MG: at 09:10

## 2017-10-12 RX ADMIN — IBUPROFEN 600 MG: 600 TABLET, FILM COATED ORAL at 06:10

## 2017-10-12 RX ADMIN — IBUPROFEN 600 MG: 600 TABLET, FILM COATED ORAL at 05:10

## 2017-10-12 RX ADMIN — HYDROCODONE BITARTRATE AND ACETAMINOPHEN 1 TABLET: 10; 325 TABLET ORAL at 10:10

## 2017-10-12 NOTE — ASSESSMENT & PLAN NOTE
- platelets pre delivery were 128. Immediately postpartum, they dropped to 72. Repeat draws show platelets to be stable at 94. Bleeding stable. Will continue to monitor

## 2017-10-12 NOTE — PLAN OF CARE
Problem: Patient Care Overview  Goal: Plan of Care Review  Outcome: Ongoing (interventions implemented as appropriate)  LACTATION NOTE:  Visited mother in room, states baby just finished nursing, father holding sleeping baby in arms.  Mother states breasts are heavier and leaking today, states she hears baby swallowing.  Aware of baby's 24hr output and to nurse often to increase stool output.  Mother will nurse baby on cue till content; will ensure deep latch is achieved and observe for signs of milk transfer; will monitor baby's 24hr output; will call for assistance prn.

## 2017-10-12 NOTE — ASSESSMENT & PLAN NOTE
Postpartum care:  - Patient doing well. Continue routine management and advances.  - Continue PO pain meds. Pain well controlled.  - Encourage ambulation  - Contraception: natural family planning  - PRN Lactation consult

## 2017-10-12 NOTE — ASSESSMENT & PLAN NOTE
-CBL 4250cc. S/p methergine x1 intraop.   -H/H prior to surgery was 12/36. Immediately postpartum, h/H 9/27. Last: 8.8/26.8  -UOP adequate. VSS. Pt asymptomatic.   -continue iron and colace

## 2017-10-12 NOTE — SUBJECTIVE & OBJECTIVE
Hospital course: 10/10/2017 - admitted for RLTCS. Postpartum hemorrhage with QBL of 4250. Given methergine x 1 in OR. H&H at end of case: 9/27/72. Repeat drawn 6 hours post surgery shows stable blood count. Her urine output and VS remained stable through the night  11/11/2017 - POD#1 s/p RLTCS. Pt doing well this morning. Jackson removed this Am.  10/12/2017 - POD#2, meeting all postoperative milestones    Interval History:     She is doing well this morning. She is tolerating a regular diet without nausea or vomiting. She is voiding spontaneously. She is ambulating. She has passed flatus, and has not had a BM. Vaginal bleeding is mild. She reports no fever or chills. Abdominal pain is mild and controlled with oral medications. She is breastfeeding.     Objective:     Vital Signs (Most Recent):  Temp: 99.3 °F (37.4 °C) (10/11/17 2300)  Pulse: 92 (10/11/17 2300)  Resp: 18 (10/11/17 2300)  BP: (!) 122/57 (10/11/17 2300)  SpO2: 97 % (10/11/17 2300) Vital Signs (24h Range):  Temp:  [98.7 °F (37.1 °C)-99.3 °F (37.4 °C)] 99.3 °F (37.4 °C)  Pulse:  [] 92  Resp:  [18] 18  SpO2:  [95 %-97 %] 97 %  BP: (116-122)/(57-70) 122/57     Weight: 117.9 kg (260 lb)  Body mass index is 41.97 kg/m².      Intake/Output Summary (Last 24 hours) at 10/12/17 0628  Last data filed at 10/11/17 1340   Gross per 24 hour   Intake                0 ml   Output             1700 ml   Net            -1700 ml       Significant Labs:  Lab Results   Component Value Date    GROUPTRH O POS 10/10/2017    HEPBSAG Negative 03/22/2017    STREPBCULT No Group B Streptococcus isolated 09/18/2017       Recent Labs  Lab 10/11/17  0430   HGB 8.8*   HCT 26.8*       CBC:     Recent Labs  Lab 10/11/17  0430   WBC 9.79   RBC 2.97*   HGB 8.8*   HCT 26.8*   PLT 86*   MCV 90   MCH 29.6   MCHC 32.8       Physical Exam:   Constitutional: She is oriented to person, place, and time. She appears well-developed and well-nourished. No distress.    HENT:   Head: Normocephalic  and atraumatic.    Eyes: EOM are normal.    Neck: Normal range of motion.     Pulmonary/Chest: Effort normal. No respiratory distress.        Abdominal: Soft. She exhibits abdominal incision (c/d/i). She exhibits no distension. There is no tenderness. There is no guarding.             Musculoskeletal: Normal range of motion. She exhibits no edema or tenderness.       Neurological: She is alert and oriented to person, place, and time.    Skin: Skin is warm and dry. She is not diaphoretic.    Psychiatric: She has a normal mood and affect.

## 2017-10-12 NOTE — PROGRESS NOTES
[facility]  Obstetrics  Postpartum Progress Note    Patient Name: Ann Godfrey  MRN: [id number]  Admission Date: 10/10/2017  Hospital Length of Stay: 2 days  Attending Physician: Yamileth Weber MD  Primary Care Provider: Dunia Schafer MD    Subjective:     Principal Problem:History of  delivery affecting pregnancy    Hospital course: 10/10/2017 - admitted for RLTCS. Postpartum hemorrhage with QBL of 4250. Given methergine x 1 in OR. H&H at end of case: 72. Repeat drawn 6 hours post surgery shows stable blood count. Her urine output and VS remained stable through the night  2017 - POD#1 s/p RLTCS. Pt doing well this morning. Jackson removed this Am.  10/12/2017 - POD#2, meeting all postoperative milestones    Interval History:     She is doing well this morning. She is tolerating a regular diet without nausea or vomiting. She is voiding spontaneously. She is ambulating. She has passed flatus, and has not had a BM. Vaginal bleeding is mild. She reports no fever or chills. Abdominal pain is mild and controlled with oral medications. She is breastfeeding.     Objective:     Vital Signs (Most Recent):  Temp: 99.3 °F (37.4 °C) (10/11/17 2300)  Pulse: 92 (10/11/17 2300)  Resp: 18 (10/11/17 2300)  BP: (!) 122/57 (10/11/17 2300)  SpO2: 97 % (10/11/17 2300) Vital Signs (24h Range):  Temp:  [98.7 °F (37.1 °C)-99.3 °F (37.4 °C)] 99.3 °F (37.4 °C)  Pulse:  [] 92  Resp:  [18] 18  SpO2:  [95 %-97 %] 97 %  BP: (116-122)/(57-70) 122/57     Weight: 117.9 kg (260 lb)  Body mass index is 41.97 kg/m².      Intake/Output Summary (Last 24 hours) at 10/12/17 0628  Last data filed at 10/11/17 1340   Gross per 24 hour   Intake                0 ml   Output             1700 ml   Net            -1700 ml       Significant Labs:  Lab Results   Component Value Date    GROUPTRH O POS 10/10/2017    HEPBSAG Negative 2017    STREPBCULT No Group B Streptococcus isolated 2017       Recent  Labs  Lab 10/11/17  0430   HGB 8.8*   HCT 26.8*       CBC:     Recent Labs  Lab 10/11/17  0430   WBC 9.79   RBC 2.97*   HGB 8.8*   HCT 26.8*   PLT 86*   MCV 90   MCH 29.6   MCHC 32.8       Physical Exam:   Constitutional: She is oriented to person, place, and time. She appears well-developed and well-nourished. No distress.    HENT:   Head: Normocephalic and atraumatic.    Eyes: EOM are normal.    Neck: Normal range of motion.     Pulmonary/Chest: Effort normal. No respiratory distress.        Abdominal: Soft. She exhibits abdominal incision (c/d/i). She exhibits no distension. There is no tenderness. There is no guarding.             Musculoskeletal: Normal range of motion. She exhibits no edema or tenderness.       Neurological: She is alert and oriented to person, place, and time.    Skin: Skin is warm and dry. She is not diaphoretic.    Psychiatric: She has a normal mood and affect.       Assessment/Plan:     37 y.o. female  for:    * History of  delivery affecting pregnancy    Postpartum care:  - Patient doing well. Continue routine management and advances.  - Continue PO pain meds. Pain well controlled.  - Encourage ambulation  - Contraception: natural family planning  - PRN Lactation consult          Postpartum hemorrhage    -CBL 4250cc. S/p methergine x1 intraop.   -H/H prior to surgery was . Immediately postpartum, h/H . Last: ..8  -UOP adequate. VSS. Pt asymptomatic.   -continue iron and colace        Excessive fetal growth affecting management of pregnancy in third trimester    S/p RLTCS        Benign gestational thrombocytopenia in third trimester    - platelets pre delivery were 128. Immediately postpartum, they dropped to 72. Repeat draws show platelets to be stable at 94. Bleeding stable. Will continue to monitor            Disposition: As patient meets milestones, will plan to discharge this afternoon vs tomorrow am.    Hi Hamilton MD  Obstetrics  [facility] [place]

## 2017-10-13 PROCEDURE — 25000003 PHARM REV CODE 250: Performed by: STUDENT IN AN ORGANIZED HEALTH CARE EDUCATION/TRAINING PROGRAM

## 2017-10-13 PROCEDURE — 11000001 HC ACUTE MED/SURG PRIVATE ROOM

## 2017-10-13 PROCEDURE — 99238 HOSP IP/OBS DSCHRG MGMT 30/<: CPT | Mod: ,,, | Performed by: OBSTETRICS & GYNECOLOGY

## 2017-10-13 RX ADMIN — DOCUSATE SODIUM 200 MG: 100 CAPSULE, LIQUID FILLED ORAL at 08:10

## 2017-10-13 RX ADMIN — IBUPROFEN 600 MG: 600 TABLET, FILM COATED ORAL at 12:10

## 2017-10-13 RX ADMIN — HYDROCODONE BITARTRATE AND ACETAMINOPHEN 1 TABLET: 5; 325 TABLET ORAL at 09:10

## 2017-10-13 RX ADMIN — Medication 150 MG: at 08:10

## 2017-10-13 RX ADMIN — HYDROCODONE BITARTRATE AND ACETAMINOPHEN 1 TABLET: 5; 325 TABLET ORAL at 12:10

## 2017-10-13 RX ADMIN — IBUPROFEN 600 MG: 600 TABLET, FILM COATED ORAL at 08:10

## 2017-10-13 RX ADMIN — HYDROCODONE BITARTRATE AND ACETAMINOPHEN 1 TABLET: 5; 325 TABLET ORAL at 02:10

## 2017-10-13 RX ADMIN — HYDROCODONE BITARTRATE AND ACETAMINOPHEN 1 TABLET: 5; 325 TABLET ORAL at 10:10

## 2017-10-13 RX ADMIN — IBUPROFEN 600 MG: 600 TABLET, FILM COATED ORAL at 06:10

## 2017-10-13 NOTE — PROGRESS NOTES
[facility]  Obstetrics  Postpartum Progress Note    Patient Name: Ann Godfrey  MRN: [id number]  Admission Date: 10/10/2017  Hospital Length of Stay: 3 days  Attending Physician: Yamileth Weber MD  Primary Care Provider: Dunia Schafer MD    Subjective:     Principal Problem:History of  delivery affecting pregnancy    Hospital course: 10/10/2017 - admitted for RLTCS. Postpartum hemorrhage with QBL of 4250. Given methergine x 1 in OR. H&H at end of case: 72. Repeat drawn 6 hours post surgery shows stable blood count. Her urine output and VS remained stable through the night  2017 - POD#1 s/p RLTCS. Pt doing well this morning. Jackson removed this Am.  10/12/2017 - POD#2, meeting all postoperative milestones  10/13/2017 - POD#3, meeting all postoperative milestones    Interval History:     She is doing well this morning. She is tolerating a regular diet without nausea or vomiting. She is voiding spontaneously. She is ambulating. She has passed flatus, and has not had a BM. Vaginal bleeding is mild. She reports no fever or chills. Abdominal pain is mild and controlled with oral medications. She is breastfeeding.     Objective:     Vital Signs (Most Recent):  Temp: 98.5 °F (36.9 °C) (10/12/17 2300)  Pulse: 92 (10/12/17 2300)  Resp: 18 (10/12/17 2300)  BP: 120/61 (10/12/17 2300)  SpO2: 97 % (10/12/17 2300) Vital Signs (24h Range):  Temp:  [98.3 °F (36.8 °C)-98.5 °F (36.9 °C)] 98.5 °F (36.9 °C)  Pulse:  [83-92] 92  Resp:  [18] 18  SpO2:  [96 %-97 %] 97 %  BP: (109-127)/(61-74) 120/61     Weight: 117.9 kg (260 lb)  Body mass index is 41.97 kg/m².    No intake or output data in the 24 hours ending 10/13/17 0623    Significant Labs:  Lab Results   Component Value Date    GROUPTRH O POS 10/10/2017    HEPBSAG Negative 2017    STREPBCULT No Group B Streptococcus isolated 2017     No results for input(s): HGB, HCT in the last 48 hours.    CBC:   No results for input(s):  WBC, RBC, HGB, HCT, PLT, MCV, MCH, MCHC in the last 48 hours.    Physical Exam:   Constitutional: She is oriented to person, place, and time. She appears well-developed and well-nourished. No distress.    HENT:   Head: Normocephalic and atraumatic.    Eyes: EOM are normal.    Neck: Normal range of motion.     Pulmonary/Chest: Effort normal. No respiratory distress.        Abdominal: Soft. She exhibits abdominal incision (c/d/i). She exhibits no distension. There is no tenderness. There is no guarding.             Musculoskeletal: Normal range of motion. She exhibits no edema or tenderness.       Neurological: She is alert and oriented to person, place, and time.    Skin: Skin is warm and dry. She is not diaphoretic.    Psychiatric: She has a normal mood and affect.       Assessment/Plan:     37 y.o. female  for:    * History of  delivery affecting pregnancy    Postpartum care:  - Patient doing well. Continue routine management and advances.  - Continue PO pain meds. Pain well controlled.  - Encourage ambulation  - Contraception: natural family planning  - PRN Lactation consult          Postpartum hemorrhage    -CBL 4250cc. S/p methergine x1 intraop.   -H/H prior to surgery was . Immediately postpartum, h/H . Last: ..8  -UOP adequate. VSS. Pt asymptomatic.   -continue iron and colace        Excessive fetal growth affecting management of pregnancy in third trimester    S/p RLTCS        Benign gestational thrombocytopenia in third trimester    - platelets pre delivery were 128. Immediately postpartum, they dropped to 72. Repeat draws show platelets to be stable at 94. Bleeding stable. Will continue to monitor            Disposition: As patient meets milestones, will plan to discharge today.    Hi Hamilton MD  Obstetrics  [facility]

## 2017-10-13 NOTE — DISCHARGE SUMMARY
[facility]  Obstetrics  Discharge Summary      Patient Name: Ann Godfrey  MRN: [id number]  Admission Date: 10/10/2017  Hospital Length of Stay: 4 days  Discharge Date and Time:  10/14/2017  Attending Physician: No att. providers found   Discharging Provider: CORINE Chaidez MD  Primary Care Provider: Dunia Schafer MD    HPI:  Ann Godfrey is a 37 y.o.  female with IUP at 39w0d weeks gestation who is admitted for scheduled  Section secondary to prior c/s x 1.     This IUP is complicated by history of asthma, maternal obesity, and suspected fetal macrosomia w/ EFW >99% (4832g).  Patient denies contractions, denies vaginal bleeding, denies LOF.   Fetal Movement: normal.       Procedure(s) (LRB):  DELIVERY- SECTION (N/A)     Hospital Course:   10/10/2017 - admitted for RLTCS. Postpartum hemorrhage with QBL of 4250. Given methergine x 1 in OR. H&H at end of case: 72. Repeat drawn 6 hours post surgery shows stable blood count. Her urine output and VS remained stable through the night  2017 - POD#1 s/p RLTCS. Pt doing well this morning. [place] removed this Am.  10/12/2017 - POD#2, meeting all postoperative milestones  10/13/2017 - POD#3, meeting all postoperative milestones.  10/14/2017 - POD#4, Continues to meet all postoperative milestones. Anticipate discharge today.        Final Active Diagnoses:    Diagnosis Date Noted POA    PRINCIPAL PROBLEM:  History of  delivery affecting pregnancy [O34.219] 10/10/2017 Yes    Postpartum hemorrhage [O72.1] 10/11/2017 No    Excessive fetal growth affecting management of pregnancy in third trimester [O36.63X0] 2017 Yes    Benign gestational thrombocytopenia in third trimester [O99.113, D69.6] 2017 Yes      Problems Resolved During this Admission:    Diagnosis Date Noted Date Resolved POA        Labs: CBC No results for input(s): WBC, HGB, HCT, PLT in the last 48 hours.    Feeding  Method: breast    Immunizations     Date Immunization Status Dose Route/Site Given by    10/10/17 1632 MMR Incomplete 0.5 mL Subcutaneous/Left deltoid     10/10/17 163 Tdap Incomplete 0.5 mL Intramuscular/Left deltoid           Delivery:    Episiotomy: None   Lacerations: None   Repair suture:     Repair # of packets:     Blood loss (ml): 0     Birth information:  YOB: 2015   Time of birth: 8:16 AM   Sex: female   Delivery type: , Low Transverse   Gestational Age: 39w0d    Delivery Clinician:      Other providers:       Additional  information:  Forceps:    Vacuum:    Breech:    Observed anomalies      Living?:           APGARS  One minute Five minutes Ten minutes   Skin color:         Heart rate:         Grimace:         Muscle tone:         Breathing:         Totals: 6  9        Placenta: Delivered:       appearance    Pending Diagnostic Studies:     None          Discharged Condition: good    Disposition: Home or Self Care    Follow Up:  Follow-up Information     Yamileth Weber MD In 6 weeks.    Specialties:  Obstetrics, Obstetrics and Gynecology  Why:  Postpartum  Contact information:  [address] [place] [id number]  [phone]                 Patient Instructions:     Diet general     Activity as tolerated     Lifting restrictions     Other restrictions (specify):   Order Comments: Pelvic rest until post partum visit. (No sex, no tampons, etc.)     Call MD for:  temperature >100.4     Call MD for:  persistent nausea and vomiting or diarrhea     Call MD for:  severe uncontrolled pain     Call MD for:  difficulty breathing or increased cough     Call MD for:  severe persistent headache     Call MD for:  persistent dizziness, light-headedness, or visual disturbances     Call MD for:  increased confusion or weakness     Call MD for:   Order Comments: Vaginal bleeding through one or more pads each hour for 2 hours     Call MD for:  redness, tenderness, or signs of infection  (pain, swelling, redness, odor or green/yellow discharge around incision site)       Medications:  Discharge Medication List as of 10/14/2017  5:33 PM      START taking these medications    Details   hydrocodone-acetaminophen 5-325mg (NORCO) 5-325 mg per tablet Take 1 tablet by mouth every 4 (four) hours as needed., Starting Wed 10/11/2017, Print      ibuprofen (ADVIL,MOTRIN) 600 MG tablet Take 1 tablet (600 mg total) by mouth every 6 (six) hours., Starting Wed 10/11/2017, Normal         CONTINUE these medications which have NOT CHANGED    Details   PRENATAL VIT #76/IRON,CARB/FA (PNV 29-1 ORAL) Take 1 tablet by mouth once daily., Until Discontinued, Historical Med             CORINE Chaidez MD  Obstetrics  [facility]

## 2017-10-13 NOTE — LACTATION NOTE
10/13/17 1640       Number Scale   Presence of Pain denies   Equipment Type/Education   Pump Type Symphony   Breast Pump Type double electric, hospital grade   Breast Pump Flange Type hard   Breast Pump Flange Size 24 mm   Breast Pumping Bilateral Breasts:   Pumping Frequency (times) (after feeding for extra stimulation)   Lactation Referrals   Lactation Consult Breastfeeding assessment   Lactation Interventions   Attachment Promotion breastfeeding assistance provided   Breastfeeding Assistance assisted with positioning;electric breast pump used;feeding cue recognition promoted;feeding on demand promoted   Maternal Breastfeeding Support diary/feeding log utilized;encouragement offered;infant-mother separation minimized;lactation counseling provided;maternal hydration promoted;maternal nutrition promoted;maternal rest encouraged   Latch Promotion positioning assisted   pump after nursing for extra stimulation and give baby any ebm obtained.

## 2017-10-13 NOTE — SUBJECTIVE & OBJECTIVE
Hospital course: 10/10/2017 - admitted for RLTCS. Postpartum hemorrhage with QBL of 4250. Given methergine x 1 in OR. H&H at end of case: 9/27/72. Repeat drawn 6 hours post surgery shows stable blood count. Her urine output and VS remained stable through the night  11/11/2017 - POD#1 s/p RLTCS. Pt doing well this morning. [place] removed this Am.  10/12/2017 - POD#2, meeting all postoperative milestones  10/13/2017 - POD#3, meeting all postoperative milestones    Interval History:     She is doing well this morning. She is tolerating a regular diet without nausea or vomiting. She is voiding spontaneously. She is ambulating. She has passed flatus, and has not had a BM. Vaginal bleeding is mild. She reports no fever or chills. Abdominal pain is mild and controlled with oral medications. She is breastfeeding.     Objective:     Vital Signs (Most Recent):  Temp: 98.5 °F (36.9 °C) (10/12/17 2300)  Pulse: 92 (10/12/17 2300)  Resp: 18 (10/12/17 2300)  BP: 120/61 (10/12/17 2300)  SpO2: 97 % (10/12/17 2300) Vital Signs (24h Range):  Temp:  [98.3 °F (36.8 °C)-98.5 °F (36.9 °C)] 98.5 °F (36.9 °C)  Pulse:  [83-92] 92  Resp:  [18] 18  SpO2:  [96 %-97 %] 97 %  BP: (109-127)/(61-74) 120/61     Weight: 117.9 kg (260 lb)  Body mass index is 41.97 kg/m².    No intake or output data in the 24 hours ending 10/13/17 0623    Significant Labs:  Lab Results   Component Value Date    GROUPTRH O POS 10/10/2017    HEPBSAG Negative 03/22/2017    STREPBCULT No Group B Streptococcus isolated 09/18/2017     No results for input(s): HGB, HCT in the last 48 hours.    CBC:   No results for input(s): WBC, RBC, HGB, HCT, PLT, MCV, MCH, MCHC in the last 48 hours.    Physical Exam:   Constitutional: She is oriented to person, place, and time. She appears well-developed and well-nourished. No distress.    HENT:   Head: Normocephalic and atraumatic.    Eyes: EOM are normal.    Neck: Normal range of motion.     Pulmonary/Chest: Effort normal. No respiratory  distress.        Abdominal: Soft. She exhibits abdominal incision (c/d/i). She exhibits no distension. There is no tenderness. There is no guarding.             Musculoskeletal: Normal range of motion. She exhibits no edema or tenderness.       Neurological: She is alert and oriented to person, place, and time.    Skin: Skin is warm and dry. She is not diaphoretic.    Psychiatric: She has a normal mood and affect.

## 2017-10-13 NOTE — PLAN OF CARE
Problem: Breastfeeding (Adult,Obstetrics,Pediatric)  Goal: Signs and Symptoms of Listed Potential Problems Will be Absent, Minimized or Managed (Breastfeeding)  Signs and symptoms of listed potential problems will be absent, minimized or managed by discharge/transition of care (reference Breastfeeding (Adult,Obstetrics,Pediatric) CPG).    10/13/17 1127   Breastfeeding   Problems Assessed (Breastfeeding) all   Problems Present (Breastfeeding) none   Follow discharge education

## 2017-10-13 NOTE — DISCHARGE INSTRUCTIONS
Breastfeeding Discharge Instructions       Feed the baby at the earliest sign of hunger or comfort  o Hands to mouth, sucking motions  o Rooting or searching for something to suck on  o Dont wait for crying - it is a sign of distress     The feedings may be 8-12 times per 24hrs and will not follow a schedule   Avoid pacifiers and bottles for the first 4 weeks   Alternate the breast you start the feeding with, or start with the breast that feels the fullest   Switch breasts when the baby takes himself off the breast or falls asleep   Keep offering breasts until the baby looks full, no longer gives hunger signs, and stays asleep when placed on his back in the crib   If the baby is sleepy and wont wake for a feeding, put the baby skin-to-skin dressed in a diaper against the mothers bare chest   Sleep near your baby   The baby should be positioned and latched on to the breast correctly  o Chest-to-chest, chin in the breast  o Babys lips are flipped outward  o Babys mouth is stretched open wide like a shout  o Babys sucking should feel like tugging to the mother  - The baby should be drinking at the breast:  o You should hear swallowing or gulping throughout the feeding  o You should see milk on the babys lips when he comes off the breast  o Your breasts should be softer when the baby is finished feeding  o The baby should look relaxed at the end of feedings  o After the 4th day and your milk is in:  o The babys poop should turn bright yellow and be loose, watery, and seedy  o The baby should have at least 3-4 poops the size of the palm of your hand per day  o The baby should have at least 5-6 wet diapers per day  o The urine should be light yellow in color  You should drink when you are thirsty and eat a healthy diet when you are    hungry.     Take naps to get the rest you need.   Take medications and/or drink alcohol only with permission of your obstetrician    or the babys pediatrician.  You can  also call the Infant Risk Center,   ([phone]), Monday-Friday, 8am-5pm Central time, to get the most   up-to-date evidence-based information on the use of medications during   pregnancy and breastfeeding.      The baby should be examined by a pediatrician at 3-5 days of age.  Once your   milk comes in, the baby should be gaining at least ½ - 1oz each day and should be back to birthweight no later than 10-14 days of age.          Community Resources    [facility] Breastfeeding Warmline: [phone]   Local [facility] clinics: provide incentives and breastpumps to eligible mothers  [employer] (LLLI):  mother-to-mother support group website        www.[employer].Framehawk  Local La Leche League mother-to-mother support groups:        www.Cloze        La Leche League [facility]   Dr. Pablo Mclaughlin website for latch videos and general information:        www.breastfeedinginc.ca  Infant Risk Center is a call center that provides information about the safety of taking medications while breastfeeding.  Call [phone], M-F, 8am-5pm, [place].  International Lactation Consultant Association provides resources for assistance:        www.ilca.org  [place] [employer] Breastfeeding [employer] provides informationand resources for parents  and the community    http://[facility].org     Ronna Michel is a mom-to-mom support group:                             www.nolanesting.HistoryFile//breastfeedng-support/  Partners for Healthy Babies:  5-764-895-BABY(3964)  Cafe [employer]ait: a breastfeeding support group for women of color, 137.613.5571  Primary Engorgement    If the milk is flowing, use wet or dry heat applied to the breasts for approximately 10min prior to each feeding as a comfort measure to facilitate the milk ejection reflex    Follow heat treatment with breast massage to soften hard/lumpy areas of the breast    Use unrestricted, frequent, effective feedings.      Wake baby to feed if necessary    Avoid  pacifier and bottle feedings    Hand express or pump breasts to the point of comfort prn    Use cold treatments in the form of ice packs/gel packs/ frozen vegetables wrapped in a soft thin cloth and applied to the breasts for approximately 20min after each feeding until engorgement is resolved    Wear comfortable, supportive bra    Take pain medicine prn    Use anti-inflammatory medications if prescribed by physician    Other:

## 2017-10-14 VITALS
SYSTOLIC BLOOD PRESSURE: 129 MMHG | RESPIRATION RATE: 18 BRPM | TEMPERATURE: 98 F | WEIGHT: 260 LBS | OXYGEN SATURATION: 97 % | BODY MASS INDEX: 41.78 KG/M2 | HEIGHT: 66 IN | HEART RATE: 77 BPM | DIASTOLIC BLOOD PRESSURE: 80 MMHG

## 2017-10-14 PROCEDURE — 25000003 PHARM REV CODE 250: Performed by: STUDENT IN AN ORGANIZED HEALTH CARE EDUCATION/TRAINING PROGRAM

## 2017-10-14 PROCEDURE — 99024 POSTOP FOLLOW-UP VISIT: CPT | Mod: ,,, | Performed by: OBSTETRICS & GYNECOLOGY

## 2017-10-14 RX ADMIN — Medication 150 MG: at 09:10

## 2017-10-14 RX ADMIN — DOCUSATE SODIUM 200 MG: 100 CAPSULE, LIQUID FILLED ORAL at 09:10

## 2017-10-14 RX ADMIN — IBUPROFEN 600 MG: 600 TABLET, FILM COATED ORAL at 05:10

## 2017-10-14 RX ADMIN — IBUPROFEN 600 MG: 600 TABLET, FILM COATED ORAL at 09:10

## 2017-10-14 RX ADMIN — HYDROCODONE BITARTRATE AND ACETAMINOPHEN 1 TABLET: 10; 325 TABLET ORAL at 02:10

## 2017-10-14 RX ADMIN — IBUPROFEN 600 MG: 600 TABLET, FILM COATED ORAL at 02:10

## 2017-10-14 RX ADMIN — HYDROCODONE BITARTRATE AND ACETAMINOPHEN 1 TABLET: 5; 325 TABLET ORAL at 06:10

## 2017-10-14 NOTE — PROGRESS NOTES
[facility]  Obstetrics  Postpartum Progress Note    Patient Name: Ann Godfrey  MRN: [id number]  Admission Date: 10/10/2017  Hospital Length of Stay: 4 days  Attending Physician: Yamileth Weber MD  Primary Care Provider: Dunia Schafer MD    Subjective:     Principal Problem:History of  delivery affecting pregnancy    Hospital course: 10/10/2017 - admitted for RLTCS. Postpartum hemorrhage with QBL of 4250. Given methergine x 1 in OR. H&H at end of case: 72. Repeat drawn 6 hours post surgery shows stable blood count. Her urine output and VS remained stable through the night  2017 - POD#1 s/p RLTCS. Pt doing well this morning. [place] removed this Am.  10/12/2017 - POD#2, meeting all postoperative milestones  10/13/2017 - POD#3, meeting all postoperative milestones.  10/14/2017 - POD#4, Continues to meet all postoperative milestones. Anticipate discharge today.    Interval History:     She is doing well this morning. She is tolerating a regular diet without nausea or vomiting. She is voiding spontaneously. She is ambulating. She has passed flatus, and has had a BM. Vaginal bleeding is mild. She reports no fever or chills. Abdominal pain is mild and controlled with oral medications. She is breastfeeding.     Objective:     Vital Signs (Most Recent):  Temp: 97.9 °F (36.6 °C) (10/14/17 0005)  Pulse: 88 (10/14/17 0005)  Resp: 18 (10/14/17 0005)  BP: 137/79 (10/14/17 0005)  SpO2: 98 % (10/14/17 0005) Vital Signs (24h Range):  Temp:  [97.9 °F (36.6 °C)-98 °F (36.7 °C)] 97.9 °F (36.6 °C)  Pulse:  [88-89] 88  Resp:  [18] 18  SpO2:  [98 %] 98 %  BP: (126-137)/(72-79) 137/79     Weight: 117.9 kg (260 lb)  Body mass index is 41.97 kg/m².    No intake or output data in the 24 hours ending 10/14/17 0814    Significant Labs:  Lab Results   Component Value Date    GROUPTRH O POS 10/10/2017    HEPBSAG Negative 2017    STREPBCULT No Group B Streptococcus isolated 2017     No  results for input(s): HGB, HCT in the last 48 hours.    CBC:   No results for input(s): WBC, RBC, HGB, HCT, PLT, MCV, MCH, MCHC in the last 48 hours.    Physical Exam:   Constitutional: She is oriented to person, place, and time. She appears well-developed and well-nourished. No distress.    HENT:   Head: Normocephalic and atraumatic.    Eyes: EOM are normal.    Neck: Normal range of motion.     Pulmonary/Chest: Effort normal. No respiratory distress.        Abdominal: Soft. She exhibits abdominal incision (c/d/i). She exhibits no distension. There is no tenderness. There is no guarding.             Musculoskeletal: Normal range of motion. She exhibits no edema or tenderness.       Neurological: She is alert and oriented to person, place, and time.    Skin: Skin is warm and dry. She is not diaphoretic.    Psychiatric: She has a normal mood and affect.       Assessment/Plan:     37 y.o. female  for:    * History of  delivery affecting pregnancy    Postpartum care:  - Patient doing well. Continue routine management and advances.  - Continue PO pain meds. Pain well controlled.  - Encourage ambulation  - Contraception: natural family planning  - PRN Lactation consult          Postpartum hemorrhage    -CBL 4250cc. S/p methergine x1 intraop.   -H/H prior to surgery was . Immediately postpartum, h/H . Last: ./.8  -UOP adequate. VSS. Pt asymptomatic.   -continue iron and colace        Excessive fetal growth affecting management of pregnancy in third trimester    S/p RLTCS        Benign gestational thrombocytopenia in third trimester    - platelets pre delivery were 128. Immediately postpartum, they dropped to 72. Repeat draws show platelets to be stable at 94. Bleeding stable. Will continue to monitor            Disposition: As patient meets milestones, will plan to discharge today.    Hi Hamilton MD  Obstetrics  [facility]

## 2017-10-14 NOTE — LACTATION NOTE
Lactation rounds. Patient reports she has been able to pump up to 20 mL after nursing, but with most recent feedings, baby has been draining breast and she has not collected much after breastfeeding. Feels baby is latching well and swallowing throughout feedings. States milk is coming in. Has no questions or concerns at this time with regards to feedings. Reports lactation discharge teaching was reviewed with NINI yesterday. Has Medela breast pump at home for use if needed. Encouraged to call lactation warmline from home with any questions/concerns. Contact number on whiteboard to call lactation for assistance as needed today, prior to discharge. Voices understanding.

## 2017-10-14 NOTE — SUBJECTIVE & OBJECTIVE
Hospital course: 10/10/2017 - admitted for RLTCS. Postpartum hemorrhage with QBL of 4250. Given methergine x 1 in OR. H&H at end of case: 9/27/72. Repeat drawn 6 hours post surgery shows stable blood count. Her urine output and VS remained stable through the night  11/11/2017 - POD#1 s/p RLTCS. Pt doing well this morning. [place] removed this Am.  10/12/2017 - POD#2, meeting all postoperative milestones  10/13/2017 - POD#3, meeting all postoperative milestones.  10/14/2017 - POD#4, Continues to meet all postoperative milestones. Anticipate discharge today.    Interval History:     She is doing well this morning. She is tolerating a regular diet without nausea or vomiting. She is voiding spontaneously. She is ambulating. She has passed flatus, and has had a BM. Vaginal bleeding is mild. She reports no fever or chills. Abdominal pain is mild and controlled with oral medications. She is breastfeeding.     Objective:     Vital Signs (Most Recent):  Temp: 97.9 °F (36.6 °C) (10/14/17 0005)  Pulse: 88 (10/14/17 0005)  Resp: 18 (10/14/17 0005)  BP: 137/79 (10/14/17 0005)  SpO2: 98 % (10/14/17 0005) Vital Signs (24h Range):  Temp:  [97.9 °F (36.6 °C)-98 °F (36.7 °C)] 97.9 °F (36.6 °C)  Pulse:  [88-89] 88  Resp:  [18] 18  SpO2:  [98 %] 98 %  BP: (126-137)/(72-79) 137/79     Weight: 117.9 kg (260 lb)  Body mass index is 41.97 kg/m².    No intake or output data in the 24 hours ending 10/14/17 0814    Significant Labs:  Lab Results   Component Value Date    GROUPTRH O POS 10/10/2017    HEPBSAG Negative 03/22/2017    STREPBCULT No Group B Streptococcus isolated 09/18/2017     No results for input(s): HGB, HCT in the last 48 hours.    CBC:   No results for input(s): WBC, RBC, HGB, HCT, PLT, MCV, MCH, MCHC in the last 48 hours.    Physical Exam:   Constitutional: She is oriented to person, place, and time. She appears well-developed and well-nourished. No distress.    HENT:   Head: Normocephalic and atraumatic.    Eyes: EOM are  normal.    Neck: Normal range of motion.     Pulmonary/Chest: Effort normal. No respiratory distress.        Abdominal: Soft. She exhibits abdominal incision (c/d/i). She exhibits no distension. There is no tenderness. There is no guarding.             Musculoskeletal: Normal range of motion. She exhibits no edema or tenderness.       Neurological: She is alert and oriented to person, place, and time.    Skin: Skin is warm and dry. She is not diaphoretic.    Psychiatric: She has a normal mood and affect.

## 2017-10-14 NOTE — PLAN OF CARE
Problem: Patient Care Overview  Goal: Plan of Care Review  Outcome: Outcome(s) achieved Date Met: 10/14/17  Pt in no apparent distress. Vs stable. Pt verbalized understanding of pain management. Discharged to room in with infant who is receiving phototherapy.

## 2017-11-06 ENCOUNTER — PATIENT MESSAGE (OUTPATIENT)
Dept: OBSTETRICS AND GYNECOLOGY | Facility: CLINIC | Age: 37
End: 2017-11-06

## 2017-11-21 ENCOUNTER — POSTPARTUM VISIT (OUTPATIENT)
Dept: OBSTETRICS AND GYNECOLOGY | Facility: CLINIC | Age: 37
End: 2017-11-21
Payer: MEDICAID

## 2017-11-21 VITALS
HEIGHT: 66 IN | SYSTOLIC BLOOD PRESSURE: 122 MMHG | DIASTOLIC BLOOD PRESSURE: 78 MMHG | BODY MASS INDEX: 38.83 KG/M2 | WEIGHT: 241.63 LBS

## 2017-11-21 PROBLEM — D69.6 BENIGN GESTATIONAL THROMBOCYTOPENIA IN THIRD TRIMESTER: Status: RESOLVED | Noted: 2017-08-14 | Resolved: 2017-11-21

## 2017-11-21 PROBLEM — O99.210 OBESITY COMPLICATING PREGNANCY: Status: RESOLVED | Noted: 2017-04-19 | Resolved: 2017-11-21

## 2017-11-21 PROBLEM — O09.90 PREGNANCY, SUPERVISION, HIGH-RISK: Status: RESOLVED | Noted: 2017-04-19 | Resolved: 2017-11-21

## 2017-11-21 PROBLEM — O34.219 HISTORY OF CESAREAN DELIVERY AFFECTING PREGNANCY: Status: RESOLVED | Noted: 2017-10-10 | Resolved: 2017-11-21

## 2017-11-21 PROBLEM — O34.219 HISTORY OF CESAREAN SECTION COMPLICATING PREGNANCY: Status: RESOLVED | Noted: 2017-05-17 | Resolved: 2017-11-21

## 2017-11-21 PROBLEM — O99.113 BENIGN GESTATIONAL THROMBOCYTOPENIA IN THIRD TRIMESTER: Status: RESOLVED | Noted: 2017-08-14 | Resolved: 2017-11-21

## 2017-11-21 PROBLEM — O36.63X0 EXCESSIVE FETAL GROWTH AFFECTING MANAGEMENT OF PREGNANCY IN THIRD TRIMESTER: Status: RESOLVED | Noted: 2017-09-14 | Resolved: 2017-11-21

## 2017-11-21 PROCEDURE — 99999 PR PBB SHADOW E&M-EST. PATIENT-LVL II: CPT | Mod: PBBFAC,,, | Performed by: STUDENT IN AN ORGANIZED HEALTH CARE EDUCATION/TRAINING PROGRAM

## 2017-11-21 PROCEDURE — 99212 OFFICE O/P EST SF 10 MIN: CPT | Mod: PBBFAC,PO | Performed by: STUDENT IN AN ORGANIZED HEALTH CARE EDUCATION/TRAINING PROGRAM

## 2017-11-21 NOTE — PROGRESS NOTES
Ann Godfrey is a 37 y.o. female  presents for a postpartum visit.  She is status post LTCS 6 weeks ago.  Her hospitalization was not complicated.  She is breastfeeding.  She desires no method for contraception.  She denies postpartum depression.    Her last pap was 3/22/17    Past Medical History:   Diagnosis Date    Arthritis     spinal    Asthma     was on advair , resolved    Benign tumor of spinal cord     Depression     GERD (gastroesophageal reflux disease)      Past Surgical History:   Procedure Laterality Date    APPENDECTOMY       SECTION      posteriorlaminectomy      For benign spinal tumor;      Review of patient's allergies indicates:  No Known Allergies    Current Outpatient Prescriptions:     PRENATAL VIT #76/IRON,CARB/FA (PNV 29-1 ORAL), Take 1 tablet by mouth once daily., Disp: , Rfl:     hydrocodone-acetaminophen 5-325mg (NORCO) 5-325 mg per tablet, Take 1 tablet by mouth every 4 (four) hours as needed., Disp: 30 tablet, Rfl: 0    ibuprofen (ADVIL,MOTRIN) 600 MG tablet, Take 1 tablet (600 mg total) by mouth every 6 (six) hours., Disp: 45 tablet, Rfl: 2      Vitals:    17 1142   BP: 122/78       GENERAL: healthy  ABDOMEN: no masses, hepatosplenomegaly, no hernias. LTCS scar c/d/i. Healing well. No signs of infection.   EXTERNAL GENITALIA POSTPARTUM: normal, well-healed, without lesions or masses   VAGINA POSTPARTUM: normal, well-healed, physiologic discharge, without lesions   CERVIX POSTPARTUM: normal, well-healed, without lesions   UTERUS POSTPARTUM: normal size, well involuted, firm, non-tender, ADNEXA POSTPARTUM: no masses palpable and nontender    Assessment:  Normal postpartum exam    Plan:  Routine follow up.  - no birth control wanted  - RTC in 1 year for annual or as needed

## 2017-11-22 ENCOUNTER — PATIENT MESSAGE (OUTPATIENT)
Dept: INTERNAL MEDICINE | Facility: CLINIC | Age: 37
End: 2017-11-22

## 2017-11-29 NOTE — TELEPHONE ENCOUNTER
Tried calling no answer to inform you that the appointment that was booked on line was in error and have been canceled for that time slot. You last saw Dr. Schafer in 2013 and now you are considered a new pt that has to be booked in a new pt slot which is 8:00 and 8:40. Also can you call our office to update insurance information and demographics as well . I will change the appointment time on that day to 8:40 am and if not a good and time please feel free to e-mail my back .  Thanks for choosing [facility]

## 2018-02-27 ENCOUNTER — TELEPHONE (OUTPATIENT)
Dept: INTERNAL MEDICINE | Facility: CLINIC | Age: 38
End: 2018-02-27

## 2018-05-17 ENCOUNTER — PATIENT MESSAGE (OUTPATIENT)
Dept: NEUROSURGERY | Facility: CLINIC | Age: 38
End: 2018-05-17

## 2018-07-09 ENCOUNTER — HOSPITAL ENCOUNTER (EMERGENCY)
Facility: OTHER | Age: 38
Discharge: HOME | End: 2018-07-09
Attending: EMERGENCY MEDICINE
Payer: MEDICAID

## 2018-07-09 ENCOUNTER — PATIENT MESSAGE (OUTPATIENT)
Dept: PRIMARY CARE CLINIC | Facility: CLINIC | Age: 38
End: 2018-07-09

## 2018-07-09 ENCOUNTER — PATIENT MESSAGE (OUTPATIENT)
Dept: OBSTETRICS AND GYNECOLOGY | Facility: CLINIC | Age: 38
End: 2018-07-09

## 2018-07-09 VITALS
RESPIRATION RATE: 18 BRPM | DIASTOLIC BLOOD PRESSURE: 85 MMHG | HEART RATE: 69 BPM | SYSTOLIC BLOOD PRESSURE: 125 MMHG | OXYGEN SATURATION: 99 % | WEIGHT: 240 LBS | TEMPERATURE: 98 F | BODY MASS INDEX: 38.57 KG/M2 | HEIGHT: 66 IN

## 2018-07-09 DIAGNOSIS — R10.2 PELVIC PAIN: ICD-10-CM

## 2018-07-09 DIAGNOSIS — N83.202 HEMORRHAGIC CYST OF LEFT OVARY: Primary | ICD-10-CM

## 2018-07-09 DIAGNOSIS — M54.42 ACUTE LEFT-SIDED LOW BACK PAIN WITH LEFT-SIDED SCIATICA: ICD-10-CM

## 2018-07-09 LAB
B-HCG UR QL: NEGATIVE
BACTERIA GENITAL QL WET PREP: ABNORMAL
BILIRUB UR QL STRIP: NEGATIVE
C TRACH DNA SPEC QL NAA+PROBE: NOT DETECTED
CLARITY UR: CLEAR
CLUE CELLS VAG QL WET PREP: ABNORMAL
COLOR UR: YELLOW
CTP QC/QA: YES
FILAMENT FUNGI VAG WET PREP-#/AREA: ABNORMAL
GLUCOSE UR QL STRIP: NEGATIVE
HGB UR QL STRIP: NEGATIVE
KETONES UR QL STRIP: NEGATIVE
LEUKOCYTE ESTERASE UR QL STRIP: NEGATIVE
N GONORRHOEA DNA SPEC QL NAA+PROBE: NOT DETECTED
NITRITE UR QL STRIP: NEGATIVE
PH UR STRIP: 6 [PH] (ref 5–8)
PROT UR QL STRIP: NEGATIVE
SP GR UR STRIP: 1.01 (ref 1–1.03)
SPECIMEN SOURCE: ABNORMAL
T VAGINALIS GENITAL QL WET PREP: ABNORMAL
URN SPEC COLLECT METH UR: NORMAL
UROBILINOGEN UR STRIP-ACNC: NEGATIVE EU/DL
WBC #/AREA VAG WET PREP: ABNORMAL
YEAST GENITAL QL WET PREP: ABNORMAL

## 2018-07-09 PROCEDURE — 99284 EMERGENCY DEPT VISIT MOD MDM: CPT | Mod: 25

## 2018-07-09 PROCEDURE — 25000003 PHARM REV CODE 250: Performed by: PHYSICIAN ASSISTANT

## 2018-07-09 PROCEDURE — 81025 URINE PREGNANCY TEST: CPT | Performed by: EMERGENCY MEDICINE

## 2018-07-09 PROCEDURE — 87210 SMEAR WET MOUNT SALINE/INK: CPT

## 2018-07-09 PROCEDURE — 81003 URINALYSIS AUTO W/O SCOPE: CPT

## 2018-07-09 PROCEDURE — 63600175 PHARM REV CODE 636 W HCPCS: Performed by: PHYSICIAN ASSISTANT

## 2018-07-09 PROCEDURE — 96372 THER/PROPH/DIAG INJ SC/IM: CPT

## 2018-07-09 PROCEDURE — 87491 CHLMYD TRACH DNA AMP PROBE: CPT

## 2018-07-09 RX ORDER — NAPROXEN 500 MG/1
500 TABLET ORAL 2 TIMES DAILY WITH MEALS
Qty: 20 TABLET | Refills: 0 | Status: SHIPPED | OUTPATIENT
Start: 2018-07-09 | End: 2019-01-28

## 2018-07-09 RX ORDER — KETOROLAC TROMETHAMINE 30 MG/ML
30 INJECTION, SOLUTION INTRAMUSCULAR; INTRAVENOUS
Status: COMPLETED | OUTPATIENT
Start: 2018-07-09 | End: 2018-07-09

## 2018-07-09 RX ORDER — METHOCARBAMOL 500 MG/1
1000 TABLET, FILM COATED ORAL 3 TIMES DAILY
Qty: 18 TABLET | Refills: 0 | Status: SHIPPED | OUTPATIENT
Start: 2018-07-09 | End: 2018-07-12

## 2018-07-09 RX ORDER — METHOCARBAMOL 500 MG/1
1000 TABLET, FILM COATED ORAL
Status: COMPLETED | OUTPATIENT
Start: 2018-07-09 | End: 2018-07-09

## 2018-07-09 RX ADMIN — KETOROLAC TROMETHAMINE 30 MG: 30 INJECTION, SOLUTION INTRAMUSCULAR at 11:07

## 2018-07-09 RX ADMIN — METHOCARBAMOL 1000 MG: 500 TABLET ORAL at 11:07

## 2018-07-09 NOTE — ED TRIAGE NOTES
Pt reports several days of L side low back and pelvic pain with radiation down LLE since last night. LNBM yesterday. +nausea, denies vomiting.

## 2018-07-09 NOTE — ED PROVIDER NOTES
Encounter Date: 2018       History     Chief Complaint   Patient presents with    Back Pain     Pt c/o left sided back pain that radiates down left leg. Pt also c/o left sided pelvic pain with clear discharge since friday.      A 38-year-old female who is obese with benign tumor the spinal cord (C2-C3), GERD, asthma, arthritis, chronic pain and depression presents to the emergency department with complaints of left the lower back pain radiating down her leg.  She states that her symptoms started on Friday and it gradually worsened.  She also complains of left sided pelvic pain and clear vaginal discharge. She denies fever, chills, loss of bowel bladder function or saddle paresthesias.  She does report chronic numbness and weakness but states that this has been present secondary to the spinal cord tumor.  She states in  she had surgery to remove the tumor.  She states she has been undergoing radiation since to keep it from enlarging.  She reports pain at a 6/10 worse with certain positions and movement.  No current treatment for her pain.      The history is provided by the patient and the spouse.     Review of patient's allergies indicates:  No Known Allergies  Past Medical History:   Diagnosis Date    Arthritis     spinal    Asthma     was on advair , resolved    Benign tumor of spinal cord     Depression     Ectopic pregnancy     GERD (gastroesophageal reflux disease)      Past Surgical History:   Procedure Laterality Date    APPENDECTOMY       SECTION      posteriorlaminectomy      For benign spinal tumor;      Family History   Problem Relation Age of Onset    Liver disease Father     Breast cancer Neg Hx     Colon cancer Neg Hx     Ovarian cancer Neg Hx     Stroke Neg Hx     Diabetes Neg Hx     Hypertension Neg Hx      Social History   Substance Use Topics    Smoking status: Former Smoker     Packs/day: 1.00     Years: 13.00     Types: Cigarettes     Quit date: 10/20/2012     Smokeless tobacco: Former User    Alcohol use No     Review of Systems   Constitutional: Negative for chills and fever.   HENT: Negative for sore throat.    Respiratory: Negative for shortness of breath.    Cardiovascular: Negative for chest pain.   Gastrointestinal: Negative for nausea and vomiting.   Genitourinary: Positive for pelvic pain and vaginal discharge. Negative for difficulty urinating, dysuria and vaginal bleeding.   Musculoskeletal: Positive for arthralgias and back pain. Negative for joint swelling, neck pain and neck stiffness.   Skin: Negative for rash.   Neurological: Positive for weakness and numbness.        Numbness and weakness which patient reports is chronic due to tumor at C2-3.    Hematological: Does not bruise/bleed easily.       Physical Exam     Initial Vitals [07/09/18 0926]   BP Pulse Resp Temp SpO2   131/87 74 18 97.9 °F (36.6 °C) 97 %      MAP       --         Physical Exam    Nursing note and vitals reviewed.  Constitutional: Vital signs are normal. She appears well-developed and well-nourished. She is not diaphoretic. She is Obese .  Non-toxic appearance. No distress.   HENT:   Head: Normocephalic and atraumatic.   Right Ear: External ear normal.   Left Ear: External ear normal.   Nose: Nose normal.   Eyes: Conjunctivae, EOM and lids are normal. Pupils are equal, round, and reactive to light. No scleral icterus.   Neck: Normal range of motion and phonation normal. Neck supple. No spinous process tenderness and no muscular tenderness present. Normal range of motion present. No neck rigidity.   Cardiovascular: Normal rate, regular rhythm, normal heart sounds, intact distal pulses and normal pulses. Exam reveals no gallop, no friction rub and no decreased pulses.    No murmur heard.  Pulses:       Radial pulses are 2+ on the right side, and 2+ on the left side.        Dorsalis pedis pulses are 2+ on the right side, and 2+ on the left side.   Pulmonary/Chest: Effort normal and  breath sounds normal. No respiratory distress. She has no decreased breath sounds. She has no wheezes. She has no rhonchi. She has no rales.   Abdominal: Soft. Normal appearance and bowel sounds are normal. There is no tenderness. There is no rigidity, no rebound, no guarding, no CVA tenderness, no tenderness at McBurney's point and negative Butcher's sign.   Genitourinary: Uterus normal. Pelvic exam was performed with patient supine. There is no rash, tenderness or lesion on the right labia. There is no rash, tenderness or lesion on the left labia. Uterus is not deviated, not enlarged and not tender. Cervix exhibits no motion tenderness, no discharge and no friability. Right adnexum displays no tenderness. Left adnexum displays tenderness. No bleeding in the vagina. Vaginal discharge found.   Genitourinary Comments: White mucous like vaginal discharge with fishy odor   Musculoskeletal: Normal range of motion.   No obvious deformities, moving all extremities, normal gait  No midline tenderness palpation or step-offs of the cervical, thoracic or lumbar spine.  No sensory deficits.  Full range of motion of the spine.  A full range of motion bilateral lower extremities. Strength 5/5.  Capillary refill less than 3 sec.  Intact distal pulses.  No erythema, warmth, edema or ecchymosis.   Neurological: She is alert and oriented to person, place, and time. She has normal strength. She displays no atrophy. No sensory deficit. She exhibits normal muscle tone. Coordination and gait normal.   Skin: Skin is warm, dry and intact. Capillary refill takes less than 2 seconds. No abrasion, no bruising, no ecchymosis, no laceration, no lesion and no rash noted. No erythema.   Psychiatric: She has a normal mood and affect. Her speech is normal and behavior is normal. Judgment normal. Cognition and memory are normal.         ED Course   Procedures  Labs Reviewed   VAGINAL SCREEN - Abnormal; Notable for the following:        Result Value     WBC - Vaginal Screen Rare (*)     Bacteria - Vaginal Screen Few (*)     All other components within normal limits   C. TRACHOMATIS/N. GONORRHOEAE BY AMP DNA   URINALYSIS, REFLEX TO URINE CULTURE    Narrative:     Preferred Collection Type->Urine, Clean Catch   POCT URINE PREGNANCY          Imaging Results          US Pelvis Comp with Transvag NON-OB (xpd) (Final result)  Result time 07/09/18 13:51:36   Procedure changed from US Pelvis Complete Non OB     Final result by Bautista Blackburn DO (07/09/18 13:51:36)                 Impression:      Nonspecific fluid within the endocervical canal, clinical correlation advised.    No focal uterine lesion.    Endometrium measuring 11 mm in thickness    2.4 cm heterogeneous hypoechoic focus within the left ovary which may represent a hemorrhagic cyst though indeterminate.  In addition there is a questioned 4 cm right ovarian anechoic focus suggestive for a cyst.    Clinical correlation and follow-up advised      Electronically signed by: Bautista Blackburn DO  Date:    07/09/2018  Time:    13:51             Narrative:    EXAMINATION:  US PELVIS COMP WITH TRANSVAG NON-OB (XPD)    CLINICAL HISTORY:  left adnexal pain;    TECHNIQUE:  Trans abdominal and transvaginal ultrasound of the pelvis.    COMPARISON:  None.    FINDINGS:  The uterus is measures 9.7 cm in length by 5.6 x 5.7 cm in transverse dimension.  The endometrium measures 11 mm in thickness.    Study is limited due to patient discomfort    Right ovary normal in size measuring 4.2 by 2.6 x 5.0 cm with a few cystic follicles.    Left ovary normal in size measuring 4.6 by 3.3 by 3.0 cm with a few cystic follicles.  2.4 cm anechoic focus within left ovary suggestive for dominant follicle    There is arterial and venous flow identified in the ovaries bilaterally.    No adnexal mass or free fluid in the pelvis.    There is fluid identified within the endocervical canal, clinical correlation advised                                  Medical Decision Making:   History:   Old Medical Records: I decided to obtain old medical records.  Initial Assessment:   38-year-old female with complaints consistent with left low back pain with associated sciatica and hemorrhagic cyst left ovary.  Vital signs stable, afebrile, neurovascularly intact.  She is alert and healthy and nontoxic appearing.  She is in no apparent distress.  Her bladder deficits.  Exam documented above.  She denies trauma or injury. no clinical evidence of spinal cord compression or cauda equina syndrome.  do not suspect serious bacterial infection.   Clinical Tests:   Lab Tests: Ordered and Reviewed  Radiological Study: Ordered and Reviewed  ED Management:  UPT, urinalysis, vaginal strain a gonorrhea and chlamydia cultures obtained.  Vaginal strain and gonorrhea and chlamydia cultures are negative. UPT negative. Urinalysis shows no evidence of serious bacterial infection, UTI or pyelonephritis.  Due to patient's left-sided pelvic pain I did decide to obtain an ultrasound to rule out torsion versus hemorrhagic cyst versus TOA.  Ultrasound does show signs of hemorrhagic cyst.  Patient was administered Toradol and Robaxin in the Emergency Department. I do not feel that the back pain that she is experiencing is secondary to the tumor on her cervical spine as her symptoms are specifically to the lower extremity.  She has no deficits on motor or sensory deficits on exam in the exam is benign.  This is likely secondary to sciatica.  Will discharge home with a prescription for Anaprox and Robaxin as well as care instructions.  She is to follow up with both her primary care physician as well as her OBGYN in the next 48 hr or return for any worsening signs or symptoms.  Given return precautions.  She states understanding.    Other:   I have discussed this case with another health care provider.       <> Summary of the Discussion: rima   This note was created using [employer]  dictation.  There may be typographical errors secondary to dictation.                        Clinical Impression:     1. Hemorrhagic cyst of left ovary    2. Pelvic pain    3. Acute left-sided low back pain with left-sided sciatica            Disposition:   Disposition: Discharged  Condition: Stable                        Sheryl Deleon PA-C  07/09/18 1425

## 2018-07-10 ENCOUNTER — TELEPHONE (OUTPATIENT)
Dept: OBSTETRICS AND GYNECOLOGY | Facility: CLINIC | Age: 38
End: 2018-07-10

## 2018-07-10 NOTE — TELEPHONE ENCOUNTER
----- Message from Mojgan Carrillo LPN sent at 7/9/2018  4:26 PM CDT -----  Dr he,   I'm at the Er now and need a follow up for this visit. I'm in pain and they did the ultrasound and found a hemorrhagic cyst.   The pain is pretty unbearable now.. they gave me muscle relaxer and pain meds.     Should I call for an appt?     07/09/2018

## 2018-07-11 ENCOUNTER — OFFICE VISIT (OUTPATIENT)
Dept: OBSTETRICS AND GYNECOLOGY | Facility: CLINIC | Age: 38
End: 2018-07-11
Payer: MEDICAID

## 2018-07-11 VITALS
WEIGHT: 245.56 LBS | SYSTOLIC BLOOD PRESSURE: 110 MMHG | DIASTOLIC BLOOD PRESSURE: 70 MMHG | HEIGHT: 66 IN | BODY MASS INDEX: 39.46 KG/M2

## 2018-07-11 DIAGNOSIS — R10.2 PELVIC PAIN IN FEMALE: Primary | ICD-10-CM

## 2018-07-11 PROCEDURE — 87660 TRICHOMONAS VAGIN DIR PROBE: CPT

## 2018-07-11 PROCEDURE — 99999 PR PBB SHADOW E&M-EST. PATIENT-LVL III: CPT | Mod: PBBFAC,,, | Performed by: OBSTETRICS & GYNECOLOGY

## 2018-07-11 PROCEDURE — 99213 OFFICE O/P EST LOW 20 MIN: CPT | Mod: S$PBB,,, | Performed by: OBSTETRICS & GYNECOLOGY

## 2018-07-11 PROCEDURE — 99213 OFFICE O/P EST LOW 20 MIN: CPT | Mod: PBBFAC,PO | Performed by: OBSTETRICS & GYNECOLOGY

## 2018-07-11 NOTE — PROGRESS NOTES
"  SUBJECTIVE:   38 y.o. female  who presents for ED follow up. She was seen in the ED on 18 for worsening L sided pelvic pain, low back pain, and L leg pain. Pain started on Friday and got progressively worse. She initially thought she had another ectopic pregnancy, as she has a h/o ectopic and the pain felt similar. Workup in the ED was negative. TVUS showed evidence of a hemorrhagic cyst on the R and small cysto on the L ovary. She was given pain medicine and discharged.   Today, she says pain is 4/10. She has not taken pain medication since last night, and thinks the pain is lessening. She continues to deny all associated symptoms including fever/chills, urinary symptoms, vaginal itching/odor.   She has a benign tumor of the spinal cord with associated LLE numbness. She cannot distinguish this pain from her back pain and says "It's hard to tell what's what."    Past Medical History:   Diagnosis Date    Arthritis     spinal    Asthma     was on advair 2010, resolved    Benign tumor of spinal cord     Depression     Ectopic pregnancy     GERD (gastroesophageal reflux disease)      Past Surgical History:   Procedure Laterality Date    APPENDECTOMY       SECTION      posteriorlaminectomy      For benign spinal tumor;      Social History     Social History    Marital status:      Spouse name: N/A    Number of children: N/A    Years of education: N/A     Occupational History    Not on file.     Social History Main Topics    Smoking status: Former Smoker     Packs/day: 1.00     Years: 13.00     Types: Cigarettes     Quit date: 10/20/2012    Smokeless tobacco: Former User    Alcohol use No    Drug use: No    Sexual activity: Yes     Partners: Male     Birth control/ protection: None     Other Topics Concern    Not on file     Social History Narrative    No narrative on file     Family History   Problem Relation Age of Onset    Liver disease Father     Breast cancer Neg Hx  "    Colon cancer Neg Hx     Ovarian cancer Neg Hx     Stroke Neg Hx     Diabetes Neg Hx     Hypertension Neg Hx      OB History    Para Term  AB Living   3 2 2 0 1 2   SAB TAB Ectopic Multiple Live Births   0 0 1 0 2      # Outcome Date GA Lbr King/2nd Weight Sex Delivery Anes PTL Lv   3 Term 10/10/17 39w0d  4.451 kg (9 lb 13 oz) F CS-LTranv Spinal, EPI N GAUDENCIO   2 Ectopic 2015     ECTOPIC      1 Term 14 40w5d  4.326 kg (9 lb 8.6 oz) F CS-LTranv EPI N GAUDENCIO        Current Outpatient Prescriptions   Medication Sig Dispense Refill    methocarbamol (ROBAXIN) 500 MG Tab Take 2 tablets (1,000 mg total) by mouth 3 (three) times daily. for 3 days 18 tablet 0    naproxen (NAPROSYN) 500 MG tablet Take 1 tablet (500 mg total) by mouth 2 (two) times daily with meals. 20 tablet 0     No current facility-administered medications for this visit.      Allergies: Patient has no known allergies.     ROS:  Constitutional: no weight loss, weight gain, fever, fatigue  Eyes:  No vision changes, glasses/contacts  ENT/Mouth: No ulcers, sinus problems, ears ringing, headache  Cardiovascular: No inability to lie flat, chest pain, exercise intolerance, swelling, heart palpitations  Respiratory: No wheezing, coughing blood, shortness of breath, or cough  Gastrointestinal: No diarrhea, bloody stool, nausea/vomiting, constipation, gas, hemorrhoids  Genitourinary: No blood in urine, painful urination, urgency of urination, frequency of urination, incomplete emptying, incontinence, abnormal bleeding, painful periods, heavy periods, vaginal odor, painful intercourse, sexual problems, bleeding after intercourse. Clear vaginal discharge x 1 day on Friday of last week. None since.   Musculoskeletal: No muscle weakness. Decreased sensation in L leg.   Skin/Breast: No painful breasts, masses, rash, ulcers. Breast feeding currently.  Neurological: No passing out, seizures, numbness, headache  Psychiatric: No depression,  "crying  Hematologic: No bruises, bleeding, swollen lymph nodes, anemia.    OBJECTIVE:   The patient appears well, alert, oriented x 3, in no distress. She appears obese.  /70   Ht 5' 6" (1.676 m)   Wt 111.4 kg (245 lb 9.5 oz)   LMP  (LMP Unknown)   Breastfeeding? Yes   BMI 39.64 kg/m²   NECK: negative, no thyromegaly, trachea midline  SKIN: normal, good color and no acne, striae, hirsutism  ABDOMEN: soft, non-tender; bowel sounds normal; no masses,  no organomegaly and no hernias, masses, or hepatosplenomegaly  GENITALIA: normal external genitalia, no erythema, no discharge  URETHRA: normal urethra, normal urethral meatus  VAGINA: vaginal discharge - minimal, with mild fishy odor noted  CERVIX: no lesions or cervical motion tenderness and discharge thisck and white/yellow  UTERUS: normal size, contour, position, consistency, mobility, non-tender  ADNEXA: no mass or fullness. Mild L sided tenderness to bimanual palpation    ASSESSMENT:     Ann Godfrey  was seen today for ED visit for pelvic pain.    PLAN:    Pelvic pain in female  - PE benign  - Affirm ordered, will call and treat if +  - Patient has appointment with Neuro in August - she will call to move up  - Will start PT if Neuro OK's    Rashard Lemos M.D.  Obstetrics & Gynecology  PGY-1       "

## 2018-07-12 LAB
CANDIDA RRNA VAG QL PROBE: NEGATIVE
G VAGINALIS RRNA GENITAL QL PROBE: NEGATIVE
T VAGINALIS RRNA GENITAL QL PROBE: NEGATIVE

## 2019-01-18 ENCOUNTER — PATIENT MESSAGE (OUTPATIENT)
Dept: PRIMARY CARE CLINIC | Facility: CLINIC | Age: 39
End: 2019-01-18

## 2019-01-27 PROBLEM — D64.9 NORMOCYTIC ANEMIA: Status: ACTIVE | Noted: 2019-01-27

## 2019-01-27 NOTE — PROGRESS NOTES
Subjective:       Patient ID: Ann Godfrey is a 38 y.o. female who  has a past medical history of Arthritis, Asthma, Benign tumor of spinal cord, Depression, Ectopic pregnancy, GERD (gastroesophageal reflux disease), and Urticaria.    Chief Complaint: Establish Care and Cough     History was obtained from the patient and supplemented through chart review and from her  who accompanied the patient.  She was previously seen by Dr. Dunia Schafer, who is no longer practicing at this location.  Last seen 2013 for benign spinal tumor.    Children are 4 years old and 1 year old.    URI:  Reports nonproductive Cough with postnasal drip for 1 month.  H/o PNA.  Wheezing has improved.  Coughing when she lies down at night.  Clear to cloudy phlegm with Difficulty expectorating sputum.  Denies nasal congestion, rhinorrhea.  Does have postnasal drip and irritated throat.  No fever or body aches.  Experiences HA when coughing.  Has tried OTC cough suppressant Robutussin twice, claritin once and cough drops.    Cough   This is a recurrent problem. The current episode started more than 1 month ago. The problem has been waxing and waning. The problem occurs every few hours. The cough is non-productive and productive of sputum. Associated symptoms include headaches, postnasal drip and wheezing. Pertinent negatives include no chest pain, chills, ear congestion, ear pain, eye redness, fever, heartburn, hemoptysis, myalgias, nasal congestion, rash, sore throat, shortness of breath, sweats or weight loss. The symptoms are aggravated by cold air, dust, lying down and stress. She has tried OTC cough suppressant and rest for the symptoms. The treatment provided mild relief. Her past medical history is significant for asthma, bronchitis and pneumonia.     AR: The patient reports nasal congestion, post-nasal drip, sore throat and dry cough.  Symptoms are worse when she travels to a colder area.  Recently went to [facility].  Symptoms  "are not worse when She is inside or outside.  The patient does not have pets; does have a small carpet in the bedroom.  The patient does not smoke.    Asthma:  When she first moved to [place].  Hasn't used an inhaler in 6 years.  Not worsened by exercise.  Not progressively worsening.    Acid reflux:  Not required PPI.  Improved after she started to eat healthier.  Denies sour taste in the back of her throat.  Hasn't had it in 3 weeks.    Benign spinal tumor: C1-C3 assiciated with chronic L sided numbness, weakness, tingling.  Underwent Radiation 2 years ago due to growth.  Has MRI annually.  Has OSH NGSY.    Chronic urticaria:  Experiences it when she eats processed foods, heavy meat.  Has made dietary changes and has not had a break out in a month    Normocytic anemia:   H/o menorrhagia.  Reports regular cycles. Uses 18 pads per day, has to change her pads every 45 minutes - 1 hour.  Periods last 3 days.  Denies CP, SOB, VILLA.  Does get lightheadedness, fatigued during her periods.  Denies PICA syndrome. The patient denies hematochezia, melena, hematuria.      Menorrhagia:  As above    L hemorrhagic ovarian cyst:  2.4 cm.  Seen on TVUS due to pelvic pain. 4 cm R ovarian area suggestive of cyst. Has seen gyn.  Still with mild pelvic pain "pulling".    History of tobacco use:  Quit 10 years ago.    Obesity:  BMI 39.  Started doing cardio 40 minutes a day.  Just started going to the gym at 4:00 a.m. before work.  Cooking meals at home.  Eat lots of greens, doesn't eat much meat.    Review of Systems   Constitutional: Negative for chills, fever and weight loss.   HENT: Positive for postnasal drip. Negative for ear pain and sore throat.    Eyes: Negative for discharge and redness.   Respiratory: Positive for cough and wheezing. Negative for hemoptysis and shortness of breath.    Cardiovascular: Negative for chest pain and palpitations.   Gastrointestinal: Negative for abdominal pain, constipation and heartburn. "   Genitourinary: Positive for menstrual problem. Negative for dysuria.   Musculoskeletal: Negative for gait problem and myalgias.   Skin: Negative for rash and wound.   Neurological: Positive for numbness and headaches.   Hematological: Negative for adenopathy.   Psychiatric/Behavioral: Negative for self-injury and suicidal ideas.       Past Medical History:   Diagnosis Date    Arthritis     spinal    Asthma     was on advair , resolved    Benign tumor of spinal cord     Depression     Ectopic pregnancy     GERD (gastroesophageal reflux disease)     Urticaria      Past Surgical History:   Procedure Laterality Date    APPENDECTOMY       SECTION      x2    DELIVERY-CEASAREAN SECTION N/A 2014    [facility]    DELIVERY- SECTION N/A 10/10/2017    [facility]th Weber [facility] at [facility] L[facility]D    ECTOPIC PREGNANCY SURGERY      LAPAROSCOPY-DIAGNOSTIC N/A 2015    [facility] OR    LAPAROTOMY N/A 2015    [facility] OR    posteriorlaminectomy      For benign spinal tumor;     SALPINGECTOMY Left 2015    [facility] OR     Family History   Problem Relation Age of Onset    Hypertension Mother     Hepatitis Father         cleared    Breast cancer Neg Hx     Colon cancer Neg Hx     Ovarian cancer Neg Hx     Stroke Neg Hx     Diabetes Neg Hx     Heart disease Neg Hx      Social History     Socioeconomic History    Marital status:      Spouse name: Not on file    Number of children: Not on file    Years of education: Not on file    Highest education level: Not on file   Social Needs    Financial resource strain: Not on file    Food insecurity - worry: Not on file    Food insecurity - inability: Not on file    Transportation needs - medical: Not on file    Transportation needs - non-medical: Not on file   Occupational  "History    Not on file   Tobacco Use    Smoking status: Former Smoker     Packs/day: 1.00     Years: 13.00     Pack years: 13.00     Types: Cigarettes     Last attempt to quit: 10/20/2012     Years since quittin.2    Smokeless tobacco: Former User   Substance and Sexual Activity    Alcohol use: No    Drug use: No    Sexual activity: Yes     Partners: Male     Birth control/protection: None   Other Topics Concern    Not on file   Social History Narrative    Not on file     Objective:      Vitals:    19 0938   BP: 110/80   Pulse: 85   SpO2: 98%   Weight: 110.7 kg (244 lb 0.8 oz)   Height: 5' 6" (1.676 m)      Physical Exam   Constitutional: She appears well-developed and well-nourished. No distress.   HENT:   Head: Normocephalic and atraumatic.   Nose: Mucosal edema present. Right sinus exhibits no maxillary sinus tenderness and no frontal sinus tenderness. Left sinus exhibits no maxillary sinus tenderness and no frontal sinus tenderness.   Mouth/Throat: Uvula is midline and mucous membranes are normal. Posterior oropharyngeal erythema present. No oropharyngeal exudate or posterior oropharyngeal edema.   Eyes: EOM are normal. Pupils are equal, round, and reactive to light. Right eye exhibits no discharge. Left eye exhibits no discharge. Right conjunctiva is not injected. Left conjunctiva is not injected. No scleral icterus.   Neck: Neck supple. No tracheal deviation present. No thyromegaly present.   Cardiovascular: Normal rate, regular rhythm, normal heart sounds and intact distal pulses.   No murmur heard.  Pulmonary/Chest: Effort normal and breath sounds normal. No stridor. No respiratory distress. She has no wheezes.   Abdominal: Soft. Bowel sounds are normal. There is no tenderness.   Musculoskeletal: She exhibits no edema, tenderness or deformity.   Lymphadenopathy:     She has no cervical adenopathy.   Neurological: She is alert. Gait normal.   Skin: Skin is warm and dry. Capillary refill " takes less than 2 seconds. She is not diaphoretic. No erythema.   Psychiatric: She has a normal mood and affect. Her behavior is normal.         Lab Results   Component Value Date    WBC 9.79 10/11/2017    HGB 8.8 (L) 10/11/2017    HCT 26.8 (L) 10/11/2017    PLT 86 (L) 10/11/2017    CHOL 205 (H) 11/06/2012    TRIG 149 11/06/2012    HDL 49 11/06/2012    ALT 28 07/08/2015    AST 19 07/08/2015     07/08/2015     07/08/2015    K 4.1 07/08/2015    K 4.1 07/08/2015     07/08/2015     07/08/2015    CREATININE 0.6 07/08/2015    CREATININE 0.6 07/08/2015    BUN 8 07/08/2015    BUN 8 07/08/2015    CO2 23 07/08/2015    CO2 23 07/08/2015    TSH 1.673 11/06/2012    HGBA1C 5.8 11/06/2012       The ASCVD Risk score ([facility] [place] PAMELA Jr., et al., 2013) failed to calculate for the following reasons:    The 2013 ASCVD risk score is only valid for ages 40 to 79    (Imaging have been independently reviewed)  L hemorrhagic ovarian cyst:  2.4 cm.  Seen on TVUS due to pelvic pain. 4 cm R ovarian area suggestive of cyst.     Assessment:       1. Annual physical exam    2. Acute nasopharyngitis    3. Allergic rhinitis due to other allergic trigger, unspecified seasonality    4. Mild intermittent asthma without complication    5. Gastroesophageal reflux disease, esophagitis presence not specified    6. Benign tumor of spinal cord    7. Urticaria    8. Normocytic anemia    9. Menorrhagia with regular cycle    10. Obesity (BMI 30-39.9)    11. Encounter for lipid screening for cardiovascular disease    12. Encounter for screening for diabetes mellitus    13. Flu vaccine need          Plan:       Ann was seen today for establish care and cough.    Diagnoses and all orders for this visit:    Annual physical exam  -     CBC auto differential; Future  -     Comprehensive metabolic panel; Future    Acute nasopharyngitis  Comments:  Supportive treatment, Mucinex DM and Flonase.  Orders:  -     fluticasone (FLONASE) 50 mcg/actuation  nasal spray; 1 spray (50 mcg total) by Each Nare route once daily.  -     dextromethorphan-guaifenesin  mg (MUCINEX DM)  mg per 12 hr tablet; Take 1 tablet by mouth 2 (two) times daily as needed.    Allergic rhinitis due to other allergic trigger, unspecified seasonality  Comments:  Worsened in the winter months.  Discussed correct Flonase use.  Orders:  -     fluticasone (FLONASE) 50 mcg/actuation nasal spray; 1 spray (50 mcg total) by Each Nare route once daily.  -     dextromethorphan-guaifenesin  mg (MUCINEX DM)  mg per 12 hr tablet; Take 1 tablet by mouth 2 (two) times daily as needed.    Mild intermittent asthma without complication  Comments:  Well controlled.  Has not required inhaler in 6 years.    Gastroesophageal reflux disease, esophagitis presence not specified  Comments:  Has not required PPI.  Improvement after dietary changes.  Provided handout on GERD    Benign tumor of spinal cord  Comments:  s/p resection.  Chronic left-sided numbness and weakness. Underwent radiation 2 years ago.  Has annual MRIs.  Follows with KARINE GAYTAN    Urticaria  Comments:  Chronic.  Improved after she decreased eating processed foods and heavy meat.    Normocytic anemia  Comments:  Likely secondary to menorrhagia.  Orders:  -     CBC auto differential; Future  -     Ferritin; Future  -     Iron and TIBC; Future  -     Vitamin B12; Future    Menorrhagia with regular cycle  Comments:  Follows with OBGYN.  Checking for anemia As above    Obesity (BMI 30-39.9)  Comments:  Has improved diet.  Recently started working out at the gym.  Discussed regular exercise    Encounter for lipid screening for cardiovascular disease  Comments:  Checking due to obesity  Orders:  -     Lipid panel; Future    Encounter for screening for diabetes mellitus  Comments:  Checking due to obesity  Orders:  -     Hemoglobin A1c; Future    Flu vaccine need  Comments:  Encouraged to get at pharmacy after URI has resolved    Other  orders  -     Cancel: Ambulatory Referral to Neurosurgery; Future  -     Cancel: Ambulatory Referral to Physical Therapy; Future         Notification of Lab Results: [facility]    Side effects of medication(s) were discussed in detail and patient voiced understanding.  Patient will call back for any issues or complications.     RTC in 1 year(s) or sooner PRN for well visit.

## 2019-01-28 ENCOUNTER — OFFICE VISIT (OUTPATIENT)
Dept: INTERNAL MEDICINE | Facility: CLINIC | Age: 39
End: 2019-01-28
Payer: COMMERCIAL

## 2019-01-28 ENCOUNTER — PATIENT MESSAGE (OUTPATIENT)
Dept: INTERNAL MEDICINE | Facility: CLINIC | Age: 39
End: 2019-01-28

## 2019-01-28 VITALS
WEIGHT: 244.06 LBS | HEART RATE: 85 BPM | HEIGHT: 66 IN | SYSTOLIC BLOOD PRESSURE: 110 MMHG | OXYGEN SATURATION: 98 % | BODY MASS INDEX: 39.22 KG/M2 | DIASTOLIC BLOOD PRESSURE: 80 MMHG

## 2019-01-28 DIAGNOSIS — Z00.00 ANNUAL PHYSICAL EXAM: Primary | ICD-10-CM

## 2019-01-28 DIAGNOSIS — J45.20 MILD INTERMITTENT ASTHMA WITHOUT COMPLICATION: ICD-10-CM

## 2019-01-28 DIAGNOSIS — J00 ACUTE NASOPHARYNGITIS: ICD-10-CM

## 2019-01-28 DIAGNOSIS — D33.4 BENIGN TUMOR OF SPINAL CORD: ICD-10-CM

## 2019-01-28 DIAGNOSIS — Z13.1 ENCOUNTER FOR SCREENING FOR DIABETES MELLITUS: ICD-10-CM

## 2019-01-28 DIAGNOSIS — J30.89 ALLERGIC RHINITIS DUE TO OTHER ALLERGIC TRIGGER, UNSPECIFIED SEASONALITY: ICD-10-CM

## 2019-01-28 DIAGNOSIS — K21.9 GASTROESOPHAGEAL REFLUX DISEASE, ESOPHAGITIS PRESENCE NOT SPECIFIED: ICD-10-CM

## 2019-01-28 DIAGNOSIS — Z13.220 ENCOUNTER FOR LIPID SCREENING FOR CARDIOVASCULAR DISEASE: ICD-10-CM

## 2019-01-28 DIAGNOSIS — E66.9 OBESITY (BMI 30-39.9): ICD-10-CM

## 2019-01-28 DIAGNOSIS — D64.9 NORMOCYTIC ANEMIA: ICD-10-CM

## 2019-01-28 DIAGNOSIS — L50.9 URTICARIA: ICD-10-CM

## 2019-01-28 DIAGNOSIS — Z23 FLU VACCINE NEED: ICD-10-CM

## 2019-01-28 DIAGNOSIS — N92.0 MENORRHAGIA WITH REGULAR CYCLE: ICD-10-CM

## 2019-01-28 DIAGNOSIS — Z13.6 ENCOUNTER FOR LIPID SCREENING FOR CARDIOVASCULAR DISEASE: ICD-10-CM

## 2019-01-28 PROBLEM — J30.9 ALLERGIC RHINITIS: Status: ACTIVE | Noted: 2019-01-28

## 2019-01-28 PROCEDURE — 99385 PR PREVENTIVE VISIT,NEW,18-39: ICD-10-PCS | Mod: S$GLB,,, | Performed by: INTERNAL MEDICINE

## 2019-01-28 PROCEDURE — 99999 PR PBB SHADOW E&M-EST. PATIENT-LVL IV: CPT | Mod: PBBFAC,,, | Performed by: INTERNAL MEDICINE

## 2019-01-28 PROCEDURE — 99999 PR PBB SHADOW E&M-EST. PATIENT-LVL IV: ICD-10-PCS | Mod: PBBFAC,,, | Performed by: INTERNAL MEDICINE

## 2019-01-28 PROCEDURE — 99385 PREV VISIT NEW AGE 18-39: CPT | Mod: S$GLB,,, | Performed by: INTERNAL MEDICINE

## 2019-01-28 RX ORDER — FLUTICASONE PROPIONATE 50 MCG
1 SPRAY, SUSPENSION (ML) NASAL DAILY
Qty: 16 G | Refills: 1 | Status: SHIPPED | OUTPATIENT
Start: 2019-01-28 | End: 2019-02-27

## 2019-01-29 ENCOUNTER — LAB VISIT (OUTPATIENT)
Dept: LAB | Facility: OTHER | Age: 39
End: 2019-01-29
Attending: INTERNAL MEDICINE
Payer: COMMERCIAL

## 2019-01-29 DIAGNOSIS — Z13.1 ENCOUNTER FOR SCREENING FOR DIABETES MELLITUS: ICD-10-CM

## 2019-01-29 DIAGNOSIS — Z13.220 ENCOUNTER FOR LIPID SCREENING FOR CARDIOVASCULAR DISEASE: ICD-10-CM

## 2019-01-29 DIAGNOSIS — Z13.6 ENCOUNTER FOR LIPID SCREENING FOR CARDIOVASCULAR DISEASE: ICD-10-CM

## 2019-01-29 DIAGNOSIS — Z00.00 ANNUAL PHYSICAL EXAM: ICD-10-CM

## 2019-01-29 DIAGNOSIS — D64.9 NORMOCYTIC ANEMIA: ICD-10-CM

## 2019-01-29 LAB
ALBUMIN SERPL BCP-MCNC: 4.1 G/DL
ALP SERPL-CCNC: 71 U/L
ALT SERPL W/O P-5'-P-CCNC: 118 U/L
ANION GAP SERPL CALC-SCNC: 12 MMOL/L
AST SERPL-CCNC: 62 U/L
BASOPHILS # BLD AUTO: 0.04 K/UL
BASOPHILS NFR BLD: 0.6 %
BILIRUB SERPL-MCNC: 1 MG/DL
BUN SERPL-MCNC: 8 MG/DL
CALCIUM SERPL-MCNC: 9.4 MG/DL
CHLORIDE SERPL-SCNC: 106 MMOL/L
CHOLEST SERPL-MCNC: 204 MG/DL
CHOLEST/HDLC SERPL: 4.2 {RATIO}
CO2 SERPL-SCNC: 22 MMOL/L
CREAT SERPL-MCNC: 0.7 MG/DL
DIFFERENTIAL METHOD: NORMAL
EOSINOPHIL # BLD AUTO: 0.2 K/UL
EOSINOPHIL NFR BLD: 2.9 %
ERYTHROCYTE [DISTWIDTH] IN BLOOD BY AUTOMATED COUNT: 13.8 %
EST. GFR  (AFRICAN AMERICAN): >60 ML/MIN/1.73 M^2
EST. GFR  (NON AFRICAN AMERICAN): >60 ML/MIN/1.73 M^2
ESTIMATED AVG GLUCOSE: 120 MG/DL
FERRITIN SERPL-MCNC: 137 NG/ML
GLUCOSE SERPL-MCNC: 87 MG/DL
HBA1C MFR BLD HPLC: 5.8 %
HCT VFR BLD AUTO: 40.2 %
HDLC SERPL-MCNC: 49 MG/DL
HDLC SERPL: 24 %
HGB BLD-MCNC: 13.2 G/DL
IRON SERPL-MCNC: 93 UG/DL
LDLC SERPL CALC-MCNC: 134 MG/DL
LYMPHOCYTES # BLD AUTO: 1.8 K/UL
LYMPHOCYTES NFR BLD: 27.6 %
MCH RBC QN AUTO: 29.2 PG
MCHC RBC AUTO-ENTMCNC: 32.8 G/DL
MCV RBC AUTO: 89 FL
MONOCYTES # BLD AUTO: 0.4 K/UL
MONOCYTES NFR BLD: 6.4 %
NEUTROPHILS # BLD AUTO: 4.1 K/UL
NEUTROPHILS NFR BLD: 62.5 %
NONHDLC SERPL-MCNC: 155 MG/DL
PLATELET # BLD AUTO: 203 K/UL
PMV BLD AUTO: 12.5 FL
POTASSIUM SERPL-SCNC: 4 MMOL/L
PROT SERPL-MCNC: 7.6 G/DL
RBC # BLD AUTO: 4.52 M/UL
SATURATED IRON: 22 %
SODIUM SERPL-SCNC: 140 MMOL/L
TOTAL IRON BINDING CAPACITY: 431 UG/DL
TRANSFERRIN SERPL-MCNC: 291 MG/DL
TRIGL SERPL-MCNC: 105 MG/DL
VIT B12 SERPL-MCNC: 670 PG/ML
WBC # BLD AUTO: 6.56 K/UL

## 2019-01-29 PROCEDURE — 82728 ASSAY OF FERRITIN: CPT

## 2019-01-29 PROCEDURE — 82607 VITAMIN B-12: CPT

## 2019-01-29 PROCEDURE — 36415 COLL VENOUS BLD VENIPUNCTURE: CPT

## 2019-01-29 PROCEDURE — 80061 LIPID PANEL: CPT

## 2019-01-29 PROCEDURE — 80053 COMPREHEN METABOLIC PANEL: CPT

## 2019-01-29 PROCEDURE — 85025 COMPLETE CBC W/AUTO DIFF WBC: CPT

## 2019-01-29 PROCEDURE — 83036 HEMOGLOBIN GLYCOSYLATED A1C: CPT

## 2019-01-29 PROCEDURE — 83540 ASSAY OF IRON: CPT

## 2019-02-13 ENCOUNTER — PATIENT MESSAGE (OUTPATIENT)
Dept: RADIATION ONCOLOGY | Facility: CLINIC | Age: 39
End: 2019-02-13

## 2019-03-11 ENCOUNTER — TELEPHONE (OUTPATIENT)
Dept: NEUROSURGERY | Facility: CLINIC | Age: 39
End: 2019-03-11

## 2019-03-11 DIAGNOSIS — D33.4 BENIGN TUMOR OF SPINAL CORD: Primary | ICD-10-CM

## 2019-03-25 ENCOUNTER — TELEPHONE (OUTPATIENT)
Dept: NEUROSURGERY | Facility: CLINIC | Age: 39
End: 2019-03-25

## 2019-05-31 ENCOUNTER — OFFICE VISIT (OUTPATIENT)
Dept: INTERNAL MEDICINE | Facility: CLINIC | Age: 39
End: 2019-05-31
Payer: COMMERCIAL

## 2019-05-31 VITALS
SYSTOLIC BLOOD PRESSURE: 106 MMHG | TEMPERATURE: 98 F | BODY MASS INDEX: 38.27 KG/M2 | DIASTOLIC BLOOD PRESSURE: 76 MMHG | OXYGEN SATURATION: 98 % | HEIGHT: 66 IN | HEART RATE: 115 BPM | WEIGHT: 238.13 LBS

## 2019-05-31 DIAGNOSIS — J45.20 MILD INTERMITTENT ASTHMA WITHOUT COMPLICATION: ICD-10-CM

## 2019-05-31 DIAGNOSIS — K21.9 GASTROESOPHAGEAL REFLUX DISEASE, ESOPHAGITIS PRESENCE NOT SPECIFIED: ICD-10-CM

## 2019-05-31 DIAGNOSIS — L50.9 URTICARIA: ICD-10-CM

## 2019-05-31 DIAGNOSIS — R68.81 EARLY SATIETY: ICD-10-CM

## 2019-05-31 DIAGNOSIS — N92.0 MENORRHAGIA WITH REGULAR CYCLE: ICD-10-CM

## 2019-05-31 DIAGNOSIS — D33.4 BENIGN TUMOR OF SPINAL CORD: ICD-10-CM

## 2019-05-31 DIAGNOSIS — R73.03 PRE-DIABETES: ICD-10-CM

## 2019-05-31 DIAGNOSIS — J01.90 ACUTE NON-RECURRENT SINUSITIS, UNSPECIFIED LOCATION: Primary | ICD-10-CM

## 2019-05-31 DIAGNOSIS — N83.202 CYST OF LEFT OVARY: ICD-10-CM

## 2019-05-31 DIAGNOSIS — E66.9 OBESITY (BMI 35.0-39.9 WITHOUT COMORBIDITY): ICD-10-CM

## 2019-05-31 DIAGNOSIS — J30.89 ALLERGIC RHINITIS DUE TO OTHER ALLERGIC TRIGGER, UNSPECIFIED SEASONALITY: ICD-10-CM

## 2019-05-31 LAB
CTP QC/QA: YES
S PYO RRNA THROAT QL PROBE: NEGATIVE

## 2019-05-31 PROCEDURE — 99215 OFFICE O/P EST HI 40 MIN: CPT | Mod: 25,S$GLB,, | Performed by: INTERNAL MEDICINE

## 2019-05-31 PROCEDURE — 99215 PR OFFICE/OUTPT VISIT, EST, LEVL V, 40-54 MIN: ICD-10-PCS | Mod: 25,S$GLB,, | Performed by: INTERNAL MEDICINE

## 2019-05-31 PROCEDURE — 3008F PR BODY MASS INDEX (BMI) DOCUMENTED: ICD-10-PCS | Mod: CPTII,S$GLB,, | Performed by: INTERNAL MEDICINE

## 2019-05-31 PROCEDURE — 3008F BODY MASS INDEX DOCD: CPT | Mod: CPTII,S$GLB,, | Performed by: INTERNAL MEDICINE

## 2019-05-31 PROCEDURE — 99999 PR PBB SHADOW E&M-EST. PATIENT-LVL V: ICD-10-PCS | Mod: PBBFAC,,, | Performed by: INTERNAL MEDICINE

## 2019-05-31 PROCEDURE — 99999 PR PBB SHADOW E&M-EST. PATIENT-LVL V: CPT | Mod: PBBFAC,,, | Performed by: INTERNAL MEDICINE

## 2019-05-31 PROCEDURE — 87880 POCT RAPID STREP A: ICD-10-PCS | Mod: QW,S$GLB,, | Performed by: INTERNAL MEDICINE

## 2019-05-31 PROCEDURE — 87880 STREP A ASSAY W/OPTIC: CPT | Mod: QW,S$GLB,, | Performed by: INTERNAL MEDICINE

## 2019-05-31 RX ORDER — PREDNISONE 20 MG/1
40 TABLET ORAL DAILY
Qty: 10 TABLET | Refills: 0 | Status: CANCELLED | OUTPATIENT
Start: 2019-05-31 | End: 2019-06-05

## 2019-05-31 RX ORDER — AMOXICILLIN AND CLAVULANATE POTASSIUM 875; 125 MG/1; MG/1
1 TABLET, FILM COATED ORAL EVERY 12 HOURS
Qty: 10 TABLET | Refills: 0 | Status: SHIPPED | OUTPATIENT
Start: 2019-05-31 | End: 2019-06-05

## 2019-05-31 RX ORDER — CODEINE PHOSPHATE AND GUAIFENESIN 10; 100 MG/5ML; MG/5ML
5-10 SOLUTION ORAL EVERY 4 HOURS PRN
Qty: 118 ML | Refills: 0 | Status: SHIPPED | OUTPATIENT
Start: 2019-05-31 | End: 2019-06-10

## 2019-05-31 RX ORDER — ALBUTEROL SULFATE 90 UG/1
2 AEROSOL, METERED RESPIRATORY (INHALATION) EVERY 6 HOURS PRN
Qty: 18 G | Refills: 1 | Status: SHIPPED | OUTPATIENT
Start: 2019-05-31 | End: 2020-05-30

## 2019-05-31 NOTE — PATIENT INSTRUCTIONS
Our billing department is [phone] to ask them about the anticipated cost.    I recommend plenty of rest and hydration.  Tylenol and NSAIDs, such as ibuprofen, Motrin, naproxen can be helpful for sore throat, headache, ear pain, muscle and joint pain, sneezing, fatigue.  Chloroseptic spray for sore throat.  Over-the-counter antihistamines (Allegra, Claritin, Zyrtec) for runny nose and decongestants (Sudafed) can also be helpful.  Nasal saline once to twice a day.  Hand washing can help prevent the spread of respiratory illnesses, especially from younger children.

## 2019-05-31 NOTE — PROGRESS NOTES
Subjective:       Patient ID: Ann Godfrey is a 38 y.o. female who  has a past medical history of Arthritis, Asthma, Benign tumor of spinal cord, Depression, Ectopic pregnancy, GERD (gastroesophageal reflux disease), Pre-diabetes, and Urticaria.    Chief Complaint: Cough and Sore Throat     History was obtained from the patient and supplemented through chart review who accompanied the patient.  There were no ER or clinic visits since our last appointment.    Children are 4 years old and 1 year old.    Sore Throat    This is a new problem. The current episode started in the past 7 days. The problem has been rapidly worsening. Neither side of throat is experiencing more pain than the other. The maximum temperature recorded prior to her arrival was 101 - 101.9 F. The fever has been present for 1 to 2 days. The pain is at a severity of 6/10. The pain is moderate. Associated symptoms include congestion, a hoarse voice, neck pain, stridor, swollen glands, trouble swallowing and vomiting. Pertinent negatives include no abdominal pain, diarrhea, drooling, ear discharge or plugged ear sensation. She has had no exposure to strep or mono. She has tried acetaminophen, gargles and oral narcotic analgesics for the symptoms. The treatment provided mild relief.     Fever, sore throat:    1 week ago with sore throat.  That evening, developed 103 fever, chills.  Stayed in bed x 3 days, but not completely better.  Constant cough started 3 days ago with orange green sputum-> light yellow.  Has continued to have fever every night.  Last fever was last night to 101.3.  Taking Nyquill.  Slight rhinorrhea.  L LAD TTP, hard to swallow.  Postussive emesis.  Some wheezing with coughing.    Chronic urticaria:    Experiences it when she eats processed foods, heavy meat, heat, that improved with dietary changes.  No association with dairy.  Had allergy testing years ago and was considering autoimmune urticaria.    Applied a heating  "blanket due to chills and the heat made her urticaria worse.  Also tends to have urticaria during illnesses.  Has a diffuse erythemaous rash everywhere with pruritus as if her "body is on fire".  Feels it under her nails which is uncomfortable.  Improves during the day.  Flared up 4 days ago, causing difficulty sleeping hard to sleep.  Face is swollen in the morning but no SOB, tongue , throat, or lip swelling.  Now taking Benadryl at night, which doesn't help.  Has taken prednisone in the past it would not like to take this at this moment.     AR:   Nasal congestion, post-nasal drip, sore throat and dry cough.  Symptoms are worse when she travels to a colder area, but also has been worsen in [place].  Symptoms are not worse when She is inside or outside.  The patient does not have pets; does have a small carpet in the bedroom.  The patient does not smoke.    Asthma:    When she first moved to [place].  Hasn't used an inhaler in 6 years.  Not worsened by exercise.  Not progressively worsening.  However, slight wheezing with cough as above.    Pre diabetes:   Hemoglobin A1C   Date Value Ref Range Status   01/29/2019 5.8 (H) 4.0 - 5.6 % Final     Comment:     ADA Screening Guidelines:  5.7-6.4%  Consistent with prediabetes  >or=6.5%  Consistent with diabetes  High levels of fetal hemoglobin interfere with the HbA1C  assay. Heterozygous hemoglobin variants (HbS, HgC, etc)do  not significantly interfere with this assay.   However, presence of multiple variants may affect accuracy.     11/06/2012 5.8 4.0 - 6.2 % Final     Obesity:    BMI 38.  Started doing cardio 40 minutes a day.  Just started going to the gym at 4:00 a.m. before work.  Cooking meals at home.  Eat lots of greens, doesn't eat much meat.  Admits to weight gain since moving to [place].    Acid reflux:    Not required PPI.  Improved after she started to eat healthier.      Early satiety, nausea:  Had spinal surgery 10 years ago and since then " "had difficulty defecating and required retraining.  Also with nausea and early satiety with eating.  Was recommended to get an EGD but never had this done.    Benign spinal tumor:   C1-C3 assiciated with chronic L sided numbness, weakness, tingling.  Underwent Radiation 2 years ago due to growth.  Has MRI annually.  Has not seen [facility] yet because copay for MRI was $2500.  Was seeing [facility].     Menorrhagia:    Reports regular cycles. Uses 18 pads per day, has to change her pads every 45 minutes - 1 hour.  Periods last 3 days.  Denies CP, SOB, VILLA.  Does get lightheadedness, fatigued during her periods.  Denies PICA syndrome. The patient denies hematochezia, melena, hematuria.    Anemia resolved and iron panel normal.    L hemorrhagic ovarian cyst:    2.4 cm.  Seen on TVUS due to pelvic pain. 4 cm R ovarian area suggestive of cyst. Has seen gyn.  Still with mild pelvic pain "pulling".    Review of Systems   Constitutional: Positive for fever. Negative for unexpected weight change.   HENT: Positive for congestion, hoarse voice, rhinorrhea, sore throat and trouble swallowing. Negative for drooling, ear discharge and voice change.    Eyes: Negative for discharge and redness.   Respiratory: Positive for wheezing and stridor.    Cardiovascular: Negative for chest pain and palpitations.   Gastrointestinal: Positive for nausea and vomiting. Negative for abdominal pain, constipation and diarrhea.   Genitourinary: Positive for menstrual problem. Negative for dysuria.   Musculoskeletal: Positive for neck pain. Negative for gait problem.   Skin: Positive for rash. Negative for wound.   Neurological: Positive for numbness. Negative for weakness.   Hematological: Negative for adenopathy.   Psychiatric/Behavioral: Negative for confusion. The patient is not nervous/anxious.        I personally reviewed Past Medical History, Past Surgical History, Social History, and Family History.    Objective:      Vitals:    05/31/19 " "1100   BP: 106/76   Pulse: (!) 115   Temp: 98.3 °F (36.8 °C)   TempSrc: Oral   SpO2: 98%   Weight: 108 kg (238 lb 1.6 oz)   Height: 5' 6" (1.676 m)      Physical Exam   Constitutional: She appears well-developed and well-nourished. No distress.   HENT:   Head: Normocephalic and atraumatic.   Nose: No mucosal edema. Right sinus exhibits no maxillary sinus tenderness and no frontal sinus tenderness. Left sinus exhibits no maxillary sinus tenderness and no frontal sinus tenderness.   Mouth/Throat: Uvula is midline and mucous membranes are normal. Posterior oropharyngeal erythema present. No oropharyngeal exudate or posterior oropharyngeal edema.   No angioedema. Clear rhinorrhea   Eyes: Pupils are equal, round, and reactive to light. EOM are normal. Right eye exhibits no discharge. Left eye exhibits no discharge. Right conjunctiva is not injected. Left conjunctiva is not injected. No scleral icterus.   Neck: Neck supple. No tracheal deviation present. No thyromegaly present.   Cardiovascular: Normal rate, regular rhythm, normal heart sounds and intact distal pulses.   No murmur heard.  Pulmonary/Chest: Effort normal. No stridor. No respiratory distress. She has wheezes.   Speaking in complete sentences.  Dry cough.  Slight expiratory wheeze when coughing.   Abdominal: Soft. Bowel sounds are normal. There is no tenderness.   Musculoskeletal: She exhibits no edema, tenderness or deformity.   Lymphadenopathy:        Head (right side): Submandibular adenopathy present. No submental adenopathy present.        Head (left side): No submental and no submandibular adenopathy present.     She has no cervical adenopathy.   R submandibular TTP   Neurological: She is alert. Gait normal.   Skin: Skin is warm and dry. Capillary refill takes less than 2 seconds. Rash noted. She is not diaphoretic. No erythema.   Diffuse, flat, erythematous rash without clear borders on extremities.   Psychiatric: She has a normal mood and affect. Her " behavior is normal.         Lab Results   Component Value Date    WBC 6.56 01/29/2019    HGB 13.2 01/29/2019    HCT 40.2 01/29/2019     01/29/2019    CHOL 204 (H) 01/29/2019    TRIG 105 01/29/2019    HDL 49 01/29/2019     (H) 01/29/2019    AST 62 (H) 01/29/2019     01/29/2019    K 4.0 01/29/2019     01/29/2019    CREATININE 0.7 01/29/2019    BUN 8 01/29/2019    CO2 22 (L) 01/29/2019    TSH 1.673 11/06/2012    HGBA1C 5.8 (H) 01/29/2019       The ASCVD Risk score (Peekskillalonso SANTIAGO Jr., et al., 2013) failed to calculate for the following reasons:    The 2013 ASCVD risk score is only valid for ages 40 to 79    (Imaging have been independently reviewed)  L hemorrhagic ovarian cyst:  2.4 cm.  Seen on TVUS due to pelvic pain. 4 cm R ovarian area suggestive of cyst.     Assessment:       1. Acute non-recurrent sinusitis, unspecified location    2. Urticaria    3. Allergic rhinitis due to other allergic trigger, unspecified seasonality    4. Mild intermittent asthma without complication    5. Pre-diabetes    6. Obesity (BMI 35.0-39.9 without comorbidity)    7. Gastroesophageal reflux disease, esophagitis presence not specified    8. Early satiety    9. Benign tumor of spinal cord    10. Menorrhagia with regular cycle    11. Cyst of left ovary          Plan:       Ann was seen today for cough and sore throat.    Diagnoses and all orders for this visit:    Acute non-recurrent sinusitis, unspecified location  Comments:  Strep neg. Augmentin, codeine/guaifenesin, hydration, NSAIDs/chloroseptic spray for sore throat, Motrin/tylenol for fever  Orders:  -     POCT rapid strep A  -     albuterol (PROAIR HFA) 90 mcg/actuation inhaler; Inhale 2 puffs into the lungs every 6 (six) hours as needed for Wheezing or Shortness of Breath. Rescue  -     amoxicillin-clavulanate 875-125mg (AUGMENTIN) 875-125 mg per tablet; Take 1 tablet by mouth every 12 (twelve) hours. for 5 days  -     guaifenesin-codeine 100-10 mg/5 ml  (TUSSI-ORGANIDIN NR)  mg/5 mL syrup; Take 5-10 mLs by mouth every 4 (four) hours as needed for Cough. Max 60mL/24 hours    Urticaria  Comments:  Worse with illness, heat. Continue Benadryl q.h.s. She declined prednisone.  ED return precautions for anaphylaxis.  Referral to allergy to complete testing.  Orders:  -     Ambulatory Referral to Allergy    Allergic rhinitis due to other allergic trigger, unspecified seasonality  Comments:  Worsened in the winter months.  Continue Flonase.  Refer to Allergy.    Mild intermittent asthma without complication  Comments:  Well controlled.  Has not required inhaler in 6 years. Rx albuterol inhaler for sinusitis as above.    Pre-diabetes  Comments:  Diet controlled.  A1c 5.8.     Obesity (BMI 35.0-39.9 without comorbidity)  Comments:  Has improved diet.  Started working out at the gym.      Gastroesophageal reflux disease, esophagitis presence not specified  Comments:  Has not required PPI.  Improvement after dietary changes.      Early satiety  Comments:  GI issues s/p spinal tumor resection. Refer to GI; likely needs EGD, maybe barium swallow  Orders:  -     Ambulatory referral to Gastroenterology    Benign tumor of spinal cord  Comments:  s/p resection.  Chronic left-sided numbness and weakness. Underwent radiation 2 years ago.  Has annual MRIs; provided # for billing for cost. Will see [facility]GS[facility].    Menorrhagia with regular cycle  Comments:  Follows with OBGYN.   Iron panel normal.    Cyst of left ovary  Comments:  Follows with OBGYN    Other orders  -     Cancel: predniSONE (DELTASONE) 20 MG tablet; Take 2 tablets (40 mg total) by mouth once daily. for 5 days         Notification of Lab Results: [facility]    Side effects of medication(s) were discussed in detail and patient voiced understanding.  Patient will call back for any issues or complications.     RTC PRN since she will see subspecialists.

## 2019-06-18 ENCOUNTER — OFFICE VISIT (OUTPATIENT)
Dept: ALLERGY | Facility: CLINIC | Age: 39
End: 2019-06-18
Payer: COMMERCIAL

## 2019-06-18 VITALS
WEIGHT: 246.06 LBS | HEIGHT: 66 IN | BODY MASS INDEX: 39.54 KG/M2 | SYSTOLIC BLOOD PRESSURE: 114 MMHG | DIASTOLIC BLOOD PRESSURE: 74 MMHG

## 2019-06-18 DIAGNOSIS — Z87.01 HISTORY OF PNEUMONIA: ICD-10-CM

## 2019-06-18 DIAGNOSIS — J31.0 CHRONIC RHINITIS: ICD-10-CM

## 2019-06-18 DIAGNOSIS — R05.9 COUGH: ICD-10-CM

## 2019-06-18 DIAGNOSIS — K21.9 GASTROESOPHAGEAL REFLUX DISEASE, ESOPHAGITIS PRESENCE NOT SPECIFIED: ICD-10-CM

## 2019-06-18 DIAGNOSIS — L50.1 IDIOPATHIC URTICARIA: Primary | ICD-10-CM

## 2019-06-18 DIAGNOSIS — T78.3XXA ANGIOEDEMA, INITIAL ENCOUNTER: ICD-10-CM

## 2019-06-18 PROCEDURE — 99999 PR PBB SHADOW E&M-EST. PATIENT-LVL III: ICD-10-PCS | Mod: PBBFAC,,, | Performed by: ALLERGY & IMMUNOLOGY

## 2019-06-18 PROCEDURE — 99999 PR PBB SHADOW E&M-EST. PATIENT-LVL III: CPT | Mod: PBBFAC,,, | Performed by: ALLERGY & IMMUNOLOGY

## 2019-06-18 PROCEDURE — 99244 PR OFFICE CONSULTATION,LEVEL IV: ICD-10-PCS | Mod: S$GLB,,, | Performed by: ALLERGY & IMMUNOLOGY

## 2019-06-18 PROCEDURE — 99244 OFF/OP CNSLTJ NEW/EST MOD 40: CPT | Mod: S$GLB,,, | Performed by: ALLERGY & IMMUNOLOGY

## 2019-06-18 NOTE — PROGRESS NOTES
Ann Godfrey is referred by Dr. Radha Mckeon for a consult regarding chronic recurrent urticaria and angioedema.  She was last seen by me on May 12, 2011 for an initial visit.  She did not return for follow-up until now.    She first developed chronic recurrent urticaria and angioedema when she was in high school in [place].  It lasted about two years before improving.  She then went to college at [employer] and continued to have lesions.    She moved to [place] in January 2010 and did have an increase in her lesions.    They have continued off and on until now.  She will go several months without having any.    She had does have intermittent in angioedema of her lips and eyelids.    She does occasionally have pain around the joint where she is having urticarial lesions.  The lesions do not hurt.  They do not bruise.    There is no specific association with any food, contact, or ingestion.  She does notice that heat increases her lesions as does pressure.  She has thought that eggs and processed foods increase her lesions.  She does eat eggs without any urticaria however.    She denies any thyroid disease.    She does have some increased rhinitis with sneezing and postnasal drip.  She does have hoarseness, throat clearing, sensation that something is in the back of the throat, difficulty swallowing with sometimes choking on water, and cough that is nonproductive.  Her symptoms are worse at night.    She takes Benadryl when needed.  She does not take any daily antihistamines.    She has had pneumonia 3 times the last in 2017 by chest x-ray.  She had an infiltrate in the right lower lobe.  She has had at least two other episodes of pneumonia since the first one in 2010.  She also hadone in [place] in 2016.    She used to smoke one pack cigarettes a day for 14 years.  She quit in 2010.    Three weeks ago she had increased rhinitis and cough.  She did have fever and chills.  She was in bed for five  days.  She had increased sputum and took antibiotics with improvement.  She also says that her urticaria improved after antibiotics.    She has had a recurrent cough and has been diagnosed with asthma in the past.  She does have an albuterol inhaler but does not take regularly.    She does have indigestion if she eats certain foods.  She does not currently taking medications for this.    She does have frequent headaches that have been associated with light sensitivity.    She has a tumor of her spinal cord that was diagnosed as benign in 2007 via biopsy.  In 2016 she was having some neurological symptoms including paresthesias and weakness and it was it radiated.    She has two children a girl almost five and 1-1/2 years old.  She did have PUPP with one of them.    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 Yes   Head or facial pain: Headaches   Please describe your head and/or facial pain, if applicable.  Migraines   Eyes: Sensitivity to light   Ears: Pressure   Nose: Nose bleeds, Post nasal drip, Runny nose   Throat: Sore throat, Hoarseness, Frequent clearing, Reflux/ heartburn   Sinuses: No symptoms   Do you have symptoms in your lungs?  Yes   Lungs: Cough, Wheezing, Asthma, Use of inhalers, Pneumonia   Is your cough productive of mucus and/or phlegm , if applicable? Yes   Have you ever has a tuberculosis skin test?  Yes   Have you ever had a lung-function test? Yes   Have you had a flu shot this year? No   Have you had the pneumonia vaccine?  Yes   Do you have any known problems with your immune system? Yes   Do you suspect you may have problems with your immune system? Yes   Do you have frequent infections? No   Do you have skin symptoms? Yes   Skin: Itching, Hives, Redness, Rash, Swelling, Bruising, Difficulty healing   When did your hives and/or swelling first begin? 2010   Please note the  frequency of hives and/or swelling in days, weeks OR months. 3-6 weeks at a time   Body location most commonly affected by hives: Arms, legs,   Body location most commonly affected by swelling: Lips   What are the substances, contacts, activity, foods, or drugs, which you think are related to hives or swelling? Processed food   Which medications have been helpful in controlling hives or swelling? Nothing   Are you taking this medication regularly? No   Have you associated the hives or swelling with any of the following? Sunlight, Temperature- hot, Exercise   Have you had any other associated symptoms with the hives or swelling such as: Other rashes, Chest tightness, Changes in skin, hair or nail texture, Weight gain or loss not associated with dietary changes, Fatigue or malaise not related to antihistamines   When did these symptoms first occur? 2010   Are they getting worse or better? Worse   How often do these symptoms occur? 2-3 months   When do these symptoms occur? Night   Do they occur year round? Yes   If there is any seasonal variation in your symptoms, when are they worse? N/a   Is there a particular time of the day or night when the symptoms are worse? Eveningd   Is there anything you have identified, which can cause symptoms or make them worse? (such as dust, grass, plant or animal products, mold, heat, cold, strong odors, exercise) Heat   Is there anything you have identified, which can make symptoms better?  No   What medications have you tried in the past to help control these symptoms?  Steroiods, topical cream   Please list all the vitamins or herbal medications you are taking. Mutlivitam   Please list all the other medications you are taking, including over-the-counter medications. N/a   Have you ever seen an allergist for these symptoms? No   Have you ever had skin tests? Yes   Have you ever had any other type of allergy testing? No   Have you ever had allergy shots? No   Do you have food  allergies? No   Do you have drug allergies? No   Do you have insect allergies? No   Do you have latex allergies? No   Constitution: Appetite change, Chills, Fatigue, Fever   Cardiovascular: Chest pain, Palpitations   Gastrointestinal: Nausea, vomiting, Abdominal bloating, Heartburn/ indigestion/ reflux   Genital/ urinary No symptoms   Musculoskeletal: Back pain, Joint pain, Bone pain, Neck pain   Endocrine: Headaches, Light-headedness, Numbness, Weakness   Hematologic: Adenopathy/ enlarged lymph nodes, Bruises/ bleeds easily   How long have you lived at your current address? 6 months   Has your residence ever had water or flood damage? No   Is there any evidence of mold in the house? No   Does your house have: Central air conditioning, Gas heat, HEPA filters   Does your bedroom have: Ceiling fan   What type of pillow do you have, for example feather, foam and fiberfill?  Cotton   Do you have pets? No   Does anyone in the house smoke? 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  Yes     Physical Examination:  General: Well-developed, well-nourished, no acute distress.  Head: No sinus tenderness.  Eyes: Conjunctivae:  No bulbar or palpebral conjunctival injection.  Ears: EAC's clear.  TM's clear.  No pre-auricular nodes.  Nose: Nasal Mucosa:  Pink.  Septum: No apparent deviation.  Turbinates:  No significant edema.  Polyps/Mass:  None visible.  Teeth/Gums:  No bleeding noted.  Oropharynx: No exudates.  Neck: Supple without thyromegaly. No cervical lymphadenopathy.    Respiratory/Chest: Effort: Good.  Auscultation:  Clear bilaterally.  Cardiovascular:  No murmur, rubs, or gallop heard.   GI:  Non-tender.  No masses.  No organomegaly.  Extremities:  No cyanosis, clubbing, or edema.  Skin: Good turgor.  No urticaria or angioedema.  Neuro/Psych: Oriented x 3.    Pictures are reviewed on her cell phone on June 18, 2019 and the lesions are consistent with  chronic urticaria.    Chest x-ray 09/15/2017:  Right lower lobe infiltrate concerning for pneumonia.  Mild central vascular congestion.    Laboratory 05/12/2011:  ImmunoCAP:  Negative.  Cornell and papaya negative.  CBC:  Normal.  CMP:  Alkaline phosphatase 54.  TSH:  0.910.  Peroxidase  Thyroglobulin antibody level:  Negative.  Thyroid peroxidase antibody level:  Negative.  Anti IgE receptor antibody level:  1.7.    Rheumatoid factor:  Negative.  ROGERIO:  Negative.  C4:  26.  Total complement:  117.    Spirometry 05/12/2011:  Normal.    Assessment:  1.  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  2.  Chronic rhinitis, consider allergic.  3.  Chronic cough, consider allergic.  4.  GERD.  5.  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  6.  History of recurrent pneumonia.  7.  Spinal cord benign tumor C1-C2.  8.  Recent URI, probably viral with bronchitis, resolved.    Recommendations:  1.  Laboratory as ordered.  2.  Start taking cetirizine 10 milligrams once a day.  May increase this to four a day if needed.  3.  Chest x-ray.  4.  Spirometry.  5.  Return to clinic in two weeks.

## 2019-06-18 NOTE — LETTER
June 18, 2019      Radha Mckeon MD  [address]  [address]  [facility] [address]           [facility]  [address]  [facility] [address]  Phone: [phone]  Fax: [phone]          Patient: Ann Godfrey   MR Number: [id number]   YOB: 1980   Date of Visit: 6/18/2019       Dear Dr. Radha Mckeon:    Thank you for referring Ann Godfrey to me for evaluation. Attached you will find relevant portions of my assessment and plan of care.    If you have questions, please do not hesitate to call me. I look forward to following Ann Godfrey along with you.    Sincerely,    DARCY Bassett III, MD    Enclosure  CC:  No Recipients    If you would like to receive this communication electronically, please contact [email or web address] or [phone] to request more information on [facility] access.    For providers and/or their staff who would like to refer a patient to [facility], please contact us through our one-stop-shop provider referral line, [facility] [place], at [phone].    If you feel you have received this communication in error or would no longer like to receive these types of communications, please e-mail [email or web address]

## 2019-07-01 ENCOUNTER — TELEPHONE (OUTPATIENT)
Dept: INTERNAL MEDICINE | Facility: CLINIC | Age: 39
End: 2019-07-01

## 2019-07-01 DIAGNOSIS — R68.81 EARLY SATIETY: Primary | ICD-10-CM

## 2019-09-02 PROBLEM — J01.90 ACUTE NON-RECURRENT SINUSITIS: Status: RESOLVED | Noted: 2019-01-28 | Resolved: 2019-09-02

## 2019-10-28 ENCOUNTER — PATIENT OUTREACH (OUTPATIENT)
Dept: ADMINISTRATIVE | Facility: OTHER | Age: 39
End: 2019-10-28

## 2019-10-30 ENCOUNTER — OFFICE VISIT (OUTPATIENT)
Dept: OBSTETRICS AND GYNECOLOGY | Facility: CLINIC | Age: 39
End: 2019-10-30
Payer: MEDICAID

## 2019-10-30 VITALS
DIASTOLIC BLOOD PRESSURE: 72 MMHG | BODY MASS INDEX: 38.16 KG/M2 | HEIGHT: 66 IN | WEIGHT: 237.44 LBS | SYSTOLIC BLOOD PRESSURE: 116 MMHG

## 2019-10-30 DIAGNOSIS — N91.2 ABSENT MENSES: Primary | ICD-10-CM

## 2019-10-30 LAB
B-HCG UR QL: POSITIVE
CTP QC/QA: YES

## 2019-10-30 PROCEDURE — 99999 PR PBB SHADOW E&M-EST. PATIENT-LVL III: CPT | Mod: PBBFAC,,, | Performed by: OBSTETRICS & GYNECOLOGY

## 2019-10-30 PROCEDURE — 99999 PR PBB SHADOW E&M-EST. PATIENT-LVL III: ICD-10-PCS | Mod: PBBFAC,,, | Performed by: OBSTETRICS & GYNECOLOGY

## 2019-10-30 PROCEDURE — 99213 OFFICE O/P EST LOW 20 MIN: CPT | Mod: PBBFAC,PO | Performed by: OBSTETRICS & GYNECOLOGY

## 2019-10-30 PROCEDURE — 87491 CHLMYD TRACH DNA AMP PROBE: CPT

## 2019-10-30 PROCEDURE — 99213 OFFICE O/P EST LOW 20 MIN: CPT | Mod: S$GLB,,, | Performed by: OBSTETRICS & GYNECOLOGY

## 2019-10-30 PROCEDURE — 87086 URINE CULTURE/COLONY COUNT: CPT

## 2019-10-30 PROCEDURE — 90471 IMMUNIZATION ADMIN: CPT | Mod: PBBFAC,PO

## 2019-10-30 PROCEDURE — 99213 PR OFFICE/OUTPT VISIT, EST, LEVL III, 20-29 MIN: ICD-10-PCS | Mod: S$GLB,,, | Performed by: OBSTETRICS & GYNECOLOGY

## 2019-10-30 PROCEDURE — 81025 URINE PREGNANCY TEST: CPT | Mod: PBBFAC,PO | Performed by: OBSTETRICS & GYNECOLOGY

## 2019-10-30 NOTE — PROGRESS NOTES
History & Physical  Gynecology      SUBJECTIVE:     Chief Complaint: Absent Menses       History of Present Illness:  Ann Godfrey is a 39 y.o. [id number] who presents with absent menses. LMP: 2019. Patient has regular cycles q28 days. Patient reports some nausea and vomiting however manageable. No other issues of note.     OB history: no history of gestational DM, gestational HTN or pre eclampsia    2017 - RLTCS, 9 lbs, F, [facility]    - ectopic pregnancy     - LTCS 2/2 CPD, 9 lbs 9 oz, F, [facility]    Gynecological history:   - Menses at age 13, regular cycles q28 days, lasting 4 days with heavy  Menstrual flow   - No history of STDs in the past   - Pap smear: UTD, last . No history of abnormal pap smears or excisional procedures   - No history of breast, ovarian, uterine or colon cancer     - Patient due for flu shot however, declines currently    Medications: none, taking prenatal vitamins   Allergies: none  Medical history:    - Asthma    - Diagnosed when she moved to [place], no issues recently    - Does not use albuterol inhaler or any other medications    - No history of hospitalizations, intubations or ICU admissions   - Depression    - Diagnosed in high school, does not currently take any medications    - Feels things are stable however stressful at times  Surgical history: CS x2 as above, appendectomy, posterior laminectomy      Review of patient's allergies indicates:  No Known Allergies    Past Medical History:   Diagnosis Date    Arthritis     spinal    Asthma     was on advair , resolved    Benign tumor of spinal cord     Depression     Ectopic pregnancy     GERD (gastroesophageal reflux disease)     Pre-diabetes     Urticaria      Past Surgical History:   Procedure Laterality Date    APPENDECTOMY       SECTION      x2    ECTOPIC PREGNANCY SURGERY      posteriorlaminectomy      For benign spinal tumor;      OB History        3     Para   2    Term   2       0    AB   1    Living   2       SAB   0    TAB   0    Ectopic   1    Multiple   0    Live Births   2               Family History   Problem Relation Age of Onset    Hypertension Mother     Hepatitis Father         cleared    Breast cancer Neg Hx     Colon cancer Neg Hx     Ovarian cancer Neg Hx     Stroke Neg Hx     Diabetes Neg Hx     Heart disease Neg Hx      Social History     Tobacco Use    Smoking status: Former Smoker     Packs/day: 1.00     Years: 13.00     Pack years: 13.00     Types: Cigarettes     Last attempt to quit: 10/20/2012     Years since quittin.0    Smokeless tobacco: Former User   Substance Use Topics    Alcohol use: No     Frequency: 2-4 times a month     Drinks per session: 1 or 2     Binge frequency: Never    Drug use: No       Current Outpatient Medications   Medication Sig    albuterol (PROAIR HFA) 90 mcg/actuation inhaler Inhale 2 puffs into the lungs every 6 (six) hours as needed for Wheezing or Shortness of Breath. Rescue     No current facility-administered medications for this visit.          Review of Systems:  Review of Systems   Constitutional: Negative for chills, diaphoresis and fever.   Respiratory: Negative for cough, shortness of breath and wheezing.    Cardiovascular: Negative for chest pain, palpitations and leg swelling.   Gastrointestinal: Positive for nausea (however well controlled) and vomiting (however well controlled). Negative for abdominal pain, constipation and diarrhea.   Genitourinary: Negative for dysuria, hematuria, pelvic pain, vaginal bleeding, vaginal discharge and vaginal dryness.   Musculoskeletal: Negative for back pain.   Integumentary:  Negative for nipple discharge.   Neurological: Negative for seizures, syncope and headaches.   Breast: Negative for asymmetry, breast self exam, lump and nipple discharge       OBJECTIVE:     Physical Exam:  Physical Exam   Constitutional: She is oriented to person,  place, and time. She appears well-developed and well-nourished. No distress.   HENT:   Head: Normocephalic and atraumatic.   Cardiovascular: Normal rate and regular rhythm.   Pulmonary/Chest: Effort normal. No respiratory distress.   Abdominal: Soft. She exhibits no distension. There is no tenderness.   Genitourinary:   Genitourinary Comments: SSE: normal external genitalia, normal appearing vaginal and cervical mucosa; cervix closed; no mucus or blood visible in the os  SVE: no CMT, uterus normal in size (approx 6 weeks), shape, consistency, no tenderness over bilateral adnexae; no masses palpable  GCCT       Neurological: She is alert and oriented to person, place, and time. No cranial nerve deficit.   Skin: Skin is warm and dry. Capillary refill takes less than 2 seconds. She is not diaphoretic.   Psychiatric: She has a normal mood and affect. Her behavior is normal. Judgment and thought content normal.   Vitals reviewed.        ASSESSMENT:       ICD-10-CM ICD-9-CM    1. Absent menses N91.2 626.0 POCT urine pregnancy          Plan:      1. Absent menses  - Patient presenting with +UPT at home; confirmed with positive POCT urine pregnancy test today in clinic  - Prenatal labs, ordered  - Pap smear, UTD 2017 (negative with negative HPV)   - Dating US, ordered  - Declines flu today     2. Asthma  - Diagnosed only when she moved to [place]  - Does not have albuterol inhaler or take any other medications  - No history of hospitalizations, intubations or ICU admissions  - Does have history of hospitalizations for pneumonia  - Continue to monitor    3. Elevated blood glucose  - Last HgA1c in 09/2019: 5.8  - Given this and BMI of 38, will have patient undergo OB glucose screen earlier, ordered for patient      Sparkle Lopez

## 2019-10-31 LAB
BACTERIA UR CULT: NORMAL
BACTERIA UR CULT: NORMAL
C TRACH DNA SPEC QL NAA+PROBE: NOT DETECTED
N GONORRHOEA DNA SPEC QL NAA+PROBE: NOT DETECTED

## 2019-11-07 ENCOUNTER — PROCEDURE VISIT (OUTPATIENT)
Dept: OBSTETRICS AND GYNECOLOGY | Facility: CLINIC | Age: 39
End: 2019-11-07
Payer: MEDICAID

## 2019-11-07 ENCOUNTER — LAB VISIT (OUTPATIENT)
Dept: LAB | Facility: OTHER | Age: 39
End: 2019-11-07
Payer: MEDICAID

## 2019-11-07 DIAGNOSIS — N91.2 ABSENT MENSES: ICD-10-CM

## 2019-11-07 DIAGNOSIS — Z36.89 ESTABLISH GESTATIONAL AGE, ULTRASOUND: ICD-10-CM

## 2019-11-07 LAB
ABO + RH BLD: NORMAL
BASOPHILS # BLD AUTO: 0.03 K/UL (ref 0–0.2)
BASOPHILS NFR BLD: 0.4 % (ref 0–1.9)
BLD GP AB SCN CELLS X3 SERPL QL: NORMAL
BLOOD GROUP ANTIBODIES SERPL: NORMAL
DIFFERENTIAL METHOD: ABNORMAL
EOSINOPHIL # BLD AUTO: 0.2 K/UL (ref 0–0.5)
EOSINOPHIL NFR BLD: 2 % (ref 0–8)
ERYTHROCYTE [DISTWIDTH] IN BLOOD BY AUTOMATED COUNT: 14.2 % (ref 11.5–14.5)
GLUCOSE SERPL-MCNC: 116 MG/DL (ref 70–140)
HCT VFR BLD AUTO: 40.1 % (ref 37–48.5)
HGB BLD-MCNC: 12.8 G/DL (ref 12–16)
IMM GRANULOCYTES # BLD AUTO: 0.04 K/UL (ref 0–0.04)
IMM GRANULOCYTES NFR BLD AUTO: 0.5 % (ref 0–0.5)
LYMPHOCYTES # BLD AUTO: 2 K/UL (ref 1–4.8)
LYMPHOCYTES NFR BLD: 23.4 % (ref 18–48)
MCH RBC QN AUTO: 26.8 PG (ref 27–31)
MCHC RBC AUTO-ENTMCNC: 31.9 G/DL (ref 32–36)
MCV RBC AUTO: 84 FL (ref 82–98)
MONOCYTES # BLD AUTO: 0.4 K/UL (ref 0.3–1)
MONOCYTES NFR BLD: 4.9 % (ref 4–15)
NEUTROPHILS # BLD AUTO: 5.9 K/UL (ref 1.8–7.7)
NEUTROPHILS NFR BLD: 68.8 % (ref 38–73)
NRBC BLD-RTO: 0 /100 WBC
PLATELET # BLD AUTO: 194 K/UL (ref 150–350)
PMV BLD AUTO: 12.1 FL (ref 9.2–12.9)
RBC # BLD AUTO: 4.77 M/UL (ref 4–5.4)
WBC # BLD AUTO: 8.51 K/UL (ref 3.9–12.7)

## 2019-11-07 PROCEDURE — 82950 GLUCOSE TEST: CPT

## 2019-11-07 PROCEDURE — 85025 COMPLETE CBC W/AUTO DIFF WBC: CPT

## 2019-11-07 PROCEDURE — 86886 COOMBS TEST INDIRECT TITER: CPT

## 2019-11-07 PROCEDURE — 86762 RUBELLA ANTIBODY: CPT

## 2019-11-07 PROCEDURE — 86703 HIV-1/HIV-2 1 RESULT ANTBDY: CPT

## 2019-11-07 PROCEDURE — 36415 COLL VENOUS BLD VENIPUNCTURE: CPT

## 2019-11-07 PROCEDURE — 86905 BLOOD TYPING RBC ANTIGENS: CPT

## 2019-11-07 PROCEDURE — 76801 OB US < 14 WKS SINGLE FETUS: CPT | Mod: 26,S$PBB,, | Performed by: OBSTETRICS & GYNECOLOGY

## 2019-11-07 PROCEDURE — 76801 OB US < 14 WKS SINGLE FETUS: CPT | Mod: PBBFAC | Performed by: OBSTETRICS & GYNECOLOGY

## 2019-11-07 PROCEDURE — 86901 BLOOD TYPING SEROLOGIC RH(D): CPT

## 2019-11-07 PROCEDURE — 86870 RBC ANTIBODY IDENTIFICATION: CPT

## 2019-11-07 PROCEDURE — 87340 HEPATITIS B SURFACE AG IA: CPT

## 2019-11-07 PROCEDURE — 86592 SYPHILIS TEST NON-TREP QUAL: CPT

## 2019-11-07 PROCEDURE — 76801 PR US, OB <14WKS, TRANSABD, SINGLE GESTATION: ICD-10-PCS | Mod: 26,S$PBB,, | Performed by: OBSTETRICS & GYNECOLOGY

## 2019-11-08 LAB
HBV SURFACE AG SERPL QL IA: NEGATIVE
HIV 1+2 AB+HIV1 P24 AG SERPL QL IA: NEGATIVE
RUBV IGG SER-ACNC: 32.9 IU/ML
RUBV IGG SER-IMP: REACTIVE

## 2019-11-09 LAB — RPR SER QL: NORMAL

## 2019-11-18 ENCOUNTER — PATIENT MESSAGE (OUTPATIENT)
Dept: OBSTETRICS AND GYNECOLOGY | Facility: CLINIC | Age: 39
End: 2019-11-18

## 2019-11-18 ENCOUNTER — PATIENT MESSAGE (OUTPATIENT)
Dept: INTERNAL MEDICINE | Facility: CLINIC | Age: 39
End: 2019-11-18

## 2019-11-19 NOTE — PROGRESS NOTES
Subjective:       Patient ID: Ann Godfrey is a 39 y.o. female who  has a past medical history of Arthritis, Asthma, Benign tumor of spinal cord, Depression, Ectopic pregnancy, GERD (gastroesophageal reflux disease), Pre-diabetes, and Urticaria.    Chief Complaint: Hearing Problem     History was obtained from the patient and supplemented through chart review who accompanied the patient.  Saw OBGYN for +UPT, prenatal care.    Children are 4 years old and 1 year old.  Is pregnant.    Cough   This is a new problem. The current episode started in the past 7 days (5 days). The problem has been waxing and waning. The problem occurs hourly. The cough is productive of sputum. Associated symptoms include ear congestion, myalgias, postnasal drip and wheezing. Pertinent negatives include no chest pain, eye redness, fever, heartburn, hemoptysis, nasal congestion, rash, rhinorrhea, sore throat, sweats or weight loss. The symptoms are aggravated by cold air, lying down and stress. She has tried rest (Claritin) for the symptoms. The treatment provided no relief. Her past medical history is significant for asthma, bronchitis and pneumonia. There is no history of bronchiectasis, COPD, emphysema or environmental allergies.     Asthma:    When she first moved to [place].  Hasn't used an inhaler in 6 years.  Not worsened by exercise.  Not progressively worsening.      Decreased, muffled hearing in her left ear. + L tinnitus.  Slight wheezing at night, chest tightness.  Productive cough without trouble sleeping at night.  Some difficulty expectorating sputum.  Some VILLA, cough with exertion.  Hasn't needed to use her inhaler.    She had the flu vaccine.  Her child has the flu.    AR:   Nasal congestion, post-nasal drip, sore throat and dry cough.  Symptoms are worse when she travels to a colder area and has worsened in [place].  Symptoms are not worse when she is inside or outside.  The patient does not have pets;  "does have a small carpet in the bedroom.  The patient does not smoke.            Not addressed today.  Chronic urticaria:    Experiences it when she eats processed foods, heavy meat, heat, that improved with dietary changes.  No association with dairy.  Had allergy testing years ago and was considering autoimmune urticaria.    Tends to have urticaria during illnesses.  Has a diffuse erythemaous rash everywhere with pruritus as if her "body is on fire".  Feels it under her nails which is uncomfortable.  Improves during the day.  Saw allergy; likely idiopathic urticaria.  Rec Zyrtec 10 daily, up to 4/day. CXR, spirometry ordered.  Currently no rash.     Worse with illness, heat, but currently no issues.  Following with allergy.  Rec Zyrtec 10 daily and up to 4 times a day.     Pre diabetes:   Diet controlled.  A1c 5.8.   Hemoglobin A1C   Date Value Ref Range Status   01/29/2019 5.8 (H) 4.0 - 5.6 % Final     Comment:     ADA Screening Guidelines:  5.7-6.4%  Consistent with prediabetes  >or=6.5%  Consistent with diabetes  High levels of fetal hemoglobin interfere with the HbA1C  assay. Heterozygous hemoglobin variants (HbS, HgC, etc)do  not significantly interfere with this assay.   However, presence of multiple variants may affect accuracy.     11/06/2012 5.8 4.0 - 6.2 % Final     Obesity:    BMI 38.  Started doing cardio 40 minutes a day.  Just started going to the gym at 4:00 a.m. before work.  Cooking meals at home.  Eat lots of greens, doesn't eat much meat.  Admits to weight gain since moving to [place].  Has improved diet.  Started working out at the gym.       Acid reflux:  Not required PPI.  Improved after she started to eat healthier.    Has not required PPI.  Improvement after dietary changes.      Early satiety, nausea:  Had spinal surgery 10 years ago and since then had difficulty defecating and required retraining.  Also with nausea and early satiety with eating.  Was recommended to get an EGD but " "never had this done.  GI issues s/p spinal tumor resection. 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  Underwent Radiation 2 years ago due to growth.  Has MRI annually.  Has not seen [facility] yet because copay for MRI was $2500.  Was seeing [facility].   s/p resection.  Chronic left-sided numbness and weakness. Underwent radiation 2 years ago.  Has annual MRIs; provided # for billing for cost. Will see [facility].    H/o Menorrhagia:    Reports regular cycles. Uses 18 pads per day, has to change her pads every 45 minutes - 1 hour.  Periods last 3 days.  Denies CP, SOB, VILLA.  Does get lightheadedness, fatigued during her periods.  Denies PICA syndrome. The patient denies hematochezia, melena, hematuria.    Anemia resolved and iron panel normal.  Follows with OBGYN.   Iron panel normal.  Currently pregnant.    L hemorrhagic ovarian cyst:    2.4 cm.  Seen on TVUS due to pelvic pain. 4 cm R ovarian area suggestive of cyst. Has seen gyn.  Still with mild pelvic pain "pulling".  Follows with OBGYN    Review of Systems   Constitutional: Negative for fever, unexpected weight change and weight loss.   HENT: Positive for postnasal drip. Negative for rhinorrhea, sore throat and voice change.    Eyes: Negative for discharge and redness.   Respiratory: Positive for cough and wheezing. Negative for hemoptysis.    Cardiovascular: Negative for chest pain and palpitations.   Gastrointestinal: Positive for nausea. Negative for constipation and heartburn.   Genitourinary: Negative for dysuria and menstrual problem.   Musculoskeletal: Positive for myalgias. Negative for gait problem.        Has chronic myalgia   Skin: Negative for rash and wound.   Allergic/Immunologic: Negative for environmental allergies.   Neurological: Positive for numbness. Negative for weakness.   Hematological: Negative for adenopathy.   Psychiatric/Behavioral: " "Negative for confusion. The patient is not nervous/anxious.        I personally reviewed Past Medical History, Past Surgical History, Social History, and Family History.    Objective:      Vitals:    11/20/19 0756   BP: 112/72   Pulse: 80   Temp: 98.5 °F (36.9 °C)   TempSrc: Oral   SpO2: 98%   Weight: 107.8 kg (237 lb 10.5 oz)   Height: 5' 6" (1.676 m)      Physical Exam   Constitutional: She appears well-developed and well-nourished. No distress.   HENT:   Head: Normocephalic and atraumatic.   Right Ear: Tympanic membrane and ear canal normal. No drainage.   Left Ear: Tympanic membrane and ear canal normal. No drainage.   Nose: Mucosal edema present. No rhinorrhea. Right sinus exhibits no maxillary sinus tenderness and no frontal sinus tenderness. Left sinus exhibits no maxillary sinus tenderness and no frontal sinus tenderness.   Mouth/Throat: Uvula is midline and mucous membranes are normal. Posterior oropharyngeal erythema present. No oropharyngeal exudate or posterior oropharyngeal edema.   Eyes: Pupils are equal, round, and reactive to light. EOM are normal. Right eye exhibits no discharge. Left eye exhibits no discharge. Right conjunctiva is not injected. Left conjunctiva is not injected. No scleral icterus.   Neck: Neck supple. No tracheal deviation present. No thyromegaly present.   Cardiovascular: Normal rate, regular rhythm, normal heart sounds and intact distal pulses.   No murmur heard.  Pulmonary/Chest: Effort normal. No stridor. No respiratory distress. She has wheezes.   Speaking in complete sentences.  No egophony, dullness to percussion.  Good air movement in all lung fields.   Abdominal: Soft. Bowel sounds are normal. There is no tenderness.   Musculoskeletal: She exhibits no edema, tenderness or deformity.   Lymphadenopathy:     She has no cervical adenopathy.   Neurological: She is alert. Gait normal.   Skin: Skin is warm and dry. Capillary refill takes less than 2 seconds. No rash noted. She is " not diaphoretic. No erythema.   Psychiatric: She has a normal mood and affect. Her behavior is normal.         Lab Results   Component Value Date    WBC 8.51 11/07/2019    HGB 12.8 11/07/2019    HCT 40.1 11/07/2019     11/07/2019    CHOL 204 (H) 01/29/2019    TRIG 105 01/29/2019    HDL 49 01/29/2019     (H) 01/29/2019    AST 62 (H) 01/29/2019     01/29/2019    K 4.0 01/29/2019     01/29/2019    CREATININE 0.7 01/29/2019    BUN 8 01/29/2019    CO2 22 (L) 01/29/2019    TSH 1.673 11/06/2012    HGBA1C 5.8 (H) 01/29/2019       The ASCVD Risk score (Myriam SANTIAGO Jr., et al., 2013) failed to calculate for the following reasons:    The 2013 ASCVD risk score is only valid for ages 40 to 79    (Imaging have been independently reviewed)  L hemorrhagic ovarian cyst:  2.4 cm.  Seen on TVUS due to pelvic pain. 4 cm R ovarian area suggestive of cyst.     Assessment:       1. Mild intermittent asthma with acute exacerbation    2. Allergic rhinitis due to other allergic trigger, unspecified seasonality          Plan:       Ann was seen today for hearing problem.    Diagnoses and all orders for this visit:    Mild intermittent asthma with acute exacerbation  Comments:  New. Treat as asthma exacerbation with prednisone for 5 days.  Recommend antihistamine, Mucinex.  Asthma has been otherwise well controlled.  Orders:  -     predniSONE (DELTASONE) 20 MG tablet; Take 2 tablets (40 mg total) by mouth once daily. for 5 days  -     guaiFENesin (MUCINEX) 600 mg 12 hr tablet; Take 1 tablet (600 mg total) by mouth 2 (two) times daily as needed for Congestion.    Allergic rhinitis due to other allergic trigger, unspecified seasonality  Comments:  Could be contributing to asthma.  Worse in the winter months.  Rec antihistamine as above. Will schedule ENT if decreased hearing/tinnitus persistent 12 wks.  Orders:  -     Ambulatory Referral to ENT         Notification of Lab Results: [facility]    Side effects of  medication(s) were discussed in detail and patient voiced understanding.  Patient will call back for any issues or complications.     RT  for annual.

## 2019-11-20 ENCOUNTER — OFFICE VISIT (OUTPATIENT)
Dept: INTERNAL MEDICINE | Facility: CLINIC | Age: 39
End: 2019-11-20
Payer: MEDICAID

## 2019-11-20 VITALS
OXYGEN SATURATION: 98 % | SYSTOLIC BLOOD PRESSURE: 112 MMHG | TEMPERATURE: 99 F | BODY MASS INDEX: 38.19 KG/M2 | HEIGHT: 66 IN | WEIGHT: 237.63 LBS | DIASTOLIC BLOOD PRESSURE: 72 MMHG | HEART RATE: 80 BPM

## 2019-11-20 DIAGNOSIS — J30.89 ALLERGIC RHINITIS DUE TO OTHER ALLERGIC TRIGGER, UNSPECIFIED SEASONALITY: ICD-10-CM

## 2019-11-20 DIAGNOSIS — J45.21 MILD INTERMITTENT ASTHMA WITH ACUTE EXACERBATION: Primary | ICD-10-CM

## 2019-11-20 PROCEDURE — 99999 PR PBB SHADOW E&M-EST. PATIENT-LVL IV: CPT | Mod: PBBFAC,,, | Performed by: INTERNAL MEDICINE

## 2019-11-20 PROCEDURE — 99215 OFFICE O/P EST HI 40 MIN: CPT | Mod: S$PBB,,, | Performed by: INTERNAL MEDICINE

## 2019-11-20 PROCEDURE — 99215 PR OFFICE/OUTPT VISIT, EST, LEVL V, 40-54 MIN: ICD-10-PCS | Mod: S$PBB,,, | Performed by: INTERNAL MEDICINE

## 2019-11-20 PROCEDURE — 99214 OFFICE O/P EST MOD 30 MIN: CPT | Mod: PBBFAC | Performed by: INTERNAL MEDICINE

## 2019-11-20 PROCEDURE — 99999 PR PBB SHADOW E&M-EST. PATIENT-LVL IV: ICD-10-PCS | Mod: PBBFAC,,, | Performed by: INTERNAL MEDICINE

## 2019-11-20 RX ORDER — PREDNISONE 20 MG/1
40 TABLET ORAL DAILY
Qty: 10 TABLET | Refills: 0 | Status: SHIPPED | OUTPATIENT
Start: 2019-11-20 | End: 2019-11-25

## 2019-11-20 RX ORDER — GUAIFENESIN 600 MG/1
600 TABLET, EXTENDED RELEASE ORAL 2 TIMES DAILY PRN
Qty: 20 TABLET | Refills: 0 | Status: SHIPPED | OUTPATIENT
Start: 2019-11-20 | End: 2019-11-30

## 2019-11-20 NOTE — PATIENT INSTRUCTIONS
I recommend plenty of rest and hydration.  Tylenol and NSAIDs, such as ibuprofen, Motrin, naproxen can be helpful for sore throat, headache, ear pain, muscle and joint pain, sneezing, fatigue.  Chloroseptic spray, lozenges can also soothe a sore throat.  Over-the-counter antihistamines (Allegra, Claritin, Zyrtec) for runny nose and decongestants (Sudafed) can also be helpful.    Avoid D, DM.  Nasal saline once to twice a day.  Hand washing can help prevent the spread of respiratory illnesses, especially from younger children.

## 2019-11-26 ENCOUNTER — ROUTINE PRENATAL (OUTPATIENT)
Dept: OBSTETRICS AND GYNECOLOGY | Facility: CLINIC | Age: 39
End: 2019-11-26
Payer: MEDICAID

## 2019-11-26 VITALS
SYSTOLIC BLOOD PRESSURE: 110 MMHG | BODY MASS INDEX: 38.43 KG/M2 | WEIGHT: 238.13 LBS | DIASTOLIC BLOOD PRESSURE: 70 MMHG

## 2019-11-26 DIAGNOSIS — O09.91 SUPERVISION OF HIGH RISK PREGNANCY IN FIRST TRIMESTER: ICD-10-CM

## 2019-11-26 DIAGNOSIS — O99.211 OBESITY AFFECTING PREGNANCY IN FIRST TRIMESTER: ICD-10-CM

## 2019-11-26 DIAGNOSIS — O99.519 MATERNAL ASTHMA COMPLICATING PREGNANCY: ICD-10-CM

## 2019-11-26 DIAGNOSIS — J45.909 MATERNAL ASTHMA COMPLICATING PREGNANCY: ICD-10-CM

## 2019-11-26 DIAGNOSIS — R76.0 ABNORMAL ANTIBODY TITER: ICD-10-CM

## 2019-11-26 DIAGNOSIS — O09.521 MULTIGRAVIDA OF ADVANCED MATERNAL AGE IN FIRST TRIMESTER: ICD-10-CM

## 2019-11-26 PROBLEM — J30.9 ALLERGIC RHINITIS: Status: RESOLVED | Noted: 2019-01-28 | Resolved: 2019-11-26

## 2019-11-26 PROBLEM — O99.210 OBESITY COMPLICATING PREGNANCY: Status: ACTIVE | Noted: 2019-11-26

## 2019-11-26 PROBLEM — O09.529 ADVANCED MATERNAL AGE IN MULTIGRAVIDA: Status: ACTIVE | Noted: 2019-11-26

## 2019-11-26 PROBLEM — O09.90 PREGNANCY, SUPERVISION, HIGH-RISK: Status: ACTIVE | Noted: 2019-11-26

## 2019-11-26 PROCEDURE — 99213 OFFICE O/P EST LOW 20 MIN: CPT | Mod: PBBFAC,TH,PO | Performed by: OBSTETRICS & GYNECOLOGY

## 2019-11-26 PROCEDURE — 99213 OFFICE O/P EST LOW 20 MIN: CPT | Mod: TH,S$PBB,, | Performed by: OBSTETRICS & GYNECOLOGY

## 2019-11-26 PROCEDURE — 99999 PR PBB SHADOW E&M-EST. PATIENT-LVL III: ICD-10-PCS | Mod: PBBFAC,,, | Performed by: OBSTETRICS & GYNECOLOGY

## 2019-11-26 PROCEDURE — 99213 PR OFFICE/OUTPT VISIT, EST, LEVL III, 20-29 MIN: ICD-10-PCS | Mod: TH,S$PBB,, | Performed by: OBSTETRICS & GYNECOLOGY

## 2019-11-26 PROCEDURE — 99999 PR PBB SHADOW E&M-EST. PATIENT-LVL III: CPT | Mod: PBBFAC,,, | Performed by: OBSTETRICS & GYNECOLOGY

## 2019-11-26 NOTE — PROGRESS NOTES
Complaints today:none  Denies vb, lof, pain.  +uri    /70   Wt 108 kg (238 lb 1.6 oz)   LMP 2019 (Exact Date)   BMI 38.43 kg/m²     39 y.o., at 11w5d by Estimated Date of Delivery: 20  Patient Active Problem List   Diagnosis    Back pain, lumbosacral    Benign tumor of spinal cord    Undergoing workup for MS    GERD (gastroesophageal reflux disease)    Depression    Asthma    Urticaria    Obesity (BMI 35.0-39.9 without comorbidity)    Pre-diabetes    Early satiety    Cyst of left ovary    Pregnancy, supervision, high-risk    Advanced maternal age in multigravida    Maternal asthma complicating pregnancy    Obesity complicating pregnancy    Abnormal antibody titer     OB History    Para Term  AB Living   4 2 2 0 1 2   SAB TAB Ectopic Multiple Live Births   0 0 1 0 2      # Outcome Date GA Lbr King/2nd Weight Sex Delivery Anes PTL Lv   4 Current            3 Term 10/10/17 39w0d  4.451 kg (9 lb 13 oz) F CS-LTranv Spinal, EPI N GAUDENCIO   2 Ectopic      ECTOPIC      1 Term 14 40w5d  4.326 kg (9 lb 8.6 oz) F CS-LTranv EPI N GAUDENCIO       Dating reviewed    Allergies and problem list reviewed and updated    Medical and surgical history reviewed    Prenatal labs reviewed and updated    Physical Exam:  ABD: soft, gravid, nontender,     Assessment:  Ann was seen today for routine prenatal visit.    Diagnoses and all orders for this visit:    Supervision of high risk pregnancy in first trimester  -     US MFM Procedure ([facility]); Future    Multigravida of advanced maternal age in first trimester    Maternal asthma complicating pregnancy    Obesity affecting pregnancy in first trimester    Abnormal antibody titer  -     Antibody titer; Future         Plan:   follow up labs   follow up 4 Weeks, bleeding/pain

## 2019-11-27 ENCOUNTER — LAB VISIT (OUTPATIENT)
Dept: LAB | Facility: OTHER | Age: 39
End: 2019-11-27
Attending: OBSTETRICS & GYNECOLOGY
Payer: MEDICAID

## 2019-11-27 DIAGNOSIS — R76.0 ABNORMAL ANTIBODY TITER: ICD-10-CM

## 2019-11-27 LAB
ABO + RH BLD: NORMAL
BLD GP AB SCN CELLS X3 SERPL QL: NORMAL
BLD GP AB SCN TITR SERPL: 2 {TITER}
BLOOD GROUP ANTIBODIES SERPL: NORMAL

## 2019-11-27 PROCEDURE — 86886 COOMBS TEST INDIRECT TITER: CPT

## 2019-11-27 PROCEDURE — 86901 BLOOD TYPING SEROLOGIC RH(D): CPT

## 2019-11-27 PROCEDURE — 86850 RBC ANTIBODY SCREEN: CPT

## 2019-11-27 PROCEDURE — 86870 RBC ANTIBODY IDENTIFICATION: CPT

## 2019-11-27 PROCEDURE — 86900 BLOOD TYPING SEROLOGIC ABO: CPT

## 2019-11-27 PROCEDURE — 36415 COLL VENOUS BLD VENIPUNCTURE: CPT

## 2020-01-02 ENCOUNTER — ROUTINE PRENATAL (OUTPATIENT)
Dept: OBSTETRICS AND GYNECOLOGY | Facility: CLINIC | Age: 40
End: 2020-01-02
Payer: MEDICAID

## 2020-01-02 VITALS
DIASTOLIC BLOOD PRESSURE: 80 MMHG | BODY MASS INDEX: 38.89 KG/M2 | SYSTOLIC BLOOD PRESSURE: 134 MMHG | WEIGHT: 240.94 LBS

## 2020-01-02 DIAGNOSIS — J45.909 MATERNAL ASTHMA COMPLICATING PREGNANCY: ICD-10-CM

## 2020-01-02 DIAGNOSIS — O99.212 OBESITY AFFECTING PREGNANCY IN SECOND TRIMESTER: ICD-10-CM

## 2020-01-02 DIAGNOSIS — O09.522 MULTIGRAVIDA OF ADVANCED MATERNAL AGE IN SECOND TRIMESTER: ICD-10-CM

## 2020-01-02 DIAGNOSIS — R76.0 ABNORMAL ANTIBODY TITER: ICD-10-CM

## 2020-01-02 DIAGNOSIS — O99.519 MATERNAL ASTHMA COMPLICATING PREGNANCY: ICD-10-CM

## 2020-01-02 DIAGNOSIS — O09.92 SUPERVISION OF HIGH RISK PREGNANCY IN SECOND TRIMESTER: Primary | ICD-10-CM

## 2020-01-02 PROCEDURE — 99999 PR PBB SHADOW E&M-EST. PATIENT-LVL III: ICD-10-PCS | Mod: PBBFAC,,, | Performed by: OBSTETRICS & GYNECOLOGY

## 2020-01-02 PROCEDURE — 99213 OFFICE O/P EST LOW 20 MIN: CPT | Mod: TH,S$PBB,, | Performed by: OBSTETRICS & GYNECOLOGY

## 2020-01-02 PROCEDURE — 99999 PR PBB SHADOW E&M-EST. PATIENT-LVL III: CPT | Mod: PBBFAC,,, | Performed by: OBSTETRICS & GYNECOLOGY

## 2020-01-02 PROCEDURE — 99213 PR OFFICE/OUTPT VISIT, EST, LEVL III, 20-29 MIN: ICD-10-PCS | Mod: TH,S$PBB,, | Performed by: OBSTETRICS & GYNECOLOGY

## 2020-01-02 PROCEDURE — 99213 OFFICE O/P EST LOW 20 MIN: CPT | Mod: PBBFAC,TH,PO | Performed by: OBSTETRICS & GYNECOLOGY

## 2020-01-02 NOTE — PROGRESS NOTES
Ann Godfrey is a 39 y.o. [id number] at 17w0d presents for routine prenatal visit.  This IUP is complicated by AMA.  Patient denies contractions, denies vaginal bleeding, denies LOF.   Fetal Movement: not yet present.   She has no complaints today.      /80   Wt 109.3 kg (240 lb 15.4 oz)   LMP 2019 (Exact Date)   BMI 38.89 kg/m²     39 y.o., at 17w0d by Estimated Date of Delivery: 20  Patient Active Problem List   Diagnosis    Back pain, lumbosacral    Benign tumor of spinal cord    Undergoing workup for MS    GERD (gastroesophageal reflux disease)    Depression    Asthma    Urticaria    Obesity (BMI 35.0-39.9 without comorbidity)    Pre-diabetes    Early satiety    Cyst of left ovary    Pregnancy, supervision, high-risk - flu    Advanced maternal age in multigravida    Maternal asthma complicating pregnancy    Obesity complicating pregnancy    Abnormal antibody titer - anti-E 1:2     OB History    Para Term  AB Living   4 2 2 0 1 2   SAB TAB Ectopic Multiple Live Births   0 0 1 0 2      # Outcome Date GA Lbr King/2nd Weight Sex Delivery Anes PTL Lv   4 Current            3 Term 10/10/17 39w0d  4.451 kg (9 lb 13 oz) F CS-LTranv Spinal, EPI N GAUDENCIO   2 Ectopic 2015     ECTOPIC      1 Term 14 40w5d  4.326 kg (9 lb 8.6 oz) F CS-LTranv EPI N GAUDENCIO       Dating reviewed    Allergies and problem list reviewed and updated    Medical and surgical history reviewed    Prenatal labs reviewed and updated    Physical Exam:  ABD: soft, gravid, nontender  FH: below umbilicus  FHT: 153 bpm    Ann was seen today for routine prenatal visit.    Diagnoses and all orders for this visit:    Supervision of high risk pregnancy in second trimester    Multigravida of advanced maternal age in second trimester    Maternal asthma complicating pregnancy    Obesity affecting pregnancy in second trimester    Abnormal antibody titer - anti-E 1:2        Assessment/Plan:  1. IUP @ 17  weeks  - No complaints today  - Anatomy scan ordered  - Has had flu shot already this pregnancy      Follow up 4 Weeks, labor precautions      Candy Pope M.D.  [facility] PGY1

## 2020-01-22 ENCOUNTER — PROCEDURE VISIT (OUTPATIENT)
Dept: MATERNAL FETAL MEDICINE | Facility: CLINIC | Age: 40
End: 2020-01-22
Payer: MEDICAID

## 2020-01-22 DIAGNOSIS — O09.522 MULTIGRAVIDA OF ADVANCED MATERNAL AGE IN SECOND TRIMESTER: ICD-10-CM

## 2020-01-22 DIAGNOSIS — Z36.89 ENCOUNTER FOR ULTRASOUND TO CHECK FETAL GROWTH: Primary | ICD-10-CM

## 2020-01-22 DIAGNOSIS — Z36.89 ENCOUNTER FOR FETAL ANATOMIC SURVEY: ICD-10-CM

## 2020-01-22 DIAGNOSIS — O09.91 SUPERVISION OF HIGH RISK PREGNANCY IN FIRST TRIMESTER: ICD-10-CM

## 2020-01-22 PROCEDURE — 76811 PR US, OB FETAL EVAL & EXAM, TRANSABDOM,FIRST GESTATION: ICD-10-PCS | Mod: 26,S$PBB,, | Performed by: OBSTETRICS & GYNECOLOGY

## 2020-01-22 PROCEDURE — 76811 OB US DETAILED SNGL FETUS: CPT | Mod: 26,S$PBB,, | Performed by: OBSTETRICS & GYNECOLOGY

## 2020-01-22 PROCEDURE — 76811 OB US DETAILED SNGL FETUS: CPT | Mod: PBBFAC | Performed by: OBSTETRICS & GYNECOLOGY

## 2020-01-30 ENCOUNTER — ROUTINE PRENATAL (OUTPATIENT)
Dept: OBSTETRICS AND GYNECOLOGY | Facility: CLINIC | Age: 40
End: 2020-01-30
Payer: MEDICAID

## 2020-01-30 VITALS
DIASTOLIC BLOOD PRESSURE: 78 MMHG | SYSTOLIC BLOOD PRESSURE: 118 MMHG | BODY MASS INDEX: 39.96 KG/M2 | WEIGHT: 247.56 LBS

## 2020-01-30 DIAGNOSIS — O99.212 OBESITY AFFECTING PREGNANCY IN SECOND TRIMESTER: ICD-10-CM

## 2020-01-30 DIAGNOSIS — O09.92 SUPERVISION OF HIGH RISK PREGNANCY IN SECOND TRIMESTER: Primary | ICD-10-CM

## 2020-01-30 DIAGNOSIS — O09.522 MULTIGRAVIDA OF ADVANCED MATERNAL AGE IN SECOND TRIMESTER: ICD-10-CM

## 2020-01-30 DIAGNOSIS — O99.519 MATERNAL ASTHMA COMPLICATING PREGNANCY: ICD-10-CM

## 2020-01-30 DIAGNOSIS — J45.909 MATERNAL ASTHMA COMPLICATING PREGNANCY: ICD-10-CM

## 2020-01-30 DIAGNOSIS — R76.0 ABNORMAL ANTIBODY TITER: ICD-10-CM

## 2020-01-30 DIAGNOSIS — O09.91 SUPERVISION OF HIGH RISK PREGNANCY IN FIRST TRIMESTER: ICD-10-CM

## 2020-01-30 PROCEDURE — 99213 PR OFFICE/OUTPT VISIT, EST, LEVL III, 20-29 MIN: ICD-10-PCS | Mod: TH,S$PBB,, | Performed by: OBSTETRICS & GYNECOLOGY

## 2020-01-30 PROCEDURE — 99213 OFFICE O/P EST LOW 20 MIN: CPT | Mod: TH,S$PBB,, | Performed by: OBSTETRICS & GYNECOLOGY

## 2020-01-30 PROCEDURE — 99999 PR PBB SHADOW E&M-EST. PATIENT-LVL III: ICD-10-PCS | Mod: PBBFAC,,, | Performed by: OBSTETRICS & GYNECOLOGY

## 2020-01-30 PROCEDURE — 99999 PR PBB SHADOW E&M-EST. PATIENT-LVL III: CPT | Mod: PBBFAC,,, | Performed by: OBSTETRICS & GYNECOLOGY

## 2020-01-30 PROCEDURE — 99213 OFFICE O/P EST LOW 20 MIN: CPT | Mod: PBBFAC,TH,PO | Performed by: OBSTETRICS & GYNECOLOGY

## 2020-01-30 NOTE — PROGRESS NOTES
Complaints today: mild nausea, HA    Pt denies VB, LOF, ctx. Reports normal FM. Pt reports intermittent nausea and occasional HA, declines meds.    /78   Wt 112.3 kg (247 lb 9.2 oz)   LMP 2019 (Exact Date)   BMI 39.96 kg/m²     39 y.o., at 21w0d by Estimated Date of Delivery: 20  Patient Active Problem List   Diagnosis    Back pain, lumbosacral    Benign tumor of spinal cord    Undergoing workup for MS    GERD (gastroesophageal reflux disease)    Depression    Asthma    Urticaria    Obesity (BMI 35.0-39.9 without comorbidity)    Pre-diabetes    Early satiety    Cyst of left ovary    Pregnancy, supervision, high-risk - flu    Advanced maternal age in multigravida    Maternal asthma complicating pregnancy    Obesity complicating pregnancy    Abnormal antibody titer - anti-E 1:2     OB History    Para Term  AB Living   4 2 2 0 1 2   SAB TAB Ectopic Multiple Live Births   0 0 1 0 2      # Outcome Date GA Lbr King/2nd Weight Sex Delivery Anes PTL Lv   4 Current            3 Term 10/10/17 39w0d  4.451 kg (9 lb 13 oz) F CS-LTranv Spinal, EPI N GAUDENCIO   2 Ectopic      ECTOPIC      1 Term 14 40w5d  4.326 kg (9 lb 8.6 oz) F CS-LTranv EPI N GAUDENCIO       Dating reviewed    Allergies and problem list reviewed and updated    Medical and surgical history reviewed    Prenatal labs reviewed and updated    Physical Exam:  ABD: soft, gravid, nontender, FHTs confirmed    Assessment:  Pt is a 39 y.o.  at 21w0d who presents for routine OB follow up    Plan:   1. Supervision of high risk pregnancy in second trimester      2. Multigravida of advanced maternal age in second trimester      3. Maternal asthma complicating pregnancy  - well controlled    4. Obesity affecting pregnancy in second trimester      5. Supervision of high risk pregnancy in first trimester      6. Abnormal antibody titer - anti-E 1:2  - anti E 1:2 on , needs recheck  - Antibody titer; Future     follow  up 4 Weeks, kick counts, labor precautions     Chaparrita Hoff MD   OBGYN, PGY-2

## 2020-01-31 ENCOUNTER — LAB VISIT (OUTPATIENT)
Dept: LAB | Facility: OTHER | Age: 40
End: 2020-01-31
Attending: STUDENT IN AN ORGANIZED HEALTH CARE EDUCATION/TRAINING PROGRAM
Payer: MEDICAID

## 2020-01-31 DIAGNOSIS — R76.0 ABNORMAL ANTIBODY TITER: ICD-10-CM

## 2020-01-31 LAB
BLD GP AB SCN CELLS X3 SERPL QL: NORMAL
BLOOD GROUP ANTIBODIES SERPL: NORMAL

## 2020-01-31 PROCEDURE — 86870 RBC ANTIBODY IDENTIFICATION: CPT

## 2020-01-31 PROCEDURE — 36415 COLL VENOUS BLD VENIPUNCTURE: CPT

## 2020-01-31 PROCEDURE — 86850 RBC ANTIBODY SCREEN: CPT

## 2020-02-17 NOTE — H&P
[facility]  Obstetrics  History & Physical    Patient Name: Ann Godfrey  MRN: [id number]  Admission Date: 10/10/2017  Primary Care Provider: Dunia Schafer MD    Subjective:     Principal Problem:History of  delivery affecting pregnancy    History of Present Illness:   Ann Godfrey is a 37 y.o.  female with IUP at 39w0d weeks gestation who is admitted for scheduled  Section secondary to prior c/s x 1.     This IUP is complicated by history of asthma, maternal obesity, and suspected fetal macrosomia w/ EFW >99% (4832g).  Patient denies contractions, denies vaginal bleeding, denies LOF.   Fetal Movement: normal.         Obstetric History       T1      L1     SAB0   TAB0   Ectopic1   Multiple0   Live Births1       # Outcome Date GA Lbr King/2nd Weight Sex Delivery Anes PTL Lv   3 Current            2 Ectopic 2015     ECTOPIC      1 Term 14 40w5d  4.326 kg (9 lb 8.6 oz) F CS-LTranv EPI N GAUDENCIO      Apgar1:  9                Apgar5: 9        Past Medical History:   Diagnosis Date    Arthritis     spinal    Asthma     was on advair , resolved    Benign tumor of spinal cord     Depression     GERD (gastroesophageal reflux disease)      Past Surgical History:   Procedure Laterality Date    APPENDECTOMY       SECTION      posteriorlaminectomy      For benign spinal tumor;        PTA Medications   Medication Sig    PRENATAL VIT #76/IRON,CARB/FA (PNV 29-1 ORAL) Take 1 tablet by mouth once daily.       Review of patient's allergies indicates:  No Known Allergies     Family History     Problem Relation (Age of Onset)    Liver disease Father        Social History Main Topics    Smoking status: Former Smoker     Packs/day: 1.00     Years: 13.00     Types: Cigarettes     Quit date: 10/20/2012    Smokeless tobacco: Former User    Alcohol use No    Drug use: No    Sexual activity: Yes     Partners: Male     Birth control/  protection: None     Review of Systems   Constitutional: Negative for chills and fever.   Eyes: Negative for visual disturbance.   Respiratory: Negative for shortness of breath.    Cardiovascular: Negative for chest pain and leg swelling.   Gastrointestinal: Negative for abdominal pain, nausea and vomiting.   Genitourinary: Negative for dysuria and vaginal bleeding.   Neurological: Negative for headaches.      Objective:     Vital Signs (Most Recent):  Temp: 97.2 °F (36.2 °C) (10/10/17 05)  Pulse: 88 (10/10/17 0555)  Resp: 18 (10/10/17 0525)  BP: 128/83 (10/10/17 05)  SpO2: 98 % (10/10/17 0555) Vital Signs (24h Range):  Temp:  [97.2 °F (36.2 °C)] 97.2 °F (36.2 °C)  Pulse:  [80-90] 88  Resp:  [18] 18  SpO2:  [97 %-98 %] 98 %  BP: (128)/(83) 128/83        There is no height or weight on file to calculate BMI.    FHT: 130 bpm Cat 1 (reassuring)  TOCO: none    Physical Exam:   Constitutional: She is oriented to person, place, and time. She appears well-developed and well-nourished. No distress.       Cardiovascular: Normal rate and regular rhythm.     Pulmonary/Chest: Effort normal.        Abdominal: Soft. She exhibits no distension. There is no tenderness.             Musculoskeletal: She exhibits no edema.       Neurological: She is alert and oriented to person, place, and time.    Skin: Skin is warm and dry.    Psychiatric: She has a normal mood and affect.       Cervix: def  Presentation: Vertex     Significant Labs:  Lab Results   Component Value Date    GROUPTRH O POS 2017    HEPBSAG Negative 2017    STREPBCULT No Group B Streptococcus isolated 2017       I have personallly reviewed all pertinent lab results from the last 24 hours.    Assessment/Plan:     37 y.o. female  at 39w0d for:    * History of  delivery affecting pregnancy    - Consents signed and to chart  - Admit to Labor and Delivery unit  - Epidural per Anesthesia  - Draw CBC, T&S  - Ancef OCTOR  - To OR for C/S.  Case Request is in.   - Notify Staff  - Ultrasound performed, infant in cephalic position.   - Post-Partum Hemorrhage risk - medium              Excessive fetal growth affecting management of pregnancy in third trimester    - EFW 4832g >99%  - to OR for RLTCS        Benign gestational thrombocytopenia in third trimester    - platelets recently 128, will repeat this am            Beatrice Fuentes MD  Obstetrics  [facility]       Detail Level: Zone Samples Given: DHS

## 2020-02-19 ENCOUNTER — PROCEDURE VISIT (OUTPATIENT)
Dept: MATERNAL FETAL MEDICINE | Facility: CLINIC | Age: 40
End: 2020-02-19
Payer: MEDICAID

## 2020-02-19 DIAGNOSIS — Z36.89 ENCOUNTER FOR ULTRASOUND TO CHECK FETAL GROWTH: ICD-10-CM

## 2020-02-19 DIAGNOSIS — O09.522 MULTIGRAVIDA OF ADVANCED MATERNAL AGE IN SECOND TRIMESTER: ICD-10-CM

## 2020-02-19 PROCEDURE — 76816 PR  US,PREGNANT UTERUS,F/U,TRANSABD APP: ICD-10-PCS | Mod: 26,S$PBB,, | Performed by: OBSTETRICS & GYNECOLOGY

## 2020-02-19 PROCEDURE — 76816 OB US FOLLOW-UP PER FETUS: CPT | Mod: 26,S$PBB,, | Performed by: OBSTETRICS & GYNECOLOGY

## 2020-02-19 PROCEDURE — 76816 OB US FOLLOW-UP PER FETUS: CPT | Mod: PBBFAC | Performed by: OBSTETRICS & GYNECOLOGY

## 2020-02-27 ENCOUNTER — ROUTINE PRENATAL (OUTPATIENT)
Dept: OBSTETRICS AND GYNECOLOGY | Facility: CLINIC | Age: 40
End: 2020-02-27
Payer: MEDICAID

## 2020-02-27 VITALS
DIASTOLIC BLOOD PRESSURE: 78 MMHG | SYSTOLIC BLOOD PRESSURE: 120 MMHG | WEIGHT: 253.75 LBS | BODY MASS INDEX: 40.96 KG/M2

## 2020-02-27 DIAGNOSIS — O99.212 OBESITY AFFECTING PREGNANCY IN SECOND TRIMESTER: ICD-10-CM

## 2020-02-27 DIAGNOSIS — J45.909 MATERNAL ASTHMA COMPLICATING PREGNANCY: ICD-10-CM

## 2020-02-27 DIAGNOSIS — O09.522 MULTIGRAVIDA OF ADVANCED MATERNAL AGE IN SECOND TRIMESTER: ICD-10-CM

## 2020-02-27 DIAGNOSIS — O99.519 MATERNAL ASTHMA COMPLICATING PREGNANCY: ICD-10-CM

## 2020-02-27 DIAGNOSIS — O09.92 SUPERVISION OF HIGH RISK PREGNANCY IN SECOND TRIMESTER: Primary | ICD-10-CM

## 2020-02-27 DIAGNOSIS — R76.0 ABNORMAL ANTIBODY TITER: ICD-10-CM

## 2020-02-27 PROCEDURE — 99999 PR PBB SHADOW E&M-EST. PATIENT-LVL III: CPT | Mod: PBBFAC,,, | Performed by: OBSTETRICS & GYNECOLOGY

## 2020-02-27 PROCEDURE — 99999 PR PBB SHADOW E&M-EST. PATIENT-LVL III: ICD-10-PCS | Mod: PBBFAC,,, | Performed by: OBSTETRICS & GYNECOLOGY

## 2020-02-27 PROCEDURE — 99213 OFFICE O/P EST LOW 20 MIN: CPT | Mod: PBBFAC,TH,PO | Performed by: OBSTETRICS & GYNECOLOGY

## 2020-02-27 PROCEDURE — 99213 OFFICE O/P EST LOW 20 MIN: CPT | Mod: TH,S$PBB,, | Performed by: OBSTETRICS & GYNECOLOGY

## 2020-02-27 PROCEDURE — 99213 PR OFFICE/OUTPT VISIT, EST, LEVL III, 20-29 MIN: ICD-10-PCS | Mod: TH,S$PBB,, | Performed by: OBSTETRICS & GYNECOLOGY

## 2020-02-28 NOTE — PROGRESS NOTES
Chief Complaint   Patient presents with    Routine Prenatal Visit     -increased headaches- nosebleeds-cramping       39 y.o., at 25w0d by Estimated Date of Delivery: 20    Complaints today: Reports recent HA and nosebleeds which she attributes to trouble sleeping and dry air in winter. Occasional nausea, no vomiting. Reports occasional pelvic cramping. Pt denies VB, LOF, or ctx. Reports good FM.     Patient Active Problem List   Diagnosis    Back pain, lumbosacral    Benign tumor of spinal cord    Undergoing workup for MS    GERD (gastroesophageal reflux disease)    Depression    Asthma    Urticaria    Obesity (BMI 35.0-39.9 without comorbidity)    Pre-diabetes    Early satiety    Cyst of left ovary    Pregnancy, supervision, high-risk - flu    Advanced maternal age in multigravida/declines genetics.    Maternal asthma complicating pregnancy    Obesity complicating pregnancy    Abnormal antibody titer - anti-E 1:2       Dating reviewed    Allergies and problem list reviewed and updated    Medical and surgical history reviewed    Prenatal labs reviewed and updated    ROS  OBSTETRICS:   Contractions N   Bleeding N   Loss of fluid N   Fetal mvmnt Normal  GASTRO:   Nausea Occasional   Vomiting N      OB History    Para Term  AB Living   4 2 2 0 1 2   SAB TAB Ectopic Multiple Live Births   0 0 1 0 2      # Outcome Date GA Lbr King/2nd Weight Sex Delivery Anes PTL Lv   4 Current            3 Term 10/10/17 39w0d  4.451 kg (9 lb 13 oz) F CS-LTranv Spinal, EPI N GAUDENCIO   2 Ectopic 2015     ECTOPIC      1 Term 14 40w5d  4.326 kg (9 lb 8.6 oz) F CS-LTranv EPI N GAUDENCIO         PHYSICAL EXAM  /78   Wt 115.1 kg (253 lb 12 oz)   LMP 2019 (Exact Date)   BMI 40.96 kg/m²     GENERAL: No acute distress  HEENT: Normocephalic  NEURO: Alert and oriented x3  PSYCH: Normal mood and affect  PULMONARY: Non-labored respiration; no tachypnea  ABD: Soft, gravid, nontender; no hernia or  hepatosplenomegaly    ASSESSMENT AND PLAN     3 Problems (from 19 to present)     Problem Noted Resolved    Pregnancy, supervision, high-risk - flu 2019 by Yamileth Weber MD No    Advanced maternal age in multigravida/declines genetics. 2019 by Yamileth Weber MD No    Maternal asthma complicating pregnancy 2019 by Yamileth Weber MD No    Obesity complicating pregnancy 2019 by Yamileth Weber MD No    Abnormal antibody titer - anti-E 1:2 2019 by Yamileth Weber MD No          Ann was seen today for routine prenatal visit.    Diagnoses and all orders for this visit:    Supervision of high risk pregnancy in second trimester  -     CBC auto differential; Future  -     OB Glucose Screen; Future  - 2T labs ordered as above  - Tdap at next visit    Multigravida of advanced maternal age in second trimester    Maternal asthma complicating pregnancy  - Asx, good control    Obesity affecting pregnancy in second trimester  - Recommend daily ASA    Abnormal antibody titer - anti-E 1:2  -     Type & Screen; Future  -     Antibody titer; Future        - Anti E 1:2 on , needs recheck       labor precautions given  Bleeding and pain precautions  Follow-up: 4 weeks    Beatrice Bridges M.D.  OB/GYN PGY-1

## 2020-03-02 ENCOUNTER — LAB VISIT (OUTPATIENT)
Dept: LAB | Facility: OTHER | Age: 40
End: 2020-03-02
Attending: OBSTETRICS & GYNECOLOGY
Payer: MEDICAID

## 2020-03-02 DIAGNOSIS — R76.0 ABNORMAL ANTIBODY TITER: ICD-10-CM

## 2020-03-02 DIAGNOSIS — O09.92 SUPERVISION OF HIGH RISK PREGNANCY IN SECOND TRIMESTER: ICD-10-CM

## 2020-03-02 LAB
ABO + RH BLD: NORMAL
BASOPHILS # BLD AUTO: 0.03 K/UL (ref 0–0.2)
BASOPHILS NFR BLD: 0.3 % (ref 0–1.9)
BLD GP AB SCN CELLS X3 SERPL QL: NORMAL
BLOOD GROUP ANTIBODIES SERPL: NORMAL
DIFFERENTIAL METHOD: ABNORMAL
EOSINOPHIL # BLD AUTO: 0.2 K/UL (ref 0–0.5)
EOSINOPHIL NFR BLD: 1.6 % (ref 0–8)
ERYTHROCYTE [DISTWIDTH] IN BLOOD BY AUTOMATED COUNT: 14.2 % (ref 11.5–14.5)
GLUCOSE SERPL-MCNC: 102 MG/DL (ref 70–140)
HCT VFR BLD AUTO: 33.8 % (ref 37–48.5)
HGB BLD-MCNC: 10.9 G/DL (ref 12–16)
IMM GRANULOCYTES # BLD AUTO: 0.11 K/UL (ref 0–0.04)
IMM GRANULOCYTES NFR BLD AUTO: 1.1 % (ref 0–0.5)
LYMPHOCYTES # BLD AUTO: 1.3 K/UL (ref 1–4.8)
LYMPHOCYTES NFR BLD: 12.9 % (ref 18–48)
MCH RBC QN AUTO: 28.9 PG (ref 27–31)
MCHC RBC AUTO-ENTMCNC: 32.2 G/DL (ref 32–36)
MCV RBC AUTO: 90 FL (ref 82–98)
MONOCYTES # BLD AUTO: 0.7 K/UL (ref 0.3–1)
MONOCYTES NFR BLD: 6.7 % (ref 4–15)
NEUTROPHILS # BLD AUTO: 8 K/UL (ref 1.8–7.7)
NEUTROPHILS NFR BLD: 77.4 % (ref 38–73)
NRBC BLD-RTO: 0 /100 WBC
PLATELET # BLD AUTO: 127 K/UL (ref 150–350)
PMV BLD AUTO: 12.8 FL (ref 9.2–12.9)
RBC # BLD AUTO: 3.77 M/UL (ref 4–5.4)
WBC # BLD AUTO: 10.28 K/UL (ref 3.9–12.7)

## 2020-03-02 PROCEDURE — 86886 COOMBS TEST INDIRECT TITER: CPT

## 2020-03-02 PROCEDURE — 36415 COLL VENOUS BLD VENIPUNCTURE: CPT

## 2020-03-02 PROCEDURE — 86850 RBC ANTIBODY SCREEN: CPT

## 2020-03-02 PROCEDURE — 85025 COMPLETE CBC W/AUTO DIFF WBC: CPT

## 2020-03-02 PROCEDURE — 82950 GLUCOSE TEST: CPT

## 2020-03-02 PROCEDURE — 86870 RBC ANTIBODY IDENTIFICATION: CPT

## 2020-03-03 ENCOUNTER — TELEPHONE (OUTPATIENT)
Dept: MATERNAL FETAL MEDICINE | Facility: CLINIC | Age: 40
End: 2020-03-03

## 2020-03-03 ENCOUNTER — PATIENT MESSAGE (OUTPATIENT)
Dept: OBSTETRICS AND GYNECOLOGY | Facility: CLINIC | Age: 40
End: 2020-03-03

## 2020-03-03 ENCOUNTER — ANESTHESIA EVENT (OUTPATIENT)
Dept: OBSTETRICS AND GYNECOLOGY | Facility: OTHER | Age: 40
End: 2020-03-03

## 2020-03-03 ENCOUNTER — ANESTHESIA (OUTPATIENT)
Dept: OBSTETRICS AND GYNECOLOGY | Facility: OTHER | Age: 40
End: 2020-03-03

## 2020-03-03 ENCOUNTER — HOSPITAL ENCOUNTER (OUTPATIENT)
Facility: OTHER | Age: 40
LOS: 1 days | Discharge: HOME | DRG: 832 | End: 2020-03-05
Attending: OBSTETRICS & GYNECOLOGY | Admitting: OBSTETRICS & GYNECOLOGY
Payer: MEDICAID

## 2020-03-03 DIAGNOSIS — O36.1921 MATERNAL ATYPICAL ANTIBODY AFFECTING PREGNANCY IN SECOND TRIMESTER, FETUS 1 OF MULTIPLE GESTATION: Primary | ICD-10-CM

## 2020-03-03 DIAGNOSIS — R06.02 SHORTNESS OF BREATH: ICD-10-CM

## 2020-03-03 DIAGNOSIS — R10.9 ABDOMINAL PAIN: ICD-10-CM

## 2020-03-03 DIAGNOSIS — R76.0 ABNORMAL ANTIBODY TITER: Primary | ICD-10-CM

## 2020-03-03 DIAGNOSIS — O36.1920: ICD-10-CM

## 2020-03-03 DIAGNOSIS — Z3A.25 25 WEEKS GESTATION OF PREGNANCY: ICD-10-CM

## 2020-03-03 PROBLEM — O99.119 THROMBOCYTOPENIA AFFECTING PREGNANCY: Status: ACTIVE | Noted: 2020-03-03

## 2020-03-03 PROBLEM — R11.2 NON-INTRACTABLE VOMITING WITH NAUSEA: Status: ACTIVE | Noted: 2020-03-03

## 2020-03-03 PROBLEM — D69.6 THROMBOCYTOPENIA AFFECTING PREGNANCY: Status: ACTIVE | Noted: 2020-03-03

## 2020-03-03 LAB
ALBUMIN SERPL BCP-MCNC: 3.4 G/DL (ref 3.5–5.2)
ALP SERPL-CCNC: 61 U/L (ref 55–135)
ALT SERPL W/O P-5'-P-CCNC: 81 U/L (ref 10–44)
ANION GAP SERPL CALC-SCNC: 11 MMOL/L (ref 8–16)
APTT BLDCRRT: 31.7 SEC (ref 21–32)
AST SERPL-CCNC: 208 U/L (ref 10–40)
BASOPHILS # BLD AUTO: 0.02 K/UL (ref 0–0.2)
BASOPHILS NFR BLD: 0.2 % (ref 0–1.9)
BILIRUB DIRECT SERPL-MCNC: 0.2 MG/DL (ref 0.1–0.3)
BILIRUB SERPL-MCNC: 0.5 MG/DL (ref 0.1–1)
BILIRUB SERPL-MCNC: NORMAL MG/DL
BILIRUB UR QL STRIP: NEGATIVE
BLOOD URINE, POC: NORMAL
BUN SERPL-MCNC: 7 MG/DL (ref 6–20)
CALCIUM SERPL-MCNC: 8.8 MG/DL (ref 8.7–10.5)
CHLORIDE SERPL-SCNC: 106 MMOL/L (ref 95–110)
CLARITY UR: CLEAR
CO2 SERPL-SCNC: 20 MMOL/L (ref 23–29)
COLOR UR: YELLOW
COLOR, POC UA: NORMAL
CREAT SERPL-MCNC: 0.6 MG/DL (ref 0.5–1.4)
CREAT UR-MCNC: 126.1 MG/DL (ref 15–325)
DIFFERENTIAL METHOD: ABNORMAL
EOSINOPHIL # BLD AUTO: 0.1 K/UL (ref 0–0.5)
EOSINOPHIL NFR BLD: 0.7 % (ref 0–8)
ERYTHROCYTE [DISTWIDTH] IN BLOOD BY AUTOMATED COUNT: 14.1 % (ref 11.5–14.5)
EST. GFR  (AFRICAN AMERICAN): >60 ML/MIN/1.73 M^2
EST. GFR  (NON AFRICAN AMERICAN): >60 ML/MIN/1.73 M^2
FIBRINOGEN PPP-MCNC: 386 MG/DL (ref 182–366)
GLUCOSE SERPL-MCNC: 96 MG/DL (ref 70–110)
GLUCOSE UR QL STRIP: NEGATIVE
GLUCOSE UR QL STRIP: NORMAL
HCT VFR BLD AUTO: 35.4 % (ref 37–48.5)
HGB BLD-MCNC: 11.8 G/DL (ref 12–16)
HGB UR QL STRIP: NEGATIVE
IMM GRANULOCYTES # BLD AUTO: 0.08 K/UL (ref 0–0.04)
IMM GRANULOCYTES NFR BLD AUTO: 0.9 % (ref 0–0.5)
INFLUENZA A, MOLECULAR: NEGATIVE
INFLUENZA B, MOLECULAR: NEGATIVE
INR PPP: 0.9 (ref 0.8–1.2)
KETONES UR QL STRIP: ABNORMAL
KETONES UR QL STRIP: NORMAL
LDH SERPL L TO P-CCNC: 194 U/L (ref 110–260)
LDH SERPL L TO P-CCNC: 194 U/L (ref 110–260)
LEUKOCYTE ESTERASE UR QL STRIP: NEGATIVE
LEUKOCYTE ESTERASE URINE, POC: NORMAL
LIPASE SERPL-CCNC: 7 U/L (ref 4–60)
LYMPHOCYTES # BLD AUTO: 0.6 K/UL (ref 1–4.8)
LYMPHOCYTES NFR BLD: 6.5 % (ref 18–48)
MCH RBC QN AUTO: 29.6 PG (ref 27–31)
MCHC RBC AUTO-ENTMCNC: 33.3 G/DL (ref 32–36)
MCV RBC AUTO: 89 FL (ref 82–98)
MONOCYTES # BLD AUTO: 0.3 K/UL (ref 0.3–1)
MONOCYTES NFR BLD: 2.9 % (ref 4–15)
NEUTROPHILS # BLD AUTO: 8.1 K/UL (ref 1.8–7.7)
NEUTROPHILS NFR BLD: 88.8 % (ref 38–73)
NITRITE UR QL STRIP: NEGATIVE
NITRITE, POC UA: NORMAL
NRBC BLD-RTO: 0 /100 WBC
PH UR STRIP: 6 [PH] (ref 5–8)
PH, POC UA: NORMAL
PLATELET # BLD AUTO: 111 K/UL (ref 150–350)
PMV BLD AUTO: 12.8 FL (ref 9.2–12.9)
POTASSIUM SERPL-SCNC: 4.1 MMOL/L (ref 3.5–5.1)
PROT SERPL-MCNC: 7.3 G/DL (ref 6–8.4)
PROT UR QL STRIP: NEGATIVE
PROT UR-MCNC: 12 MG/DL (ref 0–15)
PROT/CREAT UR: 0.1 MG/G{CREAT} (ref 0–0.2)
PROTEIN, POC: NORMAL
PROTHROMBIN TIME: 10 SEC (ref 9–12.5)
RBC # BLD AUTO: 3.98 M/UL (ref 4–5.4)
SODIUM SERPL-SCNC: 137 MMOL/L (ref 136–145)
SP GR UR STRIP: 1.02 (ref 1–1.03)
SPECIFIC GRAVITY, POC UA: NORMAL
SPECIMEN SOURCE: NORMAL
URN SPEC COLLECT METH UR: ABNORMAL
UROBILINOGEN UR STRIP-ACNC: NEGATIVE EU/DL
UROBILINOGEN, POC UA: NORMAL
WBC # BLD AUTO: 9.1 K/UL (ref 3.9–12.7)

## 2020-03-03 PROCEDURE — 99285 PR EMERGENCY DEPT VISIT,LEVEL V: ICD-10-PCS | Mod: 25,,, | Performed by: OBSTETRICS & GYNECOLOGY

## 2020-03-03 PROCEDURE — 63600175 PHARM REV CODE 636 W HCPCS: Performed by: STUDENT IN AN ORGANIZED HEALTH CARE EDUCATION/TRAINING PROGRAM

## 2020-03-03 PROCEDURE — 93005 ELECTROCARDIOGRAM TRACING: CPT

## 2020-03-03 PROCEDURE — 99222 PR INITIAL HOSPITAL CARE,LEVL II: ICD-10-PCS | Mod: ,,, | Performed by: PEDIATRICS

## 2020-03-03 PROCEDURE — 85730 THROMBOPLASTIN TIME PARTIAL: CPT

## 2020-03-03 PROCEDURE — 59025 PR FETAL 2N-STRESS TEST: ICD-10-PCS | Mod: 26,,, | Performed by: OBSTETRICS & GYNECOLOGY

## 2020-03-03 PROCEDURE — 11000001 HC ACUTE MED/SURG PRIVATE ROOM

## 2020-03-03 PROCEDURE — 96374 THER/PROPH/DIAG INJ IV PUSH: CPT

## 2020-03-03 PROCEDURE — 85610 PROTHROMBIN TIME: CPT

## 2020-03-03 PROCEDURE — G0378 HOSPITAL OBSERVATION PER HR: HCPCS

## 2020-03-03 PROCEDURE — 36415 COLL VENOUS BLD VENIPUNCTURE: CPT

## 2020-03-03 PROCEDURE — 80074 ACUTE HEPATITIS PANEL: CPT

## 2020-03-03 PROCEDURE — 83615 LACTATE (LD) (LDH) ENZYME: CPT

## 2020-03-03 PROCEDURE — 93010 EKG 12-LEAD: ICD-10-PCS | Mod: ,,, | Performed by: INTERNAL MEDICINE

## 2020-03-03 PROCEDURE — 99285 EMERGENCY DEPT VISIT HI MDM: CPT | Mod: 25

## 2020-03-03 PROCEDURE — 96372 THER/PROPH/DIAG INJ SC/IM: CPT | Mod: 59

## 2020-03-03 PROCEDURE — 82248 BILIRUBIN DIRECT: CPT

## 2020-03-03 PROCEDURE — 84156 ASSAY OF PROTEIN URINE: CPT

## 2020-03-03 PROCEDURE — 85384 FIBRINOGEN ACTIVITY: CPT

## 2020-03-03 PROCEDURE — 80053 COMPREHEN METABOLIC PANEL: CPT

## 2020-03-03 PROCEDURE — 99285 EMERGENCY DEPT VISIT HI MDM: CPT | Mod: 25,,, | Performed by: OBSTETRICS & GYNECOLOGY

## 2020-03-03 PROCEDURE — 87502 INFLUENZA DNA AMP PROBE: CPT

## 2020-03-03 PROCEDURE — 93010 ELECTROCARDIOGRAM REPORT: CPT | Mod: ,,, | Performed by: INTERNAL MEDICINE

## 2020-03-03 PROCEDURE — 85025 COMPLETE CBC W/AUTO DIFF WBC: CPT

## 2020-03-03 PROCEDURE — 59025 FETAL NON-STRESS TEST: CPT | Mod: 26,,, | Performed by: OBSTETRICS & GYNECOLOGY

## 2020-03-03 PROCEDURE — 83690 ASSAY OF LIPASE: CPT

## 2020-03-03 PROCEDURE — 99222 1ST HOSP IP/OBS MODERATE 55: CPT | Mod: ,,, | Performed by: PEDIATRICS

## 2020-03-03 PROCEDURE — 81003 URINALYSIS AUTO W/O SCOPE: CPT

## 2020-03-03 RX ORDER — ONDANSETRON 8 MG/1
8 TABLET, ORALLY DISINTEGRATING ORAL EVERY 8 HOURS PRN
Status: DISCONTINUED | OUTPATIENT
Start: 2020-03-03 | End: 2020-03-05 | Stop reason: HOSPADM

## 2020-03-03 RX ORDER — SIMETHICONE 80 MG
1 TABLET,CHEWABLE ORAL EVERY 6 HOURS PRN
Status: DISCONTINUED | OUTPATIENT
Start: 2020-03-03 | End: 2020-03-05 | Stop reason: HOSPADM

## 2020-03-03 RX ORDER — PROCHLORPERAZINE MALEATE 5 MG
5 TABLET ORAL ONCE
Status: COMPLETED | OUTPATIENT
Start: 2020-03-03 | End: 2020-03-03

## 2020-03-03 RX ORDER — PRENATAL WITH FERROUS FUM AND FOLIC ACID 3080; 920; 120; 400; 22; 1.84; 3; 20; 10; 1; 12; 200; 27; 25; 2 [IU]/1; [IU]/1; MG/1; [IU]/1; MG/1; MG/1; MG/1; MG/1; MG/1; MG/1; UG/1; MG/1; MG/1; MG/1; MG/1
1 TABLET ORAL DAILY
Status: DISCONTINUED | OUTPATIENT
Start: 2020-03-04 | End: 2020-03-05 | Stop reason: HOSPADM

## 2020-03-03 RX ORDER — ACETAMINOPHEN 325 MG/1
650 TABLET ORAL EVERY 6 HOURS PRN
Status: DISCONTINUED | OUTPATIENT
Start: 2020-03-03 | End: 2020-03-03

## 2020-03-03 RX ORDER — DIPHENHYDRAMINE HCL 25 MG
25 CAPSULE ORAL EVERY 4 HOURS PRN
Status: DISCONTINUED | OUTPATIENT
Start: 2020-03-03 | End: 2020-03-05 | Stop reason: HOSPADM

## 2020-03-03 RX ORDER — DEXTROSE MONOHYDRATE AND SODIUM CHLORIDE 5; .45 G/100ML; G/100ML
INJECTION, SOLUTION INTRAVENOUS CONTINUOUS
Status: DISCONTINUED | OUTPATIENT
Start: 2020-03-04 | End: 2020-03-04

## 2020-03-03 RX ORDER — ONDANSETRON 2 MG/ML
4 INJECTION INTRAMUSCULAR; INTRAVENOUS ONCE
Status: COMPLETED | OUTPATIENT
Start: 2020-03-03 | End: 2020-03-03

## 2020-03-03 RX ORDER — SODIUM CHLORIDE 9 MG/ML
INJECTION, SOLUTION INTRAVENOUS CONTINUOUS
Status: DISCONTINUED | OUTPATIENT
Start: 2020-03-03 | End: 2020-03-04

## 2020-03-03 RX ORDER — BETAMETHASONE SODIUM PHOSPHATE AND BETAMETHASONE ACETATE 3; 3 MG/ML; MG/ML
12 INJECTION, SUSPENSION INTRA-ARTICULAR; INTRALESIONAL; INTRAMUSCULAR; SOFT TISSUE EVERY 24 HOURS
Status: COMPLETED | OUTPATIENT
Start: 2020-03-03 | End: 2020-03-04

## 2020-03-03 RX ORDER — DIPHENHYDRAMINE HYDROCHLORIDE 50 MG/ML
25 INJECTION INTRAMUSCULAR; INTRAVENOUS EVERY 4 HOURS PRN
Status: DISCONTINUED | OUTPATIENT
Start: 2020-03-03 | End: 2020-03-05 | Stop reason: HOSPADM

## 2020-03-03 RX ORDER — AMOXICILLIN 250 MG
1 CAPSULE ORAL NIGHTLY PRN
Status: DISCONTINUED | OUTPATIENT
Start: 2020-03-03 | End: 2020-03-05 | Stop reason: HOSPADM

## 2020-03-03 RX ADMIN — SODIUM CHLORIDE, SODIUM LACTATE, POTASSIUM CHLORIDE, AND CALCIUM CHLORIDE 1000 ML: .6; .31; .03; .02 INJECTION, SOLUTION INTRAVENOUS at 03:03

## 2020-03-03 RX ADMIN — ONDANSETRON 4 MG: 2 INJECTION INTRAMUSCULAR; INTRAVENOUS at 02:03

## 2020-03-03 RX ADMIN — DEXTROSE, SODIUM CHLORIDE, SODIUM LACTATE, POTASSIUM CHLORIDE, AND CALCIUM CHLORIDE 1000 ML: 5; .6; .31; .03; .02 INJECTION, SOLUTION INTRAVENOUS at 05:03

## 2020-03-03 RX ADMIN — SODIUM CHLORIDE, SODIUM LACTATE, POTASSIUM CHLORIDE, AND CALCIUM CHLORIDE 1000 ML: .6; .31; .03; .02 INJECTION, SOLUTION INTRAVENOUS at 02:03

## 2020-03-03 RX ADMIN — BETAMETHASONE SODIUM PHOSPHATE AND BETAMETHASONE ACETATE 12 MG: 3; 3 INJECTION, SUSPENSION INTRA-ARTICULAR; INTRALESIONAL; INTRAMUSCULAR at 07:03

## 2020-03-03 RX ADMIN — PROCHLORPERAZINE MALEATE 5 MG: 5 TABLET ORAL at 10:03

## 2020-03-03 NOTE — ED TRIAGE NOTES
Pt presents to [facility] c/o nausea, headache, abdominal pain, and blurry vision.  Pt denies VB and LOF and endorses FM.  Dr. Lopez notified of pt's arrival.

## 2020-03-03 NOTE — TELEPHONE ENCOUNTER
Message left for pt to call MFM clinic at [phone] to schedule MFM ultrasound and consult. Per Dr. Traore, patient to be added to the MFM schedule for tomorrow.

## 2020-03-03 NOTE — ED PROVIDER NOTES
Encounter Date: 3/3/2020       History     Chief Complaint   Patient presents with    Nausea    Headache    Abdominal Pain     Ann Godfrey is a 39 y.o. [id number] at 25w5d who presents complaining of abdominal pain, N/V, dizziness/lightheadedness, myalgias, and generalized malaise. The abdominal pain is generalized, worse in the lower quadrants. Reports two episodes of vomiting this AM; denies hematemesis. Had one large volume loose stool this AM but denies recurrent episodes of diarrhea. Was able to tolerate p.o. as of yesterday. Reports subjective F/C over the past several hours but has not taken her temperature. Also endorses blurred vision and HA over the past 2 weeks; reports HA  relieved with Tylenol. Reports mild SOB but denies any CP or new onset swelling. Also endorses frequent nose bleeds over the past several weeks. Per chart review, patient has history of significant menorrhagia outside of pregnancy.    This IUP is complicated by obesity, AMA, abnormal blood antibody titer (Anti E1:64 and Anti c no titer), asthma, and history of prior  x2.  Patient denies contractions, vaginal bleeding, LOF.  Fetal Movement: normal.        Review of patient's allergies indicates:  No Known Allergies  Past Medical History:   Diagnosis Date    Arthritis     spinal    Asthma     was on advair , resolved    Benign tumor of spinal cord     Depression     Ectopic pregnancy     GERD (gastroesophageal reflux disease)     Pre-diabetes     Urticaria      Past Surgical History:   Procedure Laterality Date    APPENDECTOMY       SECTION      x2    ECTOPIC PREGNANCY SURGERY      posteriorlaminectomy      For benign spinal tumor;      Family History   Problem Relation Age of Onset    Hypertension Mother     Hepatitis Father         cleared    Breast cancer Neg Hx     Colon cancer Neg Hx     Ovarian cancer Neg Hx     Stroke Neg Hx     Diabetes Neg Hx     Heart disease Neg Hx       Social History     Tobacco Use    Smoking status: Former Smoker     Packs/day: 1.00     Years: 13.00     Pack years: 13.00     Types: Cigarettes     Last attempt to quit: 10/20/2012     Years since quittin.3    Smokeless tobacco: Former User   Substance Use Topics    Alcohol use: No     Frequency: 2-4 times a month     Drinks per session: 1 or 2     Binge frequency: Never    Drug use: No     Review of Systems   Constitutional: Positive for chills and fever. Negative for fatigue.   HENT: Negative for congestion.    Eyes: Positive for visual disturbance.   Respiratory: Negative for cough and shortness of breath.    Cardiovascular: Negative for chest pain.   Gastrointestinal: Positive for abdominal pain (Bilateral upper quadrants L > R), diarrhea, nausea and vomiting.   Genitourinary: Negative for dysuria, pelvic pain, vaginal bleeding and vaginal discharge.   Musculoskeletal: Positive for back pain.   Skin: Negative for rash.   Neurological: Positive for headaches.       Physical Exam     Initial Vitals   BP Pulse Resp Temp SpO2   20 1336 20 1336 20 0724 20 1337 20 1337   101/61 104 16 97 °F (36.1 °C) 95 %      MAP       --                Physical Exam    Vitals reviewed.  Constitutional: She appears well-developed and well-nourished. She is not diaphoretic. No distress.   Obese female   HENT:   Head: Normocephalic and atraumatic.   Eyes: Conjunctivae and EOM are normal. Right eye exhibits no discharge. Left eye exhibits no discharge.   Neck: Normal range of motion.   Cardiovascular: Normal rate, regular rhythm and normal heart sounds.   Pulmonary/Chest: Breath sounds normal. No respiratory distress. She has no wheezes. She has no rhonchi. She has no rales.   Abdominal: Soft. There is generalized tenderness. There is no rebound and no guarding.   Gravid uterus   Musculoskeletal: Normal range of motion. She exhibits no edema.   Neurological: She is alert and oriented to person,  place, and time.   Skin: Skin is warm and dry. No rash noted. No erythema. There is pallor.   Psychiatric: She has a normal mood and affect. Her behavior is normal. Judgment and thought content normal.         ED Course   Obtain Fetal nonstress test (NST)  Date/Time: 3/3/2020 2:17 PM  Performed by: Beatrice Bridges MD  Authorized by: Beatrice Bridges MD     Nonstress Test:     Variability:  6-25 BPM    Decelerations:  None    Accelerations:  15 bpm    Baseline:  140    Uterine Irritability: No      Contractions:  Not present  Biophysical Profile:     Nonstress Test Interpretation: reactive      Overall Impression:  Reassuring      Labs Reviewed   URINALYSIS, REFLEX TO URINE CULTURE - Abnormal; Notable for the following components:       Result Value    Ketones, UA 2+ (*)     All other components within normal limits    Narrative:     Preferred Collection Type->Urine, Clean Catch   CBC W/ AUTO DIFFERENTIAL - Abnormal; Notable for the following components:    RBC 3.98 (*)     Hemoglobin 11.8 (*)     Hematocrit 35.4 (*)     Platelets 111 (*)     Immature Granulocytes 0.9 (*)     Gran # (ANC) 8.1 (*)     Immature Grans (Abs) 0.08 (*)     Lymph # 0.6 (*)     Gran% 88.8 (*)     Lymph% 6.5 (*)     Mono% 2.9 (*)     All other components within normal limits   COMPREHENSIVE METABOLIC PANEL - Abnormal; Notable for the following components:    CO2 20 (*)     Albumin 3.4 (*)      (*)     ALT 81 (*)     All other components within normal limits   FIBRINOGEN - Abnormal; Notable for the following components:    Fibrinogen 386 (*)     All other components within normal limits   INFLUENZA A & B BY MOLECULAR   PROTEIN / CREATININE RATIO, URINE   LIPASE   BILIRUBIN, DIRECT   LACTATE DEHYDROGENASE   PROTIME-INR   APTT   LACTATE DEHYDROGENASE   POCT URINALYSIS, DIPSTICK OR TABLET REAGENT, AUTOMATED, WITH MICROSCOP        ECG Results          EKG 12-lead (Final result)  Result time 03/03/20 19:58:53    Final result by William LARRY  GIOVANA Nettles (20 19:58:53)                 Narrative:    William Nettles, GIOVANA     3/3/2020  7:58 PM  Done in [facility]                            Imaging Results          US Abdomen Limited (Final result)  Result time 20 17:27:19    Final result by Clarke Cheema MD (20 17:27:19)                 Impression:      Diffusely echogenic appearance of the hepatic parenchyma, findings suggest steatosis or other infiltrative process.  Correlation with LFTs advised noting hepatomegaly.      Electronically signed by: Clarke Cheema MD  Date:    2020  Time:    17:27             Narrative:    EXAMINATION:  US ABDOMEN LIMITED    CLINICAL HISTORY:  Elevated liver enzymes;    TECHNIQUE:  Limited ultrasound of the right upper quadrant of the abdomen (including pancreas, liver, gallbladder, common bile duct, and spleen) was performed.    COMPARISON:  None.    FINDINGS:  The visualized portions of the pancreas are unremarkable.  The visualized aorta and IVC are unremarkable.  The gallbladder is unremarkable.  No gallbladder wall thickening or gallbladder wall hyperemia.  Sonographic Butcher sign is negative.  The common duct is not dilated measuring 0.4 cm.  The hepatic parenchyma is diffusely echogenic suggesting steatosis or other infiltrative process.  The liver is enlarged measuring 22 cm.  The visualized right kidney is unremarkable.  No ascites seen.  The spleen is not enlarged.                                 Medical Decision Making:   Initial Assessment:   Ann Godfrey is a 39 y.o. [id number] at 25w5d who presents complaining of abdominal pain, N/V, dizziness/lightheadedness, myalgias, and generalized malaise.     Independently Interpreted Test(s):   I have ordered and independently interpreted EKG Reading(s) - see summary below       <> Summary of EKG Reading(s): NSR at 97  Regular intervals  No ST or T wave changes  Normal EKG    ED Management:  VSS. NST reactive and reassuring. Will get flu  swab, preE labs, EKG. IV fluids and zofran for nausea.    EKG WNL. CBC notable for platelets 111. CMP reveals elevated AST//81. Chart review shows elevated transaminases on prior CMPs, but never this high. Will get coags, hepatitis panel, RUQ ultrasound.     Coags WNL. RUQ with echogenic liver. Hep panel still panel. Bedside ultrasound with overall normal appearing fetus, no evidence of hydrops. Discussed with MFM. Patient with sudden worsening of antibody titers that were drawn as outpatient yesterday. Unclear if symptoms are all related. Will admit to antepartum for further monitoring and evaluation.     Other:   I have discussed this case with another health care provider.       <> Summary of the Discussion: Dr. Correia              Attending Attestation:   Physician Attestation Statement for Resident:  As the supervising MD   Physician Attestation Statement: I have personally seen and examined this patient.   I agree with the above history. -:   As the supervising MD I agree with the above PE.    As the supervising MD I agree with the above treatment, course, plan, and disposition.  I was personally present during the critical portions of the procedure(s) performed by the resident and was immediately available in the ED to provide services and assistance as needed during the entire procedure.  I have reviewed and agree with the residents interpretation of the following: rhythm strips, lab data and EKG.  I have reviewed the following: old records at this facility.                    ED Course as of Mar 04 1209   Tue Mar 03, 2020   1421 I evaluated Ms. Godfrey and discussed plan with Dr. Bridges.  Pt presents at 25w5d with multiple symptoms - many likely due to dehydration.    Going to do work up for flu, pre-e, getting EKG,IV bolus and zofran.    Will follow up  VSS, fetal status reassuring    [HU]   1431 EKG NSR at 97 with no st-t wave changes    [HU]   1631 Labs reviewed, LFT's elevated and platelets  111  Pt states that she only vomited 2x today.   Will get RUQ US and coagulation studies due to pt w recent h/o frequent nose bleeds, and when not pregnant very heavy menses.      [HU]   1826 RUQ with echogenic infiltration, coagulation studies normal, pending Hep panel  Case reviewed with M.  BSUS by Dr. Cooper showed no evidence of hydrops.   At this point, will admit to antepartum.  Repeat labs in AM, formal MFM US, follow up abnormal titers in anti-C and anti-e antibodies.  Plans d/w pt/family.  No questions at this time.  In agreement with plan    [HU]      ED Course User Index  [HU] Roxy Correia DO        Clinical Impression:       ICD-10-CM ICD-9-CM   1. Maternal atypical antibody affecting pregnancy in second trimester, fetus 1 of multiple gestation O36.1921 656.23   2. Shortness of breath R06.02 786.05   3. Abdominal pain R10.9 789.00   4. 25 weeks gestation of pregnancy Z3A.25 V22.2       Disposition:   Disposition: Admitted  Condition: Stable     ED Disposition Condition    Send to L&D                 Li Quesada MD  Resident  03/03/20 0569       Roxy Correia DO  03/04/20 1210

## 2020-03-04 DIAGNOSIS — R76.0 ABNORMAL ANTIBODY TITER: Primary | ICD-10-CM

## 2020-03-04 DIAGNOSIS — Z36.89 ENCOUNTER FOR FETAL ANATOMIC SURVEY: ICD-10-CM

## 2020-03-04 LAB
ALBUMIN SERPL BCP-MCNC: 3.2 G/DL (ref 3.5–5.2)
ALP SERPL-CCNC: 63 U/L (ref 55–135)
ALT SERPL W/O P-5'-P-CCNC: 78 U/L (ref 10–44)
ANION GAP SERPL CALC-SCNC: 9 MMOL/L (ref 8–16)
APTT BLDCRRT: 32.6 SEC (ref 21–32)
AST SERPL-CCNC: 113 U/L (ref 10–40)
BASOPHILS # BLD AUTO: 0.01 K/UL (ref 0–0.2)
BASOPHILS NFR BLD: 0.2 % (ref 0–1.9)
BILIRUB DIRECT SERPL-MCNC: 0.2 MG/DL (ref 0.1–0.3)
BILIRUB SERPL-MCNC: 0.4 MG/DL (ref 0.1–1)
BUN SERPL-MCNC: 4 MG/DL (ref 6–20)
CALCIUM SERPL-MCNC: 8.4 MG/DL (ref 8.7–10.5)
CHLORIDE SERPL-SCNC: 108 MMOL/L (ref 95–110)
CO2 SERPL-SCNC: 19 MMOL/L (ref 23–29)
CREAT SERPL-MCNC: 0.5 MG/DL (ref 0.5–1.4)
DIFFERENTIAL METHOD: ABNORMAL
EOSINOPHIL # BLD AUTO: 0 K/UL (ref 0–0.5)
EOSINOPHIL NFR BLD: 0.2 % (ref 0–8)
ERYTHROCYTE [DISTWIDTH] IN BLOOD BY AUTOMATED COUNT: 14 % (ref 11.5–14.5)
EST. GFR  (AFRICAN AMERICAN): >60 ML/MIN/1.73 M^2
EST. GFR  (NON AFRICAN AMERICAN): >60 ML/MIN/1.73 M^2
FIBRINOGEN PPP-MCNC: 422 MG/DL (ref 182–366)
GLUCOSE SERPL-MCNC: 159 MG/DL (ref 70–110)
HAV IGM SERPL QL IA: NEGATIVE
HBV CORE IGM SERPL QL IA: NEGATIVE
HBV SURFACE AG SERPL QL IA: NEGATIVE
HCT VFR BLD AUTO: 32.6 % (ref 37–48.5)
HCV AB SERPL QL IA: NEGATIVE
HGB BLD-MCNC: 10.6 G/DL (ref 12–16)
IMM GRANULOCYTES # BLD AUTO: 0.11 K/UL (ref 0–0.04)
IMM GRANULOCYTES NFR BLD AUTO: 1.8 % (ref 0–0.5)
INR PPP: 0.9 (ref 0.8–1.2)
LIPASE SERPL-CCNC: 7 U/L (ref 4–60)
LYMPHOCYTES # BLD AUTO: 0.6 K/UL (ref 1–4.8)
LYMPHOCYTES NFR BLD: 9.4 % (ref 18–48)
MCH RBC QN AUTO: 29.2 PG (ref 27–31)
MCHC RBC AUTO-ENTMCNC: 32.5 G/DL (ref 32–36)
MCV RBC AUTO: 90 FL (ref 82–98)
MONOCYTES # BLD AUTO: 0.2 K/UL (ref 0.3–1)
MONOCYTES NFR BLD: 3.3 % (ref 4–15)
NEUTROPHILS # BLD AUTO: 5.2 K/UL (ref 1.8–7.7)
NEUTROPHILS NFR BLD: 85.1 % (ref 38–73)
NRBC BLD-RTO: 0 /100 WBC
PLATELET # BLD AUTO: 100 K/UL (ref 150–350)
PMV BLD AUTO: 12.9 FL (ref 9.2–12.9)
POTASSIUM SERPL-SCNC: 3.7 MMOL/L (ref 3.5–5.1)
PROT SERPL-MCNC: 6.5 G/DL (ref 6–8.4)
PROTHROMBIN TIME: 10.3 SEC (ref 9–12.5)
RBC # BLD AUTO: 3.63 M/UL (ref 4–5.4)
SODIUM SERPL-SCNC: 136 MMOL/L (ref 136–145)
WBC # BLD AUTO: 6.09 K/UL (ref 3.9–12.7)

## 2020-03-04 PROCEDURE — 59025 PR FETAL 2N-STRESS TEST: ICD-10-PCS | Mod: 26,,, | Performed by: PEDIATRICS

## 2020-03-04 PROCEDURE — S5010 5% DEXTROSE AND 0.45% SALINE: HCPCS | Performed by: STUDENT IN AN ORGANIZED HEALTH CARE EDUCATION/TRAINING PROGRAM

## 2020-03-04 PROCEDURE — 85610 PROTHROMBIN TIME: CPT

## 2020-03-04 PROCEDURE — 85025 COMPLETE CBC W/AUTO DIFF WBC: CPT

## 2020-03-04 PROCEDURE — 85730 THROMBOPLASTIN TIME PARTIAL: CPT

## 2020-03-04 PROCEDURE — G0378 HOSPITAL OBSERVATION PER HR: HCPCS

## 2020-03-04 PROCEDURE — 99223 PR INITIAL HOSPITAL CARE,LEVL III: ICD-10-PCS | Mod: 25,,, | Performed by: PEDIATRICS

## 2020-03-04 PROCEDURE — 11000001 HC ACUTE MED/SURG PRIVATE ROOM

## 2020-03-04 PROCEDURE — 63600175 PHARM REV CODE 636 W HCPCS: Performed by: STUDENT IN AN ORGANIZED HEALTH CARE EDUCATION/TRAINING PROGRAM

## 2020-03-04 PROCEDURE — 82248 BILIRUBIN DIRECT: CPT

## 2020-03-04 PROCEDURE — 76820 UMBILICAL ARTERY ECHO: CPT

## 2020-03-04 PROCEDURE — 96361 HYDRATE IV INFUSION ADD-ON: CPT

## 2020-03-04 PROCEDURE — 96372 THER/PROPH/DIAG INJ SC/IM: CPT | Mod: 59

## 2020-03-04 PROCEDURE — 59025 FETAL NON-STRESS TEST: CPT

## 2020-03-04 PROCEDURE — 59025 FETAL NON-STRESS TEST: CPT | Mod: 26,,, | Performed by: PEDIATRICS

## 2020-03-04 PROCEDURE — 80053 COMPREHEN METABOLIC PANEL: CPT

## 2020-03-04 PROCEDURE — 36415 COLL VENOUS BLD VENIPUNCTURE: CPT

## 2020-03-04 PROCEDURE — 83690 ASSAY OF LIPASE: CPT

## 2020-03-04 PROCEDURE — 76815 OB US LIMITED FETUS(S): CPT

## 2020-03-04 PROCEDURE — 25000003 PHARM REV CODE 250: Performed by: STUDENT IN AN ORGANIZED HEALTH CARE EDUCATION/TRAINING PROGRAM

## 2020-03-04 PROCEDURE — 85384 FIBRINOGEN ACTIVITY: CPT

## 2020-03-04 PROCEDURE — 99223 1ST HOSP IP/OBS HIGH 75: CPT | Mod: 25,,, | Performed by: PEDIATRICS

## 2020-03-04 RX ADMIN — PRENATAL VIT W/ FE FUMARATE-FA TAB 27-0.8 MG 1 TABLET: 27-0.8 TAB at 08:03

## 2020-03-04 RX ADMIN — DEXTROSE AND SODIUM CHLORIDE: 5; .45 INJECTION, SOLUTION INTRAVENOUS at 07:03

## 2020-03-04 RX ADMIN — BETAMETHASONE SODIUM PHOSPHATE AND BETAMETHASONE ACETATE 12 MG: 3; 3 INJECTION, SUSPENSION INTRA-ARTICULAR; INTRALESIONAL; INTRAMUSCULAR at 06:03

## 2020-03-04 RX ADMIN — DEXTROSE AND SODIUM CHLORIDE: 5; .45 INJECTION, SOLUTION INTRAVENOUS at 12:03

## 2020-03-04 NOTE — ASSESSMENT & PLAN NOTE
-- CBC in OB ED with platelets of 111  -- Previously platelets had been normal.  -- BP all normal   -- P/C ratio 0.10  -- Patient reporting vague symptoms over the course of the past few weeks -- headache, shortness of breath. Intermittent hypoxia overnight, will get CXR.  -- Given thrombocytopenia and elevated transaminases, preeclampsia on the differential. However normal BP make this less likely.  -- BP normal overnight, AM labs stable

## 2020-03-04 NOTE — ASSESSMENT & PLAN NOTE
-- Patient with generalized abdominal pain, nausea, vomiting, and decreased appetite over the past few days  -- IV fluids and zofran given in OB ED with improvement in symptoms  -- Flu swab negative  -- No elevated white blood cell count  -- CMP remarkable for elevated AST/ALT: 208/81   -- On chart review, transaminases have been elevated on prior studies but never this high   -- RUQ ultrasound as above: echogenic enlarged liver   -- Hepatitis panel pending   -- Coags within normal limits   -- Will repeat labs in AM  -- NPO  -- Zofran, phenergan PRN

## 2020-03-04 NOTE — ASSESSMENT & PLAN NOTE
-- 25w6d today,   -- MILLYDHARMESH  @ 1930 on 3/4, will complete course today  -- Most recent ultrasound on  revealed normal anatomy with EFW in 56% (695 g), repeat M US today  -- Continuous fetal monitoring overnight  -- History of 2 prior c-sections, plan for delivery is repeat . Fetus is transverse.  -- Holding magnesium at this time, do not believe delivery is imminent

## 2020-03-04 NOTE — PROGRESS NOTES
03/04/20 1500   [facility] Skinny Note - Strip   Strip Reviewed by Skinny Nurse? Yes   [facility] Skinny Note - Communication   [place] Nurse Communicated with Bedside Nurse Regarding: Fetal Status  (fetal heart tones off monitor)   [facility] Skinny Note - Notification   Nurse Notified? Yes  (will assess)   Name of Nurse Manan

## 2020-03-04 NOTE — PLAN OF CARE
VSS. Pt c/o persistent mild SOB, swelling up upper extremities, and headache. Headache relieved by compazine. Pt resting. Plan of care reviewed and all questions answered. Will continue to monitor.

## 2020-03-04 NOTE — HOSPITAL COURSE
03/03/2020 - pt admitted for observation in setting of new onset abd pain, N/V with elevated LFTs 208/81 and liver US findings (suggestive steatosis or other infiltrative process). Previous +antibody c and E. Anti c ab too weak to titer. Anti E titer increased from 1:2 to 1:64  03/04/2020 - pt reports improvement in N/V overnight. Formal MFM ultrasound today. Hep panel pending. AST//78. Hep panel resulted negative. MFM scan with normal growth, normal MCA dopplers.   03/05/2020 - AST/ALT improved to 63/61. Pt reports significant improvement in symptoms. Reassuring fetal monitoring. Stable for d/c. Follow up scheduled for 3/10 with Dr. Traore

## 2020-03-04 NOTE — ASSESSMENT & PLAN NOTE
-- Patient with positive antibody screen initially on 11/27   -- Anti E antibody with titer 1:2    -- Anti c antibody not titered  -- Titers were not repeated initially  -- Patient seen in clinic on 3/2, titers ordered at that time  -- Anti E titer now 1:64, anti c not titered  -- Called blood bank, little c antibody was too weak to titer  -- Patient without history of blood transfusion  -- Unsure if she had +antibodies in prior pregnancies   -- Father of this baby is the same father of her other children. Unsure if he has +antibodies.  -- Bedside ultrasound in OB ED without evidence of hydrops. Will get formal [facility] ultrasound tomorrow.  -- Consider antibody screen for FOB.

## 2020-03-04 NOTE — ASSESSMENT & PLAN NOTE
-- Patient with generalized abdominal pain, nausea, vomiting, and decreased appetite over the past few days  -- IV fluids and zofran given in OB ED with improvement in symptoms  -- Flu swab negative  -- No elevated white blood cell count  -- CMP remarkable for elevated AST/ALT: 208/81   -- On chart review, transaminases have been elevated on prior studies but never this high   -- Glucose normal at 96   -- RUQ ultrasound as above: echogenic enlarged liver   -- Hepatitis panel pending   -- Coags within normal limits   -- Repeat AST/ALT: 113/78  -- NPO  -- Zofran, phenergan PRN

## 2020-03-04 NOTE — SUBJECTIVE & OBJECTIVE
Obstetric HPI:  Patient denies ctx, VB, LOF. Reports improvement in nausea/vomiting. Patient just complaining of fatigue this AM. Reports mild SOB, denies CP.     Objective:     Vital Signs (Most Recent):  Temp: 96.1 °F (35.6 °C) (03/04/20 0325)  Pulse: 82 (03/04/20 0330)  BP: (!) 95/59 (03/04/20 0330)  SpO2: 95 % (03/04/20 0315) Vital Signs (24h Range):  Temp:  [96.1 °F (35.6 °C)-98.6 °F (37 °C)] 96.1 °F (35.6 °C)  Pulse:  [] 82  SpO2:  [95 %-99 %] 95 %  BP: ()/(55-67) 95/59        There is no height or weight on file to calculate BMI.    FHT: 140 Cat 1 (reassuring)  TOCO:  none      Intake/Output Summary (Last 24 hours) at 3/4/2020 0619  Last data filed at 3/4/2020 0504  Gross per 24 hour   Intake 2618.75 ml   Output 1300 ml   Net 1318.75 ml       Cervical Exam:deferred     Significant Labs:  Recent Lab Results       03/04/20  0534   03/03/20  1625   03/03/20  1426   03/03/20  1422   03/03/20  1421        Influenza A, Molecular     Negative         Influenza B, Molecular     Negative         Color, UA               Bilirubin               Spec Grav UA               pH, UA               Protein               Urobilinogen, UA               Nitrite, UA               Albumin 3.2     3.4       Alkaline Phosphatase 63     61       ALT 78     81       Anion Gap 9     11       Appearance, UA               aPTT   31.7  Comment:  aPTT therapeutic range = 39-69 seconds                208       Baso # 0.01     0.02       Basophil% 0.2     0.2       Bilirubin (UA)               Bilirubin, Direct 0.2 0.2           BILIRUBIN TOTAL 0.4  Comment:  For infants and newborns, interpretation of results should be based  on gestational age, weight and in agreement with clinical  observations.  Premature Infant recommended reference ranges:  Up to 24 hours.............<8.0 mg/dL  Up to 48 hours............<12.0 mg/dL  3-5 days..................<15.0 mg/dL  6-29 days.................<15.0 mg/dL       0.5  Comment:  For  infants and newborns, interpretation of results should be based  on gestational age, weight and in agreement with clinical  observations.  Premature Infant recommended reference ranges:  Up to 24 hours.............<8.0 mg/dL  Up to 48 hours............<12.0 mg/dL  3-5 days..................<15.0 mg/dL  6-29 days.................<15.0 mg/dL         BUN, Bld 4     7       Calcium 8.4     8.8       Chloride 108     106       CO2 19     20       Color, UA               Creatinine 0.5     0.6       Creatinine, Random Ur               Differential Method Automated     Automated       eGFR if  >60     >60       eGFR if non  >60  Comment:  Calculation used to obtain the estimated glomerular filtration  rate (eGFR) is the CKD-EPI equation.        >60  Comment:  Calculation used to obtain the estimated glomerular filtration  rate (eGFR) is the CKD-EPI equation.          Eos # 0.0     0.1       Eosinophil% 0.2     0.7       Fibrinogen   386           Flu A & B Source     Nasal Swab         Glucose 159     96       Glucose, UA               Glucose, UA               Gran # (ANC) 5.2     8.1       Gran% 85.1     88.8       Hematocrit 32.6     35.4       Hemoglobin 10.6     11.8       Immature Grans (Abs) 0.11  Comment:  Mild elevation in immature granulocytes is non specific and   can be seen in a variety of conditions including stress response,   acute inflammation, trauma and pregnancy. Correlation with other   laboratory and clinical findings is essential.       0.08  Comment:  Mild elevation in immature granulocytes is non specific and   can be seen in a variety of conditions including stress response,   acute inflammation, trauma and pregnancy. Correlation with other   laboratory and clinical findings is essential.         Immature Granulocytes 1.8     0.9       INR   0.9  Comment:  Coumadin Therapy:  2.0 - 3.0 for INR for all indicators except mechanical heart valves  and antiphospholipid  syndromes which should use 2.5 - 3.5.             Ketones, UA         +++large     LD   194  Comment:  Results are increased in hemolyzed samples.              194  Comment:  Results are increased in hemolyzed samples.           Leukocytes, UA               Lipase 7 7           Lymph # 0.6     0.6       Lymph% 9.4     6.5       MCH 29.2     29.6       MCHC 32.5     33.3       MCV 90     89       Mono # 0.2     0.3       Mono% 3.3     2.9       MPV 12.9     12.8       NITRITE UA               nRBC 0     0       Occult Blood UA               pH, UA               Platelets 100     111       Potassium 3.7     4.1  Comment:  Specimen moderately hemolyzed       Prot/Creat Ratio, Ur               PROTEIN TOTAL 6.5     7.3       Protein, UA               Protein, Urine Random               Protime   10.0           RBC 3.63     3.98       RBC, UA               RDW 14.0     14.1       Sodium 136     137       Specific [place], UA               Specimen UA               UROBILINOGEN UA               WBC, UA               WBC 6.09     9.10                        03/03/20  1420   03/03/20  1400        Influenza A, Molecular         Influenza B, Molecular         Color, UA         Bilirubin         Spec Grav UA         pH, UA         Protein         Urobilinogen, UA         Nitrite, UA         Albumin         Alkaline Phosphatase         ALT         Anion Gap         Appearance, UA Clear       aPTT         AST         Baso #         Basophil%         Bilirubin (UA) Negative       Bilirubin, Direct         BILIRUBIN TOTAL         BUN, Bld         Calcium         Chloride         CO2         Color, UA Yellow       Creatinine         Creatinine, Random Ur   126.1  Comment:  The random urine reference ranges provided were established   for 24 hour urine collections.  No reference ranges exist for  random urine specimens.  Correlate clinically.       Differential Method         eGFR if          eGFR if non   American         Eos #         Eosinophil%         Fibrinogen         Flu A & B Source         Glucose         Glucose, UA         Glucose, UA Negative       Gran # (ANC)         Gran%         Hematocrit         Hemoglobin         Immature Grans (Abs)         Immature Granulocytes         INR         Ketones, UA 2+       LD         Leukocytes, UA Negative       Lipase         Lymph #         Lymph%         MCH         MCHC         MCV         Mono #         Mono%         MPV         NITRITE UA Negative       nRBC         Occult Blood UA Negative       pH, UA 6.0       Platelets         Potassium         Prot/Creat Ratio, Ur   0.10     PROTEIN TOTAL         Protein, UA Negative  Comment:  Recommend a 24 hour urine protein or a urine   protein/creatinine ratio if globulin induced proteinuria is  clinically suspected.         Protein, Urine Random   12  Comment:  The random urine reference ranges provided were established   for 24 hour urine collections.  No reference ranges exist for  random urine specimens.  Correlate clinically.       Protime         RBC         RBC, UA         RDW         Sodium         Specific Gravity, UA 1.025       Specimen UA Urine, Clean Catch       UROBILINOGEN UA Negative       WBC, UA         WBC               Physical Exam:   Constitutional: She is oriented to person, place, and time. She appears well-developed and well-nourished. No distress.    HENT:   Head: Normocephalic and atraumatic.    Eyes: Conjunctivae and EOM are normal.    Neck: Normal range of motion. Neck supple. No thyromegaly present.    Cardiovascular: Normal rate and regular rhythm.     Pulmonary/Chest: Effort normal. No respiratory distress.        Abdominal: Soft. She exhibits no distension. There is no tenderness.   Gravid, NT             Musculoskeletal: Normal range of motion and moves all extremeties. She exhibits no edema or tenderness.       Neurological: She is alert and oriented to person, place, and time.     Skin: Skin is warm and dry. No rash noted.    Psychiatric: She has a normal mood and affect. Her behavior is normal.

## 2020-03-04 NOTE — SUBJECTIVE & OBJECTIVE
OB History    Para Term  AB Living   4 2 2 0 1 2   SAB TAB Ectopic Multiple Live Births   0 0 1 0 2      # Outcome Date GA Lbr King/2nd Weight Sex Delivery Anes PTL Lv   4 Current            3 Term 10/10/17 39w0d  4.451 kg (9 lb 13 oz) F CS-LTranv Spinal, EPI N GAUDENCIO      Name: RUBIN RIVERA      Apgar1: 6  Apgar5: 9   2 Ectopic 2015     ECTOPIC      1 Term 14 40w5d  4.326 kg (9 lb 8.6 oz) F CS-LTranv EPI N GAUDENCIO      Apgar1: 9  Apgar5: 9     Past Medical History:   Diagnosis Date    Arthritis     spinal    Asthma     was on advair , resolved    Benign tumor of spinal cord     Depression     Ectopic pregnancy     GERD (gastroesophageal reflux disease)     Pre-diabetes     Urticaria      Past Surgical History:   Procedure Laterality Date    APPENDECTOMY       SECTION      x2    ECTOPIC PREGNANCY SURGERY      posteriorlaminectomy      For benign spinal tumor;        PTA Medications   Medication Sig    albuterol (PROAIR HFA) 90 mcg/actuation inhaler Inhale 2 puffs into the lungs every 6 (six) hours as needed for Wheezing or Shortness of Breath. Rescue    prenatal vit,calc76/iron/folic (PNV 29-1 ORAL) Take by mouth.       Review of patient's allergies indicates:  No Known Allergies     Family History     Problem Relation (Age of Onset)    Hepatitis Father    Hypertension Mother        Tobacco Use    Smoking status: Former Smoker     Packs/day: 1.00     Years: 13.00     Pack years: 13.00     Types: Cigarettes     Last attempt to quit: 10/20/2012     Years since quittin.3    Smokeless tobacco: Former User   Substance and Sexual Activity    Alcohol use: No     Frequency: 2-4 times a month     Drinks per session: 1 or 2     Binge frequency: Never    Drug use: No    Sexual activity: Yes     Partners: Male     Birth control/protection: None     Review of Systems   Constitutional: Positive for chills. Negative for fever.   Eyes: Negative for visual disturbance.    Respiratory: Negative for shortness of breath.    Cardiovascular: Negative for chest pain and palpitations.   Gastrointestinal: Positive for abdominal pain, nausea and vomiting. Negative for constipation and diarrhea.   Genitourinary: Negative for dysuria, pelvic pain, vaginal bleeding and vaginal discharge.   Musculoskeletal: Negative for back pain.   Integumentary:  Negative for rash.   Neurological: Negative for seizures, syncope and headaches.   Hematological: Does not bruise/bleed easily.   Psychiatric/Behavioral: Negative for depression. The patient is not nervous/anxious.       Objective:     Vital Signs (Most Recent):  Temp: 98.6 °F (37 °C) (03/03/20 1727)  Pulse: 98 (03/03/20 1743)  BP: 115/67 (03/03/20 1743)  SpO2: 98 % (03/03/20 1741) Vital Signs (24h Range):  Temp:  [97 °F (36.1 °C)-98.6 °F (37 °C)] 98.6 °F (37 °C)  Pulse:  [] 98  SpO2:  [95 %-99 %] 98 %  BP: (101-132)/(59-67) 115/67        There is no height or weight on file to calculate BMI.    FHT: 150 bpm, moderate variability, +accels, no decels Cat 1 (reassuring)  TOCO: None    Physical Exam:   Constitutional: She is oriented to person, place, and time. She appears well-developed and well-nourished. No distress.    HENT:   Head: Normocephalic and atraumatic.   Nose: No epistaxis.    Eyes: Conjunctivae and EOM are normal.    Neck: Normal range of motion. Neck supple. No thyromegaly present.    Cardiovascular: Normal rate and regular rhythm.     Pulmonary/Chest: Effort normal. No respiratory distress.        Abdominal: Soft. She exhibits no distension. There is no tenderness.   Gravid             Musculoskeletal: Normal range of motion and moves all extremeties. She exhibits no edema or tenderness.       Neurological: She is alert and oriented to person, place, and time.    Skin: Skin is warm and dry. No rash noted.    Psychiatric: She has a normal mood and affect. Her behavior is normal.     Significant Labs:  Lab Results   Component Value  Date    GROUPTRH O POS 03/02/2020    HEPBSAG Negative 11/07/2019    STREPBCULT No Group B Streptococcus isolated 09/18/2017       CBC:   Recent Labs   Lab 03/03/20  1422   WBC 9.10   RBC 3.98*   HGB 11.8*   HCT 35.4*   *   MCV 89   MCH 29.6   MCHC 33.3     CMP:   Recent Labs   Lab 03/03/20  1422   GLU 96   CALCIUM 8.8   ALBUMIN 3.4*   PROT 7.3      K 4.1   CO2 20*      BUN 7   CREATININE 0.6   ALKPHOS 61   ALT 81*   *   BILITOT 0.5     Coags WNL    Hepatitis panel pending    I have personallly reviewed all pertinent lab results from the last 24 hours.      RUQ US: Diffusely echogenic appearance of the hepatic parenchyma, findings suggest steatosis or other infiltrative process.  Correlation with LFTs advised noting hepatomegaly.

## 2020-03-04 NOTE — ANESTHESIA PREPROCEDURE EVALUATION
[facility]  Anesthesia Pre-Operative Evaluation         Patient Name: Ann Godfrey  YOB: 1980  MRN: [id number]    2020      Ann Godfrey is an obese 39 y.o. female  @ 25w5d with PMHx of asthma and GERD who presented to [facility] with abdominal pain, N/V, and malaise. Pregnancy complicated by AMA, abnormal blood antibody titer (Anti E1:64 and Anti c no titer), and h/o prior  x2. U/S significant for hepatomegaly with elevated LFTs (elevated in the past), plt 111 (from 194 on ), hemolytic labs wnl, PCR wnl, and remains normotensive. She does endorse mild SOB and edema. OB to monitor and trend labs, no emergent plan for delivery.    OB History    Para Term  AB Living   4 2 2 0 1 2   SAB TAB Ectopic Multiple Live Births   0 0 1 0 2      # Outcome Date GA Lbr King/2nd Weight Sex Delivery Anes PTL Lv   4 Current            3 Term 10/10/17 39w0d  4.451 kg (9 lb 13 oz) F CS-LTranv Spinal, EPI N GAUDECNIO   2 Ectopic 2015     ECTOPIC      1 Term 14 40w5d  4.326 kg (9 lb 8.6 oz) F CS-LTranv EPI N GAUDENCIO       Review of patient's allergies indicates:  No Known Allergies    Wt Readings from Last 1 Encounters:   20 1309 115.1 kg (253 lb 12 oz)       BP Readings from Last 3 Encounters:   20 115/67   20 120/78   20 118/78       Patient Active Problem List   Diagnosis    Back pain, lumbosacral    Benign tumor of spinal cord    Undergoing workup for MS    GERD (gastroesophageal reflux disease)    Depression    Asthma    Urticaria    Obesity (BMI 35.0-39.9 without comorbidity)    Pre-diabetes    Early satiety    Cyst of left ovary    Pregnancy, supervision, high-risk - flu    Advanced maternal age in multigravida/declines genetics.    Maternal asthma complicating pregnancy    Obesity complicating pregnancy    Abnormal antibody titer - anti-E 1:2    Maternal atypical antibody affecting pregnancy in second  trimester - Anti E and Anti c    Non-intractable vomiting with nausea    Abdominal pain       Past Surgical History:   Procedure Laterality Date    APPENDECTOMY       SECTION      x2    ECTOPIC PREGNANCY SURGERY      posteriorlaminectomy      For benign spinal tumor;        Social History     Socioeconomic History    Marital status:      Spouse name: Not on file    Number of children: Not on file    Years of education: Not on file    Highest education level: Not on file   Occupational History    Not on file   Social Needs    Financial resource strain: Somewhat hard    Food insecurity:     Worry: Never true     Inability: Never true    Transportation needs:     Medical: No     Non-medical: No   Tobacco Use    Smoking status: Former Smoker     Packs/day: 1.00     Years: 13.00     Pack years: 13.00     Types: Cigarettes     Last attempt to quit: 10/20/2012     Years since quittin.3    Smokeless tobacco: Former User   Substance and Sexual Activity    Alcohol use: No     Frequency: 2-4 times a month     Drinks per session: 1 or 2     Binge frequency: Never    Drug use: No    Sexual activity: Yes     Partners: Male     Birth control/protection: None   Lifestyle    Physical activity:     Days per week: 2 days     Minutes per session: Not on file    Stress: To some extent   Relationships    Social connections:     Talks on phone: More than three times a week     Gets together: Once a week     Attends Oriental orthodox service: Not on file     Active member of club or organization: Yes     Attends meetings of clubs or organizations: More than 4 times per year     Relationship status:    Other Topics Concern    Not on file   Social History Narrative    Not on file         Chemistry        Component Value Date/Time     2020 1422    K 4.1 2020 1422     2020 1422    CO2 20 (L) 2020 1422    BUN 7 2020 1422    CREATININE 0.6 2020 1422    GLU  96 03/03/2020 1422        Component Value Date/Time    CALCIUM 8.8 03/03/2020 1422    ALKPHOS 61 03/03/2020 1422     (H) 03/03/2020 1422    ALT 81 (H) 03/03/2020 1422    BILITOT 0.5 03/03/2020 1422    ESTGFRAFRICA >60 03/03/2020 1422    EGFRNONAA >60 03/03/2020 1422            Lab Results   Component Value Date    WBC 9.10 03/03/2020    HGB 11.8 (L) 03/03/2020    HCT 35.4 (L) 03/03/2020    MCV 89 03/03/2020     (L) 03/03/2020       Recent Labs     03/03/20  1625   INR 0.9   APTT 31.7           Anesthesia Evaluation    I have reviewed the Patient Summary Reports.     I have reviewed the Medications.     Review of Systems  Anesthesia Hx:  No previous Anesthesia  History of prior surgery of interest to airway management or planning: Denies Family Hx of Anesthesia complications.   Denies Personal Hx of Anesthesia complications.   Social:  Former Smoker    Hematology/Oncology:         -- Anemia:   Cardiovascular:  Cardiovascular Normal     Pulmonary:   Asthma    Renal/:  Renal/ Normal     Hepatic/GI:   GERD    Musculoskeletal:  Musculoskeletal Normal    Neurological:  Neurology Normal    Endocrine:  Endocrine Normal        Physical Exam  General:  Well nourished, Obesity    Airway/Jaw/Neck:  Airway Findings: Mouth Opening: Normal Tongue: Normal  General Airway Assessment: Adult  Mallampati: I  Jaw/Neck Findings:  Neck ROM: Normal ROM      Dental:  Dental Findings:    Chest/Lungs:  Chest/Lungs Findings: Normal Respiratory Rate     Heart/Vascular:  Heart Findings: Rate: Normal  Rhythm: Regular Rhythm        Mental Status:  Mental Status Findings:  Cooperative, Alert and Oriented         Anesthesia Plan  Type of Anesthesia, risks & benefits discussed:  Anesthesia Type:  CSE, epidural, general, spinal  Patient's Preference:   Intra-op Monitoring Plan: standard ASA monitors  Intra-op Monitoring Plan Comments:   Post Op Pain Control Plan: multimodal analgesia, IV/PO Opioids PRN and per primary service  following discharge from PACU  Post Op Pain Control Plan Comments:   Induction:    Beta Blocker:  Patient is not currently on a Beta-Blocker (No further documentation required).       Informed Consent: Patient understands risks and agrees with Anesthesia plan.  Questions answered. Anesthesia consent signed with patient.  ASA Score: 2     Day of Surgery Review of History & Physical:    H&P update referred to the provider.         Ready For Surgery From Anesthesia Perspective.

## 2020-03-04 NOTE — HPI
Ann Godfrey is a 39 y.o. [id number] at 25w5d who presents complaining of abdominal pain, N/V, dizziness/lightheadedness, myalgias, and generalized malaise. The abdominal pain is generalized, worse in the lower quadrants. Reports two episodes of vomiting this AM; denies hematemesis. Had one large volume loose stool this AM but denies recurrent episodes of diarrhea. Was able to tolerate p.o. as of yesterday. Reports subjective F/C over the past several hours but has not taken her temperature. Also endorses blurred vision and HA over the past 2 weeks; reports HA  relieved with Tylenol. Reports mild SOB but denies any CP or new onset swelling. Also endorses frequent nose bleeds over the past several weeks. Per chart review, patient has history of significant menorrhagia outside of pregnancy.     This IUP is complicated by obesity, AMA, abnormal blood antibody titer (Anti E1:64 and Anti c no titer), asthma, and history of prior  x2.  Patient denies contractions, vaginal bleeding, LOF.  Fetal Movement: normal.

## 2020-03-04 NOTE — ASSESSMENT & PLAN NOTE
-- Patient with positive antibody screen initially on 11/27   -- Anti E antibody with titer 1:2    -- Anti c antibody not titered  -- Titers were not repeated initially  -- Patient seen in clinic on 3/2, titers ordered at that time  -- Anti E titer now 1:64, anti c not titered  -- Called blood bank, little c antibody was too weak to titer  -- Patient without history of blood transfusion  -- Unsure if she had +antibodies in prior pregnancies   -- Father of this baby is the same father of her other children. Unsure if he has +antibodies.  -- Bedside ultrasound in OB ED without evidence of hydrops. Formal MFM US today  -- Consider antibody screen for FOB.

## 2020-03-04 NOTE — ASSESSMENT & PLAN NOTE
-- Prepregnancy BMI 38  -- TEDs, SCDs while admitted  -- OB glucose screen negative in first trimester and on 3/2

## 2020-03-04 NOTE — CARE UPDATE
Counseled patient on ACOG recommendations regarding imaging in pregnancy ():     The American College of Obstetricians and Gynecologists Committee on Obstetric Practice makes the following recommendations regarding diagnostic imaging procedures during pregnancy and lactation:     Ultrasonography and magnetic resonance imaging (MRI) are not associated with risk and are the imaging techniques of choice for the pregnant patient, but they should be used prudently and only when use is expected to answer a relevant clinical question or otherwise provide medical benefit to the patient.     With few exceptions, radiation exposure through radiography, computed tomography (CT) scan, or nuclear medicine imaging techniques is at a dose much lower than the exposure associated with fetal harm. If these techniques are necessary in addition to ultrasonography or MRI or are more readily available for the diagnosis in question, they should not be withheld from a pregnant patient.     The use of gadolinium contrast with MRI should be limited; it may be used as a contrast agent in a pregnant woman only if it significantly improves diagnostic performance and is expected to improve fetal or maternal outcome.     Breastfeeding should not be interrupted after gadolinium administration or for iodinated IV contrast for CT               Li Quesada MD  OBGYN PGY-2

## 2020-03-04 NOTE — ASSESSMENT & PLAN NOTE
-- 25w5d today, has not received BMZ  -- Will give course of BMZ now  -- Most recent ultrasound on  revealed normal anatomy with EFW in 56% (695 g)  -- Continuous fetal monitoring overnight  -- History of 2 prior c-sections, plan for delivery is repeat . Fetus is transverse.  -- Holding magnesium at this time, do not believe delivery is imminent

## 2020-03-04 NOTE — PROGRESS NOTES
[facility]  Obstetrics  Antepartum Progress Note    Patient Name: Ann Godfrey  MRN: [id number]  Admission Date: 3/3/2020  Hospital Length of Stay: 1 days  Attending Physician: Jasmin Rowell MD  Primary Care Provider: Radha Mckeon MD    Subjective:     Principal Problem:Maternal atypical antibody affecting pregnancy in second trimester    HPI:  Ann Godfrey is a 39 y.o. [id number] at 25w5d who presents complaining of abdominal pain, N/V, dizziness/lightheadedness, myalgias, and generalized malaise. The abdominal pain is generalized, worse in the lower quadrants. Reports two episodes of vomiting this AM; denies hematemesis. Had one large volume loose stool this AM but denies recurrent episodes of diarrhea. Was able to tolerate p.o. as of yesterday. Reports subjective F/C over the past several hours but has not taken her temperature. Also endorses blurred vision and HA over the past 2 weeks; reports HA  relieved with Tylenol. Reports mild SOB but denies any CP or new onset swelling. Also endorses frequent nose bleeds over the past several weeks. Per chart review, patient has history of significant menorrhagia outside of pregnancy.     This IUP is complicated by obesity, AMA, abnormal blood antibody titer (Anti E1:64 and Anti c no titer), asthma, and history of prior  x2.  Patient denies contractions, vaginal bleeding, LOF.  Fetal Movement: normal.    Hospital Course:  2020 - pt admitted for observation in setting of new onset abd pain, N/V with elevated LFTs 208/81 and liver US findings (suggestive steatosis or other infiltrative process). Previous +antibody c and E. Anti c ab too weak to titer. Anti E titer increased from 1:2 to 1:64  2020 - pt reports improvement in N/V overnight. Formal MFM ultrasound today. Hep panel pending. AST//78     Obstetric HPI:  Patient denies ctx, VB, LOF. Reports improvement in nausea/vomiting. Patient just  complaining of fatigue this AM. Reports mild SOB, denies CP.     Objective:     Vital Signs (Most Recent):  Temp: 96.1 °F (35.6 °C) (03/04/20 0325)  Pulse: 82 (03/04/20 0330)  BP: (!) 95/59 (03/04/20 0330)  SpO2: 95 % (03/04/20 0315) Vital Signs (24h Range):  Temp:  [96.1 °F (35.6 °C)-98.6 °F (37 °C)] 96.1 °F (35.6 °C)  Pulse:  [] 82  SpO2:  [95 %-99 %] 95 %  BP: ()/(55-67) 95/59        There is no height or weight on file to calculate BMI.    FHT: 140 Cat 1 (reassuring)  TOCO:  none      Intake/Output Summary (Last 24 hours) at 3/4/2020 0619  Last data filed at 3/4/2020 0504  Gross per 24 hour   Intake 2618.75 ml   Output 1300 ml   Net 1318.75 ml       Cervical Exam:deferred     Significant Labs:  Recent Lab Results       03/04/20  0534   03/03/20  1625   03/03/20  1426   03/03/20  1422   03/03/20  1421        Influenza A, Molecular     Negative         Influenza B, Molecular     Negative         Color, UA               Bilirubin               Spec Grav UA               pH, UA               Protein               Urobilinogen, UA               Nitrite, UA               Albumin 3.2     3.4       Alkaline Phosphatase 63     61       ALT 78     81       Anion Gap 9     11       Appearance, UA               aPTT   31.7  Comment:  aPTT therapeutic range = 39-69 seconds                208       Baso # 0.01     0.02       Basophil% 0.2     0.2       Bilirubin (UA)               Bilirubin, Direct 0.2 0.2           BILIRUBIN TOTAL 0.4  Comment:  For infants and newborns, interpretation of results should be based  on gestational age, weight and in agreement with clinical  observations.  Premature Infant recommended reference ranges:  Up to 24 hours.............<8.0 mg/dL  Up to 48 hours............<12.0 mg/dL  3-5 days..................<15.0 mg/dL  6-29 days.................<15.0 mg/dL       0.5  Comment:  For infants and newborns, interpretation of results should be based  on gestational age, weight and in  agreement with clinical  observations.  Premature Infant recommended reference ranges:  Up to 24 hours.............<8.0 mg/dL  Up to 48 hours............<12.0 mg/dL  3-5 days..................<15.0 mg/dL  6-29 days.................<15.0 mg/dL         BUN, Bld 4     7       Calcium 8.4     8.8       Chloride 108     106       CO2 19     20       Color, UA               Creatinine 0.5     0.6       Creatinine, Random Ur               Differential Method Automated     Automated       eGFR if  >60     >60       eGFR if non  >60  Comment:  Calculation used to obtain the estimated glomerular filtration  rate (eGFR) is the CKD-EPI equation.        >60  Comment:  Calculation used to obtain the estimated glomerular filtration  rate (eGFR) is the CKD-EPI equation.          Eos # 0.0     0.1       Eosinophil% 0.2     0.7       Fibrinogen   386           Flu A & B Source     Nasal Swab         Glucose 159     96       Glucose, UA               Glucose, UA               Gran # (ANC) 5.2     8.1       Gran% 85.1     88.8       Hematocrit 32.6     35.4       Hemoglobin 10.6     11.8       Immature Grans (Abs) 0.11  Comment:  Mild elevation in immature granulocytes is non specific and   can be seen in a variety of conditions including stress response,   acute inflammation, trauma and pregnancy. Correlation with other   laboratory and clinical findings is essential.       0.08  Comment:  Mild elevation in immature granulocytes is non specific and   can be seen in a variety of conditions including stress response,   acute inflammation, trauma and pregnancy. Correlation with other   laboratory and clinical findings is essential.         Immature Granulocytes 1.8     0.9       INR   0.9  Comment:  Coumadin Therapy:  2.0 - 3.0 for INR for all indicators except mechanical heart valves  and antiphospholipid syndromes which should use 2.5 - 3.5.             Ketones, UA         +++large     LD    194  Comment:  Results are increased in hemolyzed samples.              194  Comment:  Results are increased in hemolyzed samples.           Leukocytes, UA               Lipase 7 7           Lymph # 0.6     0.6       Lymph% 9.4     6.5       MCH 29.2     29.6       MCHC 32.5     33.3       MCV 90     89       Mono # 0.2     0.3       Mono% 3.3     2.9       MPV 12.9     12.8       NITRITE UA               nRBC 0     0       Occult Blood UA               pH, UA               Platelets 100     111       Potassium 3.7     4.1  Comment:  Specimen moderately hemolyzed       Prot/Creat Ratio, Ur               PROTEIN TOTAL 6.5     7.3       Protein, UA               Protein, Urine Random               Protime   10.0           RBC 3.63     3.98       RBC, UA               RDW 14.0     14.1       Sodium 136     137       Specific [place], UA               Specimen UA               UROBILINOGEN UA               WBC, UA               WBC 6.09     9.10                        03/03/20  1420   03/03/20  1400        Influenza A, Molecular         Influenza B, Molecular         Color, UA         Bilirubin         Spec Grav UA         pH, UA         Protein         Urobilinogen, UA         Nitrite, UA         Albumin         Alkaline Phosphatase         ALT         Anion Gap         Appearance, UA Clear       aPTT         AST         Baso #         Basophil%         Bilirubin (UA) Negative       Bilirubin, Direct         BILIRUBIN TOTAL         BUN, Bld         Calcium         Chloride         CO2         Color, UA Yellow       Creatinine         Creatinine, Random Ur   126.1  Comment:  The random urine reference ranges provided were established   for 24 hour urine collections.  No reference ranges exist for  random urine specimens.  Correlate clinically.       Differential Method         eGFR if          eGFR if non          Eos #         Eosinophil%         Fibrinogen         Flu A & B Source          Glucose         Glucose, UA         Glucose, UA Negative       Gran # (ANC)         Gran%         Hematocrit         Hemoglobin         Immature Grans (Abs)         Immature Granulocytes         INR         Ketones, UA 2+       LD         Leukocytes, UA Negative       Lipase         Lymph #         Lymph%         MCH         MCHC         MCV         Mono #         Mono%         MPV         NITRITE UA Negative       nRBC         Occult Blood UA Negative       pH, UA 6.0       Platelets         Potassium         Prot/Creat Ratio, Ur   0.10     PROTEIN TOTAL         Protein, UA Negative  Comment:  Recommend a 24 hour urine protein or a urine   protein/creatinine ratio if globulin induced proteinuria is  clinically suspected.         Protein, Urine Random   12  Comment:  The random urine reference ranges provided were established   for 24 hour urine collections.  No reference ranges exist for  random urine specimens.  Correlate clinically.       Protime         RBC         RBC, UA         RDW         Sodium         Specific Gravity, UA 1.025       Specimen UA Urine, Clean Catch       UROBILINOGEN UA Negative       WBC, UA         WBC               Physical Exam:   Constitutional: She is oriented to person, place, and time. She appears well-developed and well-nourished. No distress.    HENT:   Head: Normocephalic and atraumatic.    Eyes: Conjunctivae and EOM are normal.    Neck: Normal range of motion. Neck supple. No thyromegaly present.    Cardiovascular: Normal rate and regular rhythm.     Pulmonary/Chest: Effort normal. No respiratory distress.        Abdominal: Soft. She exhibits no distension. There is no tenderness.   Gravid, NT             Musculoskeletal: Normal range of motion and moves all extremeties. She exhibits no edema or tenderness.       Neurological: She is alert and oriented to person, place, and time.    Skin: Skin is warm and dry. No rash noted.    Psychiatric: She has a normal mood and affect.  Her behavior is normal.       Assessment/Plan:     39 y.o. female  at 25w6d for:    * Maternal atypical antibody affecting pregnancy in second trimester - Anti E and Anti c  -- Patient with positive antibody screen initially on    -- Anti E antibody with titer 1:2    -- Anti c antibody not titered  -- Titers were not repeated initially  -- Patient seen in clinic on 3/2, titers ordered at that time  -- Anti E titer now 1:64, anti c not titered  -- Called blood bank, little c antibody was too weak to titer  -- Patient without history of blood transfusion  -- Unsure if she had +antibodies in prior pregnancies   -- Father of this baby is the same father of her other children. Unsure if he has +antibodies.  -- Bedside ultrasound in OB ED without evidence of hydrops. Formal MFM US today  -- Consider antibody screen for FOB.    Thrombocytopenia affecting pregnancy  -- CBC in OB ED with platelets of 111>100 this AM  -- Previously platelets had been normal.  -- BP all normal   -- P/C ratio 0.10  -- Patient reporting vague symptoms over the course of the past few weeks -- headache, shortness of breath. Intermittent hypoxia overnight, will get CXR.  -- Given thrombocytopenia and elevated transaminases, preeclampsia on the differential. However normal BP make this less likely.  -- BP normal overnight, AM labs stable      Abdominal pain, nausea, vomiting  -- Patient with generalized abdominal pain, nausea, vomiting, and decreased appetite over the past few days  -- IV fluids and zofran given in OB ED with improvement in symptoms  -- Flu swab negative  -- No elevated white blood cell count  -- CMP remarkable for elevated AST/ALT: 208/81   -- On chart review, transaminases have been elevated on prior studies but never this high   -- Glucose normal at 96   -- RUQ ultrasound as above: echogenic enlarged liver   -- Hepatitis panel pending   -- Coags within normal limits   -- Repeat AST/ALT: 113/78  -- NPO  -- Zofran,  phenergan PRN    Advanced maternal age in multigravida/declines genetics.  -- Declined genetic screening  -- Normal anatomy scan    Pregnancy, supervision, high-risk - flu  -- 25w6d today,   -- BMZ  @ 1930 on 3/4, will complete course today  -- Most recent ultrasound on  revealed normal anatomy with EFW in 56% (695 g), repeat MFM US today  -- Continuous fetal monitoring overnight  -- History of 2 prior c-sections, plan for delivery is repeat . Fetus is transverse.  -- Holding magnesium at this time, do not believe delivery is imminent    Obesity (BMI 35.0-39.9 without comorbidity)  -- Prepregnancy BMI 38  -- TEDs, SCDs while admitted  -- OB glucose screen negative in first trimester and on 3/2    Asthma  -- Mild intermittent  -- Albuterol PRN    Depression  -- Currently on no medications        Chaparrita Hoff MD  Obstetrics  [facility]

## 2020-03-04 NOTE — ASSESSMENT & PLAN NOTE
-- CBC in OB ED with platelets of 111  -- Yesterday, platelets 127.  -- Previously platelets had been normal.  -- BP all normal   -- P/C ratio 0.10  -- Patient reporting vague symptoms over the course of the past few weeks -- headache, shortness of breath. Denies these at this time.  -- Given thrombocytopenia and elevated transaminases, preeclampsia on the differential. However normal BP make this less likely.  -- Will continue to monitor BP and will repeat labs in AM

## 2020-03-04 NOTE — ASSESSMENT & PLAN NOTE
-- CBC in OB ED with platelets of 111>100 yesterday, AM labs pending  -- Previously platelets had been normal.  -- BP all normal   -- P/C ratio 0.10  -- Patient reporting vague symptoms over the course of the past few weeks -- headache, shortness of breath. Intermittent hypoxia overnight, will get CXR.  -- Given thrombocytopenia and elevated transaminases, preeclampsia on the differential. However normal BP make this less likely.  -- BP normal overnight, AM labs pending

## 2020-03-04 NOTE — H&P
[facility]  Obstetrics  History & Physical    Patient Name: Dyllan Godfrey  MRN: [id number]  Admission Date: 3/3/2020  Primary Care Provider: Radha Mckeon MD    Subjective:     Principal Problem:Maternal atypical antibody affecting pregnancy in second trimester    History of Present Illness:  Dyllan Godfrey is a 39 y.o. [id number] at 25w5d who presents complaining of abdominal pain, N/V, dizziness/lightheadedness, myalgias, and generalized malaise. The abdominal pain is generalized, worse in the lower quadrants. Reports two episodes of vomiting this AM; denies hematemesis. Had one large volume loose stool this AM but denies recurrent episodes of diarrhea. Was able to tolerate p.o. as of yesterday. Reports subjective F/C over the past several hours but has not taken her temperature. Also endorses blurred vision and HA over the past 2 weeks; reports HA  relieved with Tylenol. Reports mild SOB but denies any CP or new onset swelling. Also endorses frequent nose bleeds over the past several weeks. Per chart review, patient has history of significant menorrhagia outside of pregnancy.     This IUP is complicated by obesity, AMA, abnormal blood antibody titer (Anti E1:64 and Anti c no titer), asthma, and history of prior  x2.  Patient denies contractions, vaginal bleeding, LOF.  Fetal Movement: normal.      OB History    Para Term  AB Living   4 2 2 0 1 2   SAB TAB Ectopic Multiple Live Births   0 0 1 0 2      # Outcome Date GA Lbr King/2nd Weight Sex Delivery Anes PTL Lv   4 Current            3 Term 10/10/17 39w0d  4.451 kg (9 lb 13 oz) F CS-LTranv Spinal, EPI N GAUDENCIO      Name: NICOLE, GIRL DYLLAN      Apgar1: 6  Apgar5: 9   2 Ectopic 2015     ECTOPIC      1 Term 14 40w5d  4.326 kg (9 lb 8.6 oz) F CS-LTranv EPI N GAUDENCIO      Apgar1: 9  Apgar5: 9     Past Medical History:   Diagnosis Date    Arthritis     spinal    Asthma     was on advair , resolved     Benign tumor of spinal cord     Depression     Ectopic pregnancy     GERD (gastroesophageal reflux disease)     Pre-diabetes     Urticaria      Past Surgical History:   Procedure Laterality Date    APPENDECTOMY       SECTION      x2    ECTOPIC PREGNANCY SURGERY      posteriorlaminectomy      For benign spinal tumor;        PTA Medications   Medication Sig    albuterol (PROAIR HFA) 90 mcg/actuation inhaler Inhale 2 puffs into the lungs every 6 (six) hours as needed for Wheezing or Shortness of Breath. Rescue    prenatal vit,calc76/iron/folic (PNV 29-1 ORAL) Take by mouth.       Review of patient's allergies indicates:  No Known Allergies     Family History     Problem Relation (Age of Onset)    Hepatitis Father    Hypertension Mother        Tobacco Use    Smoking status: Former Smoker     Packs/day: 1.00     Years: 13.00     Pack years: 13.00     Types: Cigarettes     Last attempt to quit: 10/20/2012     Years since quittin.3    Smokeless tobacco: Former User   Substance and Sexual Activity    Alcohol use: No     Frequency: 2-4 times a month     Drinks per session: 1 or 2     Binge frequency: Never    Drug use: No    Sexual activity: Yes     Partners: Male     Birth control/protection: None     Review of Systems   Constitutional: Positive for chills. Negative for fever.   Eyes: Negative for visual disturbance.   Respiratory: Negative for shortness of breath.    Cardiovascular: Negative for chest pain and palpitations.   Gastrointestinal: Positive for abdominal pain, nausea and vomiting. Negative for constipation and diarrhea.   Genitourinary: Negative for dysuria, pelvic pain, vaginal bleeding and vaginal discharge.   Musculoskeletal: Negative for back pain.   Integumentary:  Negative for rash.   Neurological: Negative for seizures, syncope and headaches.   Hematological: Does not bruise/bleed easily.   Psychiatric/Behavioral: Negative for depression. The patient is not nervous/anxious.        Objective:     Vital Signs (Most Recent):  Temp: 98.6 °F (37 °C) (03/03/20 1727)  Pulse: 98 (03/03/20 1743)  BP: 115/67 (03/03/20 1743)  SpO2: 98 % (03/03/20 1741) Vital Signs (24h Range):  Temp:  [97 °F (36.1 °C)-98.6 °F (37 °C)] 98.6 °F (37 °C)  Pulse:  [] 98  SpO2:  [95 %-99 %] 98 %  BP: (101-132)/(59-67) 115/67        There is no height or weight on file to calculate BMI.    FHT: 150 bpm, moderate variability, +accels, no decels Cat 1 (reassuring)  TOCO: None    Physical Exam:   Constitutional: She is oriented to person, place, and time. She appears well-developed and well-nourished. No distress.    HENT:   Head: Normocephalic and atraumatic.   Nose: No epistaxis.    Eyes: Conjunctivae and EOM are normal.    Neck: Normal range of motion. Neck supple. No thyromegaly present.    Cardiovascular: Normal rate and regular rhythm.     Pulmonary/Chest: Effort normal. No respiratory distress.        Abdominal: Soft. She exhibits no distension. There is no tenderness.   Gravid             Musculoskeletal: Normal range of motion and moves all extremeties. She exhibits no edema or tenderness.       Neurological: She is alert and oriented to person, place, and time.    Skin: Skin is warm and dry. No rash noted.    Psychiatric: She has a normal mood and affect. Her behavior is normal.     Significant Labs:  Lab Results   Component Value Date    GROUPTRH O POS 03/02/2020    HEPBSAG Negative 11/07/2019    STREPBCULT No Group B Streptococcus isolated 09/18/2017       CBC:   Recent Labs   Lab 03/03/20  1422   WBC 9.10   RBC 3.98*   HGB 11.8*   HCT 35.4*   *   MCV 89   MCH 29.6   MCHC 33.3     CMP:   Recent Labs   Lab 03/03/20  1422   GLU 96   CALCIUM 8.8   ALBUMIN 3.4*   PROT 7.3      K 4.1   CO2 20*      BUN 7   CREATININE 0.6   ALKPHOS 61   ALT 81*   *   BILITOT 0.5     Coags WNL    Hepatitis panel pending    I have personallly reviewed all pertinent lab results from the last 24  hours.      RUQ US: Diffusely echogenic appearance of the hepatic parenchyma, findings suggest steatosis or other infiltrative process.  Correlation with LFTs advised noting hepatomegaly.    Assessment/Plan:     39 y.o. female  at 25w5d for:    * Maternal atypical antibody affecting pregnancy in second trimester - Anti E and Anti c  -- Patient with positive antibody screen initially on    -- Anti E antibody with titer 1:2    -- Anti c antibody not titered  -- Titers were not repeated initially  -- Patient seen in clinic on 3/2, titers ordered at that time  -- Anti E titer now 1:64, anti c not titered  -- Called blood bank, little c antibody was too weak to titer  -- Patient without history of blood transfusion  -- Unsure if she had +antibodies in prior pregnancies   -- Father of this baby is the same father of her other children. Unsure if he has +antibodies.  -- Bedside ultrasound in OB ED without evidence of hydrops. Will get formal M ultrasound tomorrow.  -- Consider antibody screen for FOB.    Abdominal pain, nausea, vomiting  -- Patient with generalized abdominal pain, nausea, vomiting, and decreased appetite over the past few days  -- IV fluids and zofran given in OB ED with improvement in symptoms  -- Flu swab negative  -- No elevated white blood cell count  -- CMP remarkable for elevated AST/ALT: 208/81   -- On chart review, transaminases have been elevated on prior studies but never this high   -- Glucose normal   -- RUQ ultrasound as above: echogenic enlarged liver   -- Hepatitis panel pending   -- Coags within normal limits   -- Will repeat labs in AM  -- NPO  -- Zofran, phenergan PRN    Pregnancy, supervision, high-risk - flu  -- 25w5d today, has not received BMZ  -- Will give course of BMZ now  -- Most recent ultrasound on  revealed normal anatomy with EFW in 56% (695 g)  -- Continuous fetal monitoring overnight  -- History of 2 prior c-sections, plan for delivery is repeat .  Fetus is transverse.  -- Holding magnesium at this time, do not believe delivery is imminent    Thrombocytopenia affecting pregnancy  -- CBC in OB ED with platelets of 111  -- Yesterday, platelets 127.  -- Previously platelets had been normal.  -- BP all normal   -- P/C ratio 0.10  -- Patient reporting vague symptoms over the course of the past few weeks -- headache, shortness of breath. Denies these at this time.  -- Given thrombocytopenia and elevated transaminases, preeclampsia on the differential. However normal BP make this less likely.  -- Will continue to monitor BP and will repeat labs in AM      Obesity (BMI 35.0-39.9 without comorbidity)  -- Prepregnancy BMI 38  -- TEDs, SCDs while admitted  -- OB glucose screen negative in first trimester and on 3/2    Depression  -- Currently on no medications    Advanced maternal age in multigravida/declines genetics.  -- Declined genetic screening  -- Normal anatomy scan    Asthma  -- Mild intermittent  -- Albuterol PRN        Li Quesada MD  Obstetrics  [facility]      Patient seen and examined on admission.  25 weeks with newly elevated antiE titers. Also with acute N/V, dizziness and elevated LFTs with abdominal U/S consistent with hepatic steatosis. Unclear etiology.  Bedside U/S performed on admission did not show evidence of hydrops. Unable to clearly see cardiac views due to fetal position, but no gross pericardial effusion. No ascites or subcutaneous edema noted.  This is likely multiple different processes that are presenting simultaneously. Will order formal MFM scan to further evaluate for evidence of fetal anemia or hydrops, will treat accordingly.  As for the GI symptoms and elevated LFTs with abnormal liver enzymes, will trend labs and consult GI if a GI viral etiology does not become apparent. This may just be a viral gastroenteritis on top of chronic fatty liver as the patient had mildly elevated LFTs 1 year ago outside of  pregnancy but this was never worked up.  No evidence of preE, AFLP, HELLP at this time. Reassuring fetal status for gestational age.     Evelyn Cooper MD  Maternal Fetal Medicine fellow  PGY-5

## 2020-03-05 VITALS
OXYGEN SATURATION: 98 % | DIASTOLIC BLOOD PRESSURE: 57 MMHG | TEMPERATURE: 98 F | HEART RATE: 76 BPM | SYSTOLIC BLOOD PRESSURE: 100 MMHG | RESPIRATION RATE: 18 BRPM

## 2020-03-05 LAB
ALBUMIN SERPL BCP-MCNC: 3.2 G/DL (ref 3.5–5.2)
ALP SERPL-CCNC: 65 U/L (ref 55–135)
ALT SERPL W/O P-5'-P-CCNC: 64 U/L (ref 10–44)
ANION GAP SERPL CALC-SCNC: 12 MMOL/L (ref 8–16)
AST SERPL-CCNC: 61 U/L (ref 10–40)
BASOPHILS # BLD AUTO: 0.01 K/UL (ref 0–0.2)
BASOPHILS NFR BLD: 0.1 % (ref 0–1.9)
BILIRUB SERPL-MCNC: 0.2 MG/DL (ref 0.1–1)
BUN SERPL-MCNC: 6 MG/DL (ref 6–20)
CALCIUM SERPL-MCNC: 8.8 MG/DL (ref 8.7–10.5)
CHLORIDE SERPL-SCNC: 109 MMOL/L (ref 95–110)
CO2 SERPL-SCNC: 18 MMOL/L (ref 23–29)
CREAT SERPL-MCNC: 0.6 MG/DL (ref 0.5–1.4)
DIFFERENTIAL METHOD: ABNORMAL
EOSINOPHIL # BLD AUTO: 0 K/UL (ref 0–0.5)
EOSINOPHIL NFR BLD: 0.1 % (ref 0–8)
ERYTHROCYTE [DISTWIDTH] IN BLOOD BY AUTOMATED COUNT: 14.2 % (ref 11.5–14.5)
EST. GFR  (AFRICAN AMERICAN): >60 ML/MIN/1.73 M^2
EST. GFR  (NON AFRICAN AMERICAN): >60 ML/MIN/1.73 M^2
GLUCOSE SERPL-MCNC: 134 MG/DL (ref 70–110)
HCT VFR BLD AUTO: 36 % (ref 37–48.5)
HGB BLD-MCNC: 11.3 G/DL (ref 12–16)
IMM GRANULOCYTES # BLD AUTO: 0.16 K/UL (ref 0–0.04)
IMM GRANULOCYTES NFR BLD AUTO: 2 % (ref 0–0.5)
LYMPHOCYTES # BLD AUTO: 1.1 K/UL (ref 1–4.8)
LYMPHOCYTES NFR BLD: 13.2 % (ref 18–48)
MCH RBC QN AUTO: 28.6 PG (ref 27–31)
MCHC RBC AUTO-ENTMCNC: 31.4 G/DL (ref 32–36)
MCV RBC AUTO: 91 FL (ref 82–98)
MONOCYTES # BLD AUTO: 0.5 K/UL (ref 0.3–1)
MONOCYTES NFR BLD: 6 % (ref 4–15)
NEUTROPHILS # BLD AUTO: 6.4 K/UL (ref 1.8–7.7)
NEUTROPHILS NFR BLD: 78.6 % (ref 38–73)
NRBC BLD-RTO: 0 /100 WBC
PLATELET # BLD AUTO: 118 K/UL (ref 150–350)
PMV BLD AUTO: 13 FL (ref 9.2–12.9)
POTASSIUM SERPL-SCNC: 4.4 MMOL/L (ref 3.5–5.1)
PROT SERPL-MCNC: 6.9 G/DL (ref 6–8.4)
RBC # BLD AUTO: 3.95 M/UL (ref 4–5.4)
SODIUM SERPL-SCNC: 139 MMOL/L (ref 136–145)
WBC # BLD AUTO: 8.11 K/UL (ref 3.9–12.7)

## 2020-03-05 PROCEDURE — 85025 COMPLETE CBC W/AUTO DIFF WBC: CPT

## 2020-03-05 PROCEDURE — 80053 COMPREHEN METABOLIC PANEL: CPT

## 2020-03-05 PROCEDURE — 99238 PR HOSPITAL DISCHARGE DAY,<30 MIN: ICD-10-PCS | Mod: ,,, | Performed by: PEDIATRICS

## 2020-03-05 PROCEDURE — 99238 HOSP IP/OBS DSCHRG MGMT 30/<: CPT | Mod: ,,, | Performed by: PEDIATRICS

## 2020-03-05 PROCEDURE — G0378 HOSPITAL OBSERVATION PER HR: HCPCS

## 2020-03-05 PROCEDURE — 36415 COLL VENOUS BLD VENIPUNCTURE: CPT

## 2020-03-05 PROCEDURE — 25000003 PHARM REV CODE 250: Performed by: STUDENT IN AN ORGANIZED HEALTH CARE EDUCATION/TRAINING PROGRAM

## 2020-03-05 RX ADMIN — PRENATAL VIT W/ FE FUMARATE-FA TAB 27-0.8 MG 1 TABLET: 27-0.8 TAB at 09:03

## 2020-03-05 NOTE — PROGRESS NOTES
[facility]  Obstetrics  Antepartum Progress Note    Patient Name: Ann Godfrey  MRN: [id number]  Admission Date: 3/3/2020  Hospital Length of Stay: 2 days  Attending Physician: No att. providers found  Primary Care Provider: Radha Mckeon MD    Subjective:     Principal Problem:Maternal atypical antibody affecting pregnancy in second trimester    HPI:  Ann Godfrey is a 39 y.o. [id number] at 25w5d who presents complaining of abdominal pain, N/V, dizziness/lightheadedness, myalgias, and generalized malaise. The abdominal pain is generalized, worse in the lower quadrants. Reports two episodes of vomiting this AM; denies hematemesis. Had one large volume loose stool this AM but denies recurrent episodes of diarrhea. Was able to tolerate p.o. as of yesterday. Reports subjective F/C over the past several hours but has not taken her temperature. Also endorses blurred vision and HA over the past 2 weeks; reports HA  relieved with Tylenol. Reports mild SOB but denies any CP or new onset swelling. Also endorses frequent nose bleeds over the past several weeks. Per chart review, patient has history of significant menorrhagia outside of pregnancy.     This IUP is complicated by obesity, AMA, abnormal blood antibody titer (Anti E1:64 and Anti c no titer), asthma, and history of prior  x2.  Patient denies contractions, vaginal bleeding, LOF.  Fetal Movement: normal.    Hospital Course:  2020 - pt admitted for observation in setting of new onset abd pain, N/V with elevated LFTs 208/81 and liver US findings (suggestive steatosis or other infiltrative process). Previous +antibody c and E. Anti c ab too weak to titer. Anti E titer increased from 1:2 to 1:64  2020 - pt reports improvement in N/V overnight. Formal MFM ultrasound today. Hep panel pending. AST//78. Hep panel resulted negative. MFM scan with normal growth, normal MCA dopplers.   2020 - AM labs  pending. Pt reports significant improvement in symptoms.    Obstetric HPI:  Patient denies ctx, VB, LOF. Reports significant improvement in nausea, no vomiting yesterday. Denies CP, SOB, RUQ pain     Objective:     Vital Signs (Most Recent):  Temp: 97.9 °F (36.6 °C) (20)  Pulse: 76 (20 05)  Resp: 16 (20)  BP: (!) 103/52 (20)  SpO2: 98 % (20) Vital Signs (24h Range):  Temp:  [97 °F (36.1 °C)-98.7 °F (37.1 °C)] 97.9 °F (36.6 °C)  Pulse:  [74-89] 76  Resp:  [14-16] 16  SpO2:  [95 %-98 %] 98 %  BP: (100-116)/(51-67) 103/52        There is no height or weight on file to calculate BMI.    FHT: 130 Cat 1 (reassuring)  TOCO:  none      Intake/Output Summary (Last 24 hours) at 3/5/2020 0627  Last data filed at 3/4/2020 1611  Gross per 24 hour   Intake --   Output 1950 ml   Net -1950 ml       Cervical Exam:deferred     Significant Labs:  Recent Lab Results     None          Physical Exam:   Constitutional: She is oriented to person, place, and time. She appears well-developed and well-nourished. No distress.    HENT:   Head: Normocephalic and atraumatic.    Eyes: Conjunctivae and EOM are normal.    Neck: Normal range of motion. Neck supple. No thyromegaly present.    Cardiovascular: Normal rate and regular rhythm.     Pulmonary/Chest: Effort normal. No respiratory distress.        Abdominal: Soft. She exhibits no distension. There is no tenderness.   Gravid, NT             Musculoskeletal: Normal range of motion and moves all extremeties. She exhibits no edema or tenderness.       Neurological: She is alert and oriented to person, place, and time.    Skin: Skin is warm and dry. No rash noted.    Psychiatric: She has a normal mood and affect. Her behavior is normal.       Assessment/Plan:     39 y.o. female  at 26w0d for:    * Maternal atypical antibody affecting pregnancy in second trimester - Anti E and Anti c  -- Patient with positive antibody screen initially on  11/27   -- Anti E antibody with titer 1:2    -- Anti c antibody not titered  -- Titers were not repeated initially  -- Patient seen in clinic on 3/2, titers ordered at that time  -- Anti E titer now 1:64, anti c not titered  -- Called blood bank, little c antibody was too weak to titer  -- Patient without history of blood transfusion  -- Unsure if she had +antibodies in prior pregnancies   -- Father of this baby is the same father of her other children. Unsure if he has +antibodies.  -- Bedside ultrasound in OB ED without evidence of hydrops, formal MFM ultrasound normal  -- Consider antibody screen for FOB.    Thrombocytopenia affecting pregnancy  -- CBC in OB ED with platelets of 111>100 yesterday, AM labs pending  -- Previously platelets had been normal.  -- BP all normal   -- P/C ratio 0.10  -- Patient reporting vague symptoms over the course of the past few weeks -- headache, shortness of breath. Intermittent hypoxia overnight, will get CXR.  -- Given thrombocytopenia and elevated transaminases, preeclampsia on the differential. However normal BP make this less likely.  -- BP normal overnight, AM labs pending      Abdominal pain, nausea, vomiting  -- Patient with generalized abdominal pain, nausea, vomiting, and decreased appetite prior to admission  -- Flu swab negative  -- CMP remarkable for elevated AST/ALT: 208/81   -- On chart review, transaminases have been elevated on prior studies but never this high   -- Glucose normal at 96   -- RUQ ultrasound as above: echogenic enlarged liver   -- Hepatitis panel pending   -- Coags within normal limits   -- AST//78 yesterday, AM labs pending  -- advanced to bland diet yesterday, tolerated well, will advance to normal diet today  -- Zofran, phenergan PRN    Advanced maternal age in multigravida/declines genetics.  -- Declined genetic screening  -- Normal anatomy scan    Pregnancy, supervision, high-risk - flu  -- 26w0d today,   -- s/p BMZ course  -- Most  recent ultrasound on  revealed normal anatomy with EFW in 56% (695 g), normal growth yesterday per [facility] report, MCA dopplers normal  -- reassuring fetal monitoring, NST BID  -- History of 2 prior c-sections, plan for delivery is repeat . Fetus is transverse.  -- Holding magnesium at this time, do not believe delivery is imminent    Obesity (BMI 35.0-39.9 without comorbidity)  -- Prepregnancy BMI 38  -- TEDs, SCDs while admitted  -- OB glucose screen negative in first trimester and on 3/2    Asthma  -- Mild intermittent  -- Albuterol PRN    Depression  -- Currently on no medications      Dispo: anticipate d/c today, pending labs      Chaparrita Hoff MD  Obstetrics  [facility]

## 2020-03-05 NOTE — DISCHARGE SUMMARY
[facility]  Obstetrics  Discharge Summary      Patient Name: Ann Godfrey  MRN: [id number]  Admission Date: 3/3/2020  Hospital Length of Stay: 2 days  Discharge Date and Time:  2020 10:18 AM  Attending Physician: No att. providers found   Discharging Provider: Chaparrita Hoff MD   Primary Care Provider: Radha Mckeon MD    HPI: Ann Godfrey is a 39 y.o. [id number] at 25w5d who presents complaining of abdominal pain, N/V, dizziness/lightheadedness, myalgias, and generalized malaise. The abdominal pain is generalized, worse in the lower quadrants. Reports two episodes of vomiting this AM; denies hematemesis. Had one large volume loose stool this AM but denies recurrent episodes of diarrhea. Was able to tolerate p.o. as of yesterday. Reports subjective F/C over the past several hours but has not taken her temperature. Also endorses blurred vision and HA over the past 2 weeks; reports HA  relieved with Tylenol. Reports mild SOB but denies any CP or new onset swelling. Also endorses frequent nose bleeds over the past several weeks. Per chart review, patient has history of significant menorrhagia outside of pregnancy.     This IUP is complicated by obesity, AMA, abnormal blood antibody titer (Anti E1:64 and Anti c no titer), asthma, and history of prior  x2.  Patient denies contractions, vaginal bleeding, LOF.  Fetal Movement: normal.    FHT: 130 Cat 1 (reassuring), appropriate for GA  TOCO:  none    * No surgery found *     Hospital Course:   2020 - pt admitted for observation in setting of new onset abd pain, N/V with elevated LFTs 208/81 and liver US findings (suggestive steatosis or other infiltrative process). Previous +antibody c and E. Anti c ab too weak to titer. Anti E titer increased from 1:2 to 1:64  2020 - pt reports improvement in N/V overnight. Formal MFM ultrasound today. Hep panel pending. AST//78. Hep panel resulted negative. MFM  scan with normal growth, normal MCA dopplers.   03/05/2020 - AM labs pending. Pt reports significant improvement in symptoms.         Final Active Diagnoses:    Diagnosis Date Noted POA    PRINCIPAL PROBLEM:  Maternal atypical antibody affecting pregnancy in second trimester - Anti E and Anti c [O36.1920] 03/03/2020 Yes    Abdominal pain, nausea, vomiting [R10.9] 03/03/2020 Yes    Thrombocytopenia affecting pregnancy [O99.119, D69.6] 03/03/2020 Yes    Pregnancy, supervision, high-risk - flu [O09.90] 11/26/2019 Not Applicable    Advanced maternal age in multigravida/declines genetics. [O09.529] 11/26/2019 Yes    Obesity (BMI 35.0-39.9 without comorbidity) [E66.9] 01/28/2019 Yes    Depression [F32.9]  Yes    Asthma [J45.909]  Yes      Problems Resolved During this Admission:        Labs:   CMP   Recent Labs   Lab 03/03/20  1422 03/04/20  0534 03/05/20  0606    136 139   K 4.1 3.7 4.4    108 109   CO2 20* 19* 18*   GLU 96 159* 134*   BUN 7 4* 6   CREATININE 0.6 0.5 0.6   CALCIUM 8.8 8.4* 8.8   PROT 7.3 6.5 6.9   ALBUMIN 3.4* 3.2* 3.2*   BILITOT 0.5 0.4 0.2   ALKPHOS 61 63 65   * 113* 61*   ALT 81* 78* 64*   ANIONGAP 11 9 12   ESTGFRAFRICA >60 >60 >60   EGFRNONAA >60 >60 >60    and CBC   Recent Labs   Lab 03/03/20  1422 03/04/20  0534 03/05/20  0606   WBC 9.10 6.09 8.11   HGB 11.8* 10.6* 11.3*   HCT 35.4* 32.6* 36.0*   * 100* 118*         Immunizations     None          This patient has no babies on file.  Pending Diagnostic Studies:     None          Discharged Condition: good    Disposition: Home or Self Care    Follow Up:  Follow-up Information     [facility].    Specialty:  Obstetrics and Gynecology  Why:  follow up appointment as scheduled by clinic  Contact information:  [address] [place] [address] [phone]               Patient Instructions:      Notify your health care provider if you experience any of the following:   Order Comments:  Vaginal bleeding, leakage of fluid, contractions, decrease in fetal movement     Medications:  Current Discharge Medication List      CONTINUE these medications which have NOT CHANGED    Details   albuterol (PROAIR HFA) 90 mcg/actuation inhaler Inhale 2 puffs into the lungs every 6 (six) hours as needed for Wheezing or Shortness of Breath. Rescue  Qty: 18 g, Refills: 1    Associated Diagnoses: Acute non-recurrent sinusitis, unspecified location      prenatal vit,calc76/iron/folic (PNV 29-1 ORAL) Take by mouth.             Chaparrita Hoff MD  Obstetrics  [facility]

## 2020-03-05 NOTE — NURSING
Patient doing well. VS stable. No acute changes this shift. Patient denies LOF, vaginal bleeding, and contractions. Patient reports positive fetal movement. Call light in reach. Will continue to monitor.

## 2020-03-05 NOTE — DISCHARGE INSTRUCTIONS
Call clinic [phone] or L & D after hours at [phone] for vaginal bleeding, leakage of fluids, regular contractions every 5 mins for 2 hours, decreased fetal movements ( 10 kicks in 2 hours), headache not relieved by Tylenol, blurry vision, or temp of 100.4 or greater.  Begin doing fetal kick counts, at least 10 movements in 2 hours starting at 28 weeks gestation.  Keep next clinic appointment

## 2020-03-05 NOTE — ASSESSMENT & PLAN NOTE
-- Patient with generalized abdominal pain, nausea, vomiting, and decreased appetite prior to admission  -- Flu swab negative  -- CMP remarkable for elevated AST/ALT: 208/81   -- On chart review, transaminases have been elevated on prior studies but never this high   -- Glucose normal at 96   -- RUQ ultrasound as above: echogenic enlarged liver   -- Hepatitis panel pending   -- Coags within normal limits   -- AST//78 yesterday, AM labs pending  -- advanced to bland diet yesterday, tolerated well, will advance to normal diet today  -- Zofran, phenergan PRN

## 2020-03-05 NOTE — ASSESSMENT & PLAN NOTE
-- Patient with positive antibody screen initially on 11/27   -- Anti E antibody with titer 1:2    -- Anti c antibody not titered  -- Titers were not repeated initially  -- Patient seen in clinic on 3/2, titers ordered at that time  -- Anti E titer now 1:64, anti c not titered  -- Called blood bank, little c antibody was too weak to titer  -- Patient without history of blood transfusion  -- Unsure if she had +antibodies in prior pregnancies   -- Father of this baby is the same father of her other children. Unsure if he has +antibodies.  -- Bedside ultrasound in OB ED without evidence of hydrops, formal MFM ultrasound normal  -- Consider antibody screen for FOB.

## 2020-03-05 NOTE — SUBJECTIVE & OBJECTIVE
Obstetric HPI:  Patient denies ctx, VB, LOF. Reports significant improvement in nausea, no vomiting yesterday. Denies CP, SOB, RUQ pain     Objective:     Vital Signs (Most Recent):  Temp: 97.9 °F (36.6 °C) (03/05/20 0555)  Pulse: 76 (03/05/20 0555)  Resp: 16 (03/05/20 0555)  BP: (!) 103/52 (03/05/20 0555)  SpO2: 98 % (03/05/20 0555) Vital Signs (24h Range):  Temp:  [97 °F (36.1 °C)-98.7 °F (37.1 °C)] 97.9 °F (36.6 °C)  Pulse:  [74-89] 76  Resp:  [14-16] 16  SpO2:  [95 %-98 %] 98 %  BP: (100-116)/(51-67) 103/52        There is no height or weight on file to calculate BMI.    FHT: 130 Cat 1 (reassuring)  TOCO:  none      Intake/Output Summary (Last 24 hours) at 3/5/2020 0627  Last data filed at 3/4/2020 1611  Gross per 24 hour   Intake --   Output 1950 ml   Net -1950 ml       Cervical Exam:deferred     Significant Labs:  Recent Lab Results     None          Physical Exam:   Constitutional: She is oriented to person, place, and time. She appears well-developed and well-nourished. No distress.    HENT:   Head: Normocephalic and atraumatic.    Eyes: Conjunctivae and EOM are normal.    Neck: Normal range of motion. Neck supple. No thyromegaly present.    Cardiovascular: Normal rate and regular rhythm.     Pulmonary/Chest: Effort normal. No respiratory distress.        Abdominal: Soft. She exhibits no distension. There is no tenderness.   Gravid, NT             Musculoskeletal: Normal range of motion and moves all extremeties. She exhibits no edema or tenderness.       Neurological: She is alert and oriented to person, place, and time.    Skin: Skin is warm and dry. No rash noted.    Psychiatric: She has a normal mood and affect. Her behavior is normal.

## 2020-03-05 NOTE — PLAN OF CARE
03/05/20 1333   Discharge Assessment   Assessment Type Discharge Planning Assessment   Confirmed/corrected address and phone number on facesheet? Yes   Assessment information obtained from? Patient   Prior to hospitilization cognitive status: Alert/Oriented   Prior to hospitalization functional status: Independent   Current cognitive status: Alert/Oriented   Current Functional Status: Independent   Lives With spouse   Able to Return to Prior Arrangements yes   Do you have any problems affording any of your prescribed medications? No   Does the patient have transportation home? Yes   Transportation Anticipated family or friend will provide   Discharge Plan A Home       Introduced self to pt and explained sw role. No anticipated d/c needs at this time. Should any d/c needs arise, please consult social work. Emotional support provided.      Akanksha Hernandez LCSW    [facility]  [email or web address]    (phone) [phone] or  [place]. [phone]  (fax) [phone]

## 2020-03-05 NOTE — ASSESSMENT & PLAN NOTE
-- 26w0d today,   -- s/p BMZ course  -- Most recent ultrasound on  revealed normal anatomy with EFW in 56% (695 g), normal growth yesterday per [facility] report, MCA dopplers normal  -- reassuring fetal monitoring, NST BID  -- History of 2 prior c-sections, plan for delivery is repeat . Fetus is transverse.  -- Holding magnesium at this time, do not believe delivery is imminent

## 2020-03-06 ENCOUNTER — TELEPHONE (OUTPATIENT)
Dept: MATERNAL FETAL MEDICINE | Facility: CLINIC | Age: 40
End: 2020-03-06

## 2020-03-06 LAB — BLD GP AB SCN TITR SERPL: 8 {TITER}

## 2020-03-06 NOTE — TELEPHONE ENCOUNTER
Lab at [facility] called to report that they ran titers again and will be submitting a new report c to weak to titer and E 8 not 64   Will notify dr lei

## 2020-03-09 ENCOUNTER — TELEPHONE (OUTPATIENT)
Dept: OBSTETRICS AND GYNECOLOGY | Facility: CLINIC | Age: 40
End: 2020-03-09

## 2020-03-09 ENCOUNTER — TELEPHONE (OUTPATIENT)
Dept: MATERNAL FETAL MEDICINE | Facility: CLINIC | Age: 40
End: 2020-03-09

## 2020-03-09 NOTE — TELEPHONE ENCOUNTER
----- Message from Niru Bowden RN sent at 3/9/2020  2:33 PM CDT -----  JB     ----- Message -----  From: Alex Francis MD  Sent: 3/9/2020   1:45 PM CDT  To: Niru Bowden RN    So she is now 1:8?  If so she does not need weekly MCA she needs a repeat titer in 3 weeks  ----- Message -----  From: Niru Bowden RN  Sent: 3/9/2020   1:31 PM CDT  To: Alex Farncis MD    Can you look at this chart?    Patient scheduled for weekly scans in MFM clinic but the titers were revised.    Look at labs collected on 3/2 - Kady received a call so we wanted to let the on-call know

## 2020-03-09 NOTE — TELEPHONE ENCOUNTER
Patient was contacted and notified that titer had been recalculated and was lower than initially thought. This change was discussed with Dr. Francis (on-call [facility]) and recommendation was made to repeat titer in 3 weeks and cancel weekly ultrasound.

## 2020-03-20 ENCOUNTER — TELEPHONE (OUTPATIENT)
Dept: ADMINISTRATIVE | Facility: CLINIC | Age: 40
End: 2020-03-20

## 2020-03-22 ENCOUNTER — PATIENT MESSAGE (OUTPATIENT)
Dept: OBSTETRICS AND GYNECOLOGY | Facility: CLINIC | Age: 40
End: 2020-03-22

## 2020-03-27 ENCOUNTER — PATIENT MESSAGE (OUTPATIENT)
Dept: ADMINISTRATIVE | Facility: OTHER | Age: 40
End: 2020-03-27

## 2020-03-27 ENCOUNTER — PATIENT MESSAGE (OUTPATIENT)
Dept: OBSTETRICS AND GYNECOLOGY | Facility: CLINIC | Age: 40
End: 2020-03-27

## 2020-03-27 ENCOUNTER — OFFICE VISIT (OUTPATIENT)
Dept: OBSTETRICS AND GYNECOLOGY | Facility: CLINIC | Age: 40
End: 2020-03-27
Payer: MEDICAID

## 2020-03-27 DIAGNOSIS — O99.519 MATERNAL ASTHMA COMPLICATING PREGNANCY: ICD-10-CM

## 2020-03-27 DIAGNOSIS — J45.909 MATERNAL ASTHMA COMPLICATING PREGNANCY: ICD-10-CM

## 2020-03-27 DIAGNOSIS — O36.1931: ICD-10-CM

## 2020-03-27 DIAGNOSIS — O99.213 OBESITY IN PREGNANCY, ANTEPARTUM, THIRD TRIMESTER: ICD-10-CM

## 2020-03-27 DIAGNOSIS — R76.0 ABNORMAL ANTIBODY TITER: ICD-10-CM

## 2020-03-27 DIAGNOSIS — Z3A.29 29 WEEKS GESTATION OF PREGNANCY: ICD-10-CM

## 2020-03-27 DIAGNOSIS — O09.523 ADVANCED MATERNAL AGE IN MULTIGRAVIDA, THIRD TRIMESTER: Primary | ICD-10-CM

## 2020-03-27 PROCEDURE — 99213 OFFICE O/P EST LOW 20 MIN: CPT | Mod: TH,95,99, | Performed by: OBSTETRICS & GYNECOLOGY

## 2020-03-27 PROCEDURE — 99213 PR OFFICE/OUTPT VISIT, EST, LEVL III, 20-29 MIN: ICD-10-PCS | Mod: TH,95,99, | Performed by: OBSTETRICS & GYNECOLOGY

## 2020-03-27 NOTE — PROGRESS NOTES
The patient location is: home  The chief complaint leading to consultation is: 29 weeks pregnant  Visit type: Virtual visit with synchronous audio and video  Total time spent with patient: 20 min  Each patient to whom he or she provides medical services by telemedicine is:  (1) informed of the relationship between the physician and patient and the respective role of any other health care provider with respect to management of the patient; and (2) notified that he or she may decline to receive medical services by telemedicine and may withdraw from such care at any time.    Notes:   Complaints today: none, Good fetal movements reported.  No LOF, VB or CTXs.  No visual changes, RUQ pain, and some HA but resolved with better hydration.    LMP 2019 (Exact Date)     39 y.o., at 29w1d by Estimated Date of Delivery: 20  Patient Active Problem List   Diagnosis    Back pain, lumbosacral    Benign tumor of spinal cord    Undergoing workup for MS    GERD (gastroesophageal reflux disease)    Depression    Asthma    Urticaria    Obesity (BMI 35.0-39.9 without comorbidity)    Pre-diabetes    Early satiety    Cyst of left ovary    Pregnancy, supervision, high-risk - flu    Advanced maternal age in multigravida/declines genetics.    Maternal asthma complicating pregnancy    Obesity complicating pregnancy    Abnormal antibody titer - anti-E 1:2    Maternal atypical antibody affecting pregnancy in second trimester - Anti E and Anti c    Abdominal pain, nausea, vomiting    Thrombocytopenia affecting pregnancy     OB History    Para Term  AB Living   4 2 2 0 1 2   SAB TAB Ectopic Multiple Live Births   0 0 1 0 2      # Outcome Date GA Lbr King/2nd Weight Sex Delivery Anes PTL Lv   4 Current            3 Term 10/10/17 39w0d  4.451 kg (9 lb 13 oz) F CS-LTranv Spinal, EPI N GAUDENCIO   2 Ectopic 2015     ECTOPIC      1 Term 14 40w5d  4.326 kg (9 lb 8.6 oz) F CS-LTranv EPI N GAUDENCIO       Dating  reviewed    Allergies and problem list reviewed and updated    Medical and surgical history reviewed    Prenatal labs reviewed and updated    Physical Exam:  ABD: soft, gravid, nontender    Assessment:  39 y.o., at 29w1d by Estimated Date of Delivery: 6/11/20    Plan:   1. RTC in 2 weeks in office to receive Tdap and BTL consent  2. Labor precautions reviewed  3. Kick counts reviewed  4. Flu: received  5. Birth control plan: desires BTL. Consent needs to be signed.  Will ask if [facility] would be willing to sign BTL at her appt on 3/31. If not will need to sign at next visit.  6. Tdap at next visit  7. Antibody E titers: will place order for repeat labs.  Will plan to obtain the day of M consult to minimize exposure to the hospital.  8. Pt asked to get her scale at home working so that telemedicine visits can include weights.  BP cuff requested with connected mom.  9. Pre E precautions reviewed. BP to be taken at [facility] appt on 3/31. PT did not have cuff at home to take for today's visit.  Reviewed the BP cuff is needed for these telemedicine visits.  10. New visitor policy reviewed and COVID precautions reviewed

## 2020-03-30 ENCOUNTER — PATIENT MESSAGE (OUTPATIENT)
Dept: OBSTETRICS AND GYNECOLOGY | Facility: CLINIC | Age: 40
End: 2020-03-30

## 2020-03-31 ENCOUNTER — PROCEDURE VISIT (OUTPATIENT)
Dept: MATERNAL FETAL MEDICINE | Facility: CLINIC | Age: 40
End: 2020-03-31
Payer: MEDICAID

## 2020-03-31 ENCOUNTER — LAB VISIT (OUTPATIENT)
Dept: LAB | Facility: OTHER | Age: 40
End: 2020-03-31
Attending: OBSTETRICS & GYNECOLOGY
Payer: MEDICAID

## 2020-03-31 DIAGNOSIS — Z36.89 ENCOUNTER FOR FETAL ANATOMIC SURVEY: ICD-10-CM

## 2020-03-31 DIAGNOSIS — Z3A.29 29 WEEKS GESTATION OF PREGNANCY: ICD-10-CM

## 2020-03-31 DIAGNOSIS — O36.0930 RH ISOIMMUNIZATION, THIRD TRIMESTER, NOT APPLICABLE OR UNSPECIFIED FETUS: ICD-10-CM

## 2020-03-31 DIAGNOSIS — R76.0 ABNORMAL ANTIBODY TITER: ICD-10-CM

## 2020-03-31 LAB
ALBUMIN SERPL BCP-MCNC: 3.3 G/DL (ref 3.5–5.2)
ALP SERPL-CCNC: 66 U/L (ref 55–135)
ALT SERPL W/O P-5'-P-CCNC: 21 U/L (ref 10–44)
ANION GAP SERPL CALC-SCNC: 9 MMOL/L (ref 8–16)
AST SERPL-CCNC: 23 U/L (ref 10–40)
BASOPHILS # BLD AUTO: 0.03 K/UL (ref 0–0.2)
BASOPHILS NFR BLD: 0.3 % (ref 0–1.9)
BILIRUB SERPL-MCNC: 0.2 MG/DL (ref 0.1–1)
BLD GP AB SCN CELLS X3 SERPL QL: NORMAL
BLOOD GROUP ANTIBODIES SERPL: NORMAL
BUN SERPL-MCNC: 5 MG/DL (ref 6–20)
CALCIUM SERPL-MCNC: 9.1 MG/DL (ref 8.7–10.5)
CHLORIDE SERPL-SCNC: 108 MMOL/L (ref 95–110)
CO2 SERPL-SCNC: 22 MMOL/L (ref 23–29)
CREAT SERPL-MCNC: 0.6 MG/DL (ref 0.5–1.4)
DIFFERENTIAL METHOD: ABNORMAL
EOSINOPHIL # BLD AUTO: 0.2 K/UL (ref 0–0.5)
EOSINOPHIL NFR BLD: 1.8 % (ref 0–8)
ERYTHROCYTE [DISTWIDTH] IN BLOOD BY AUTOMATED COUNT: 13.9 % (ref 11.5–14.5)
EST. GFR  (AFRICAN AMERICAN): >60 ML/MIN/1.73 M^2
EST. GFR  (NON AFRICAN AMERICAN): >60 ML/MIN/1.73 M^2
GLUCOSE SERPL-MCNC: 89 MG/DL (ref 70–110)
HCT VFR BLD AUTO: 35.7 % (ref 37–48.5)
HGB BLD-MCNC: 11.2 G/DL (ref 12–16)
IMM GRANULOCYTES # BLD AUTO: 0.08 K/UL (ref 0–0.04)
IMM GRANULOCYTES NFR BLD AUTO: 0.9 % (ref 0–0.5)
LYMPHOCYTES # BLD AUTO: 1.4 K/UL (ref 1–4.8)
LYMPHOCYTES NFR BLD: 14.8 % (ref 18–48)
MCH RBC QN AUTO: 28.8 PG (ref 27–31)
MCHC RBC AUTO-ENTMCNC: 31.4 G/DL (ref 32–36)
MCV RBC AUTO: 92 FL (ref 82–98)
MONOCYTES # BLD AUTO: 0.4 K/UL (ref 0.3–1)
MONOCYTES NFR BLD: 4.6 % (ref 4–15)
NEUTROPHILS # BLD AUTO: 7.1 K/UL (ref 1.8–7.7)
NEUTROPHILS NFR BLD: 77.6 % (ref 38–73)
NRBC BLD-RTO: 0 /100 WBC
PLATELET # BLD AUTO: 117 K/UL (ref 150–350)
PMV BLD AUTO: 12.8 FL (ref 9.2–12.9)
POTASSIUM SERPL-SCNC: 4.4 MMOL/L (ref 3.5–5.1)
PROT SERPL-MCNC: 7 G/DL (ref 6–8.4)
RBC # BLD AUTO: 3.89 M/UL (ref 4–5.4)
SODIUM SERPL-SCNC: 139 MMOL/L (ref 136–145)
WBC # BLD AUTO: 9.13 K/UL (ref 3.9–12.7)

## 2020-03-31 PROCEDURE — 85025 COMPLETE CBC W/AUTO DIFF WBC: CPT

## 2020-03-31 PROCEDURE — 76816 OB US FOLLOW-UP PER FETUS: CPT | Mod: PBBFAC | Performed by: OBSTETRICS & GYNECOLOGY

## 2020-03-31 PROCEDURE — 76821 PR  DOPPLER FETAL MID CEREBRAL  ARTERY: ICD-10-PCS | Mod: 26,S$PBB,, | Performed by: OBSTETRICS & GYNECOLOGY

## 2020-03-31 PROCEDURE — 36415 COLL VENOUS BLD VENIPUNCTURE: CPT

## 2020-03-31 PROCEDURE — 86870 RBC ANTIBODY IDENTIFICATION: CPT

## 2020-03-31 PROCEDURE — 86886 COOMBS TEST INDIRECT TITER: CPT | Mod: 91

## 2020-03-31 PROCEDURE — 86886 COOMBS TEST INDIRECT TITER: CPT

## 2020-03-31 PROCEDURE — 80053 COMPREHEN METABOLIC PANEL: CPT

## 2020-03-31 PROCEDURE — 76816 OB US FOLLOW-UP PER FETUS: CPT | Mod: 26,S$PBB,, | Performed by: OBSTETRICS & GYNECOLOGY

## 2020-03-31 PROCEDURE — 76816 PR  US,PREGNANT UTERUS,F/U,TRANSABD APP: ICD-10-PCS | Mod: 26,S$PBB,, | Performed by: OBSTETRICS & GYNECOLOGY

## 2020-03-31 PROCEDURE — 86850 RBC ANTIBODY SCREEN: CPT

## 2020-03-31 PROCEDURE — 76821 MIDDLE CEREBRAL ARTERY ECHO: CPT | Mod: PBBFAC | Performed by: OBSTETRICS & GYNECOLOGY

## 2020-03-31 PROCEDURE — 76821 MIDDLE CEREBRAL ARTERY ECHO: CPT | Mod: 26,S$PBB,, | Performed by: OBSTETRICS & GYNECOLOGY

## 2020-04-01 ENCOUNTER — PATIENT MESSAGE (OUTPATIENT)
Dept: OBSTETRICS AND GYNECOLOGY | Facility: CLINIC | Age: 40
End: 2020-04-01

## 2020-04-01 LAB
BLD GP AB SCN TITR SERPL: 1 {TITER}
BLD GP AB SCN TITR SERPL: 16 {TITER}

## 2020-04-13 ENCOUNTER — ROUTINE PRENATAL (OUTPATIENT)
Dept: OBSTETRICS AND GYNECOLOGY | Facility: CLINIC | Age: 40
End: 2020-04-13
Payer: MEDICAID

## 2020-04-13 VITALS
WEIGHT: 257.06 LBS | SYSTOLIC BLOOD PRESSURE: 108 MMHG | DIASTOLIC BLOOD PRESSURE: 68 MMHG | BODY MASS INDEX: 41.49 KG/M2

## 2020-04-13 DIAGNOSIS — D69.6 THROMBOCYTOPENIA AFFECTING PREGNANCY: ICD-10-CM

## 2020-04-13 DIAGNOSIS — R76.0 ABNORMAL ANTIBODY TITER: Primary | ICD-10-CM

## 2020-04-13 DIAGNOSIS — Z30.09 UNWANTED FERTILITY: ICD-10-CM

## 2020-04-13 DIAGNOSIS — O99.119 THROMBOCYTOPENIA AFFECTING PREGNANCY: ICD-10-CM

## 2020-04-13 DIAGNOSIS — Z3A.31 31 WEEKS GESTATION OF PREGNANCY: ICD-10-CM

## 2020-04-13 PROCEDURE — 90471 IMMUNIZATION ADMIN: CPT | Mod: PBBFAC,PO

## 2020-04-13 PROCEDURE — 99213 OFFICE O/P EST LOW 20 MIN: CPT | Mod: TH,S$PBB,, | Performed by: OBSTETRICS & GYNECOLOGY

## 2020-04-13 PROCEDURE — 99213 OFFICE O/P EST LOW 20 MIN: CPT | Mod: PBBFAC,TH,PO,25 | Performed by: OBSTETRICS & GYNECOLOGY

## 2020-04-13 PROCEDURE — 99213 PR OFFICE/OUTPT VISIT, EST, LEVL III, 20-29 MIN: ICD-10-PCS | Mod: TH,S$PBB,, | Performed by: OBSTETRICS & GYNECOLOGY

## 2020-04-13 PROCEDURE — 99999 PR PBB SHADOW E&M-EST. PATIENT-LVL III: CPT | Mod: PBBFAC,,, | Performed by: OBSTETRICS & GYNECOLOGY

## 2020-04-13 PROCEDURE — 99999 PR PBB SHADOW E&M-EST. PATIENT-LVL III: ICD-10-PCS | Mod: PBBFAC,,, | Performed by: OBSTETRICS & GYNECOLOGY

## 2020-04-13 NOTE — PROGRESS NOTES
Complaints today:none, Good fetal movements reported.  No LOF, VB or CTXs.  She questions timing of delivery because her mom will come from [place].    /68   Wt 116.6 kg (257 lb 0.9 oz)   LMP 2019 (Exact Date)   BMI 41.49 kg/m²     39 y.o., at 31w4d by Estimated Date of Delivery: 20  Patient Active Problem List   Diagnosis    Back pain, lumbosacral    Benign tumor of spinal cord    Undergoing workup for MS    GERD (gastroesophageal reflux disease)    Depression    Asthma    Urticaria    Obesity (BMI 35.0-39.9 without comorbidity)    Pre-diabetes    Early satiety    Cyst of left ovary    Pregnancy, supervision, high-risk - flu    Advanced maternal age in multigravida/declines genetics.    Maternal asthma complicating pregnancy    Obesity complicating pregnancy    Abnormal antibody titer - anti-E 1:2    Maternal atypical antibody affecting pregnancy in second trimester - Anti E and Anti c    Abdominal pain, nausea, vomiting    Thrombocytopenia affecting pregnancy     OB History    Para Term  AB Living   4 2 2 0 1 2   SAB TAB Ectopic Multiple Live Births   0 0 1 0 2      # Outcome Date GA Lbr King/2nd Weight Sex Delivery Anes PTL Lv   4 Current            3 Term 10/10/17 39w0d  4.451 kg (9 lb 13 oz) F CS-LTranv Spinal, EPI N GAUDENCIO   2 Ectopic      ECTOPIC      1 Term 14 40w5d  4.326 kg (9 lb 8.6 oz) F CS-LTranv EPI N GAUDENCIO       Dating reviewed    Allergies and problem list reviewed and updated    Medical and surgical history reviewed    Prenatal labs reviewed and updated    Physical Exam:  ABD: soft, gravid, nontender    Assessment:  39 y.o., at 31w4d by Estimated Date of Delivery: 20    Plan:   1. RTC in 2 weeks for TM visit.  2. Labor precautions reviewed  3. Kick counts reviewed  4. Tdap/Flu: Tdap today  5. Birth control plan: signed BTL consent  6.   Antibody E titers: will place order for repeat labs.  Will plan to obtain the day of MFM MCA dopplers to  minimize exposure to the hospital.  7.  Pt not able to complete connected mom questionnaire and given a different phone number to contact.  We also left a message at that number.    8.  Pre E precautions reviewed. Reviewed the BP cuff is needed for telemedicine visits so if BP cuff not received by next appt then will need to come to clinic.  9.  New visitor policy reviewed and COVID precautions reviewed  10.  I have send Dr. Francis a message regarding her timing of delivery and frequency of titers and dopplers moving forward.  11. Hx of c/s x 2: repeat and BTL planned

## 2020-04-15 ENCOUNTER — PATIENT MESSAGE (OUTPATIENT)
Dept: OBSTETRICS AND GYNECOLOGY | Facility: CLINIC | Age: 40
End: 2020-04-15

## 2020-04-16 ENCOUNTER — PROCEDURE VISIT (OUTPATIENT)
Dept: MATERNAL FETAL MEDICINE | Facility: CLINIC | Age: 40
End: 2020-04-16
Attending: OBSTETRICS & GYNECOLOGY
Payer: MEDICAID

## 2020-04-16 DIAGNOSIS — R76.0 ABNORMAL ANTIBODY TITER: ICD-10-CM

## 2020-04-16 DIAGNOSIS — Z36.89 ENCOUNTER FOR ULTRASOUND TO CHECK FETAL GROWTH: ICD-10-CM

## 2020-04-16 DIAGNOSIS — O09.523 MULTIGRAVIDA OF ADVANCED MATERNAL AGE IN THIRD TRIMESTER: ICD-10-CM

## 2020-04-16 PROCEDURE — 76821 PR  DOPPLER FETAL MID CEREBRAL  ARTERY: ICD-10-PCS | Mod: 26,S$PBB,, | Performed by: OBSTETRICS & GYNECOLOGY

## 2020-04-16 PROCEDURE — 76821 MIDDLE CEREBRAL ARTERY ECHO: CPT | Mod: 26,S$PBB,, | Performed by: OBSTETRICS & GYNECOLOGY

## 2020-04-16 PROCEDURE — 76815 OB US LIMITED FETUS(S): CPT | Mod: PBBFAC,59 | Performed by: OBSTETRICS & GYNECOLOGY

## 2020-04-16 PROCEDURE — 76821 MIDDLE CEREBRAL ARTERY ECHO: CPT | Mod: PBBFAC | Performed by: OBSTETRICS & GYNECOLOGY

## 2020-04-16 PROCEDURE — 76815 OB US LIMITED FETUS(S): CPT | Mod: 26,S$PBB,59, | Performed by: OBSTETRICS & GYNECOLOGY

## 2020-04-16 PROCEDURE — 76815 PR  US,PREGNANT UTERUS,LIMITED, 1/> FETUSES: ICD-10-PCS | Mod: 26,S$PBB,59, | Performed by: OBSTETRICS & GYNECOLOGY

## 2020-04-17 ENCOUNTER — TELEPHONE (OUTPATIENT)
Dept: OBSTETRICS AND GYNECOLOGY | Facility: CLINIC | Age: 40
End: 2020-04-17

## 2020-04-17 NOTE — TELEPHONE ENCOUNTER
Spoke with pt by phone re breast pump- advised to call her insurance company and get information re breast pump supplier and at office visit next week we cah provide an rx for her. Pt agrees.

## 2020-04-27 ENCOUNTER — ROUTINE PRENATAL (OUTPATIENT)
Dept: OBSTETRICS AND GYNECOLOGY | Facility: CLINIC | Age: 40
End: 2020-04-27
Payer: MEDICAID

## 2020-04-27 VITALS
DIASTOLIC BLOOD PRESSURE: 74 MMHG | BODY MASS INDEX: 42.02 KG/M2 | SYSTOLIC BLOOD PRESSURE: 100 MMHG | WEIGHT: 260.38 LBS

## 2020-04-27 DIAGNOSIS — O99.119 THROMBOCYTOPENIA AFFECTING PREGNANCY: ICD-10-CM

## 2020-04-27 DIAGNOSIS — R76.0 ABNORMAL ANTIBODY TITER: ICD-10-CM

## 2020-04-27 DIAGNOSIS — Z30.09 UNWANTED FERTILITY: ICD-10-CM

## 2020-04-27 DIAGNOSIS — O99.519 MATERNAL ASTHMA COMPLICATING PREGNANCY: ICD-10-CM

## 2020-04-27 DIAGNOSIS — Z3A.33 33 WEEKS GESTATION OF PREGNANCY: Primary | ICD-10-CM

## 2020-04-27 DIAGNOSIS — O09.523 ADVANCED MATERNAL AGE IN MULTIGRAVIDA, THIRD TRIMESTER: ICD-10-CM

## 2020-04-27 DIAGNOSIS — J45.909 MATERNAL ASTHMA COMPLICATING PREGNANCY: ICD-10-CM

## 2020-04-27 DIAGNOSIS — D69.6 THROMBOCYTOPENIA AFFECTING PREGNANCY: ICD-10-CM

## 2020-04-27 DIAGNOSIS — O99.213 OBESITY IN PREGNANCY, ANTEPARTUM, THIRD TRIMESTER: ICD-10-CM

## 2020-04-27 PROCEDURE — 99213 OFFICE O/P EST LOW 20 MIN: CPT | Mod: TH,S$PBB,, | Performed by: OBSTETRICS & GYNECOLOGY

## 2020-04-27 PROCEDURE — 99999 PR PBB SHADOW E&M-EST. PATIENT-LVL II: CPT | Mod: PBBFAC,,, | Performed by: OBSTETRICS & GYNECOLOGY

## 2020-04-27 PROCEDURE — 99212 OFFICE O/P EST SF 10 MIN: CPT | Mod: PBBFAC,TH,PO | Performed by: OBSTETRICS & GYNECOLOGY

## 2020-04-27 PROCEDURE — 99213 PR OFFICE/OUTPT VISIT, EST, LEVL III, 20-29 MIN: ICD-10-PCS | Mod: TH,S$PBB,, | Performed by: OBSTETRICS & GYNECOLOGY

## 2020-04-27 PROCEDURE — 99999 PR PBB SHADOW E&M-EST. PATIENT-LVL II: ICD-10-PCS | Mod: PBBFAC,,, | Performed by: OBSTETRICS & GYNECOLOGY

## 2020-04-27 NOTE — PROGRESS NOTES
Complaints today:none, Good fetal movements reported.  No LOF, VB or CTXs.    /74   Wt 118.1 kg (260 lb 5.8 oz)   LMP 2019 (Exact Date)   BMI 42.02 kg/m²     39 y.o., at 33w4d by Estimated Date of Delivery: 20  Patient Active Problem List   Diagnosis    Back pain, lumbosacral    Benign tumor of spinal cord    Undergoing workup for MS    History of  delivery affecting pregnancy    GERD (gastroesophageal reflux disease)    Depression    Asthma    Urticaria    Obesity (BMI 35.0-39.9 without comorbidity)    Pre-diabetes    Early satiety    Cyst of left ovary    Pregnancy, supervision, high-risk - flu    Advanced maternal age in multigravida/declines genetics.    Maternal asthma complicating pregnancy    Obesity complicating pregnancy    Abnormal antibody titer - anti-E 1:2    Maternal atypical antibody affecting pregnancy in second trimester - Anti E and Anti c    Abdominal pain, nausea, vomiting    Thrombocytopenia affecting pregnancy    Unwanted fertility     OB History    Para Term  AB Living   4 2 2 0 1 2   SAB TAB Ectopic Multiple Live Births   0 0 1 0 2      # Outcome Date GA Lbr King/2nd Weight Sex Delivery Anes PTL Lv   4 Current            3 Term 10/10/17 39w0d  4.451 kg (9 lb 13 oz) F CS-LTranv Spinal, EPI N GAUDENCIO   2 Ectopic      ECTOPIC      1 Term 14 40w5d  4.326 kg (9 lb 8.6 oz) F CS-LTranv EPI N GAUDENCIO       Dating reviewed    Allergies and problem list reviewed and updated    Medical and surgical history reviewed    Prenatal labs reviewed and updated    Physical Exam:  ABD: soft, gravid, nontender    Assessment:  39 y.o., at 33w4d by Estimated Date of Delivery: 20    Plan:   1. RTC in 2 weeks and will plan for GBS at that visit since delivery is recommended between 37-38wks.  2. Labor precautions reviewed  3. Kick counts reviewed  4. Tdap/Flu: received  5. Birth control plan: BTL papers signed  6. Anti E antibodies: last blood work  was revised to 1:64 but dopplers were normal.  She has scheduled EFW and dopplers this week.  Will wait for [facility] guidance on timing of repeat antibody titers. Last recommendation was once a month but titers were normal at the time of that recommendation.

## 2020-04-30 ENCOUNTER — INITIAL CONSULT (OUTPATIENT)
Dept: MATERNAL FETAL MEDICINE | Facility: CLINIC | Age: 40
End: 2020-04-30
Payer: MEDICAID

## 2020-04-30 ENCOUNTER — PROCEDURE VISIT (OUTPATIENT)
Dept: MATERNAL FETAL MEDICINE | Facility: CLINIC | Age: 40
End: 2020-04-30
Attending: OBSTETRICS & GYNECOLOGY
Payer: MEDICAID

## 2020-04-30 VITALS
DIASTOLIC BLOOD PRESSURE: 70 MMHG | SYSTOLIC BLOOD PRESSURE: 110 MMHG | BODY MASS INDEX: 42.59 KG/M2 | WEIGHT: 263.88 LBS

## 2020-04-30 DIAGNOSIS — O36.0930 RHESUS ISOIMMUNIZATION DURING PREGNANCY IN THIRD TRIMESTER, SINGLE OR UNSPECIFIED FETUS: Primary | ICD-10-CM

## 2020-04-30 DIAGNOSIS — Z36.89 ENCOUNTER FOR ULTRASOUND TO CHECK FETAL GROWTH: ICD-10-CM

## 2020-04-30 DIAGNOSIS — O09.523 MULTIGRAVIDA OF ADVANCED MATERNAL AGE IN THIRD TRIMESTER: ICD-10-CM

## 2020-04-30 DIAGNOSIS — O36.0930 RHESUS ISOIMMUNIZATION DURING PREGNANCY IN THIRD TRIMESTER, SINGLE OR UNSPECIFIED FETUS: ICD-10-CM

## 2020-04-30 PROCEDURE — 76819 FETAL BIOPHYS PROFIL W/O NST: CPT | Mod: 26,S$PBB,, | Performed by: OBSTETRICS & GYNECOLOGY

## 2020-04-30 PROCEDURE — 76816 OB US FOLLOW-UP PER FETUS: CPT | Mod: 26,S$PBB,, | Performed by: OBSTETRICS & GYNECOLOGY

## 2020-04-30 PROCEDURE — 99213 OFFICE O/P EST LOW 20 MIN: CPT | Mod: PBBFAC,TH | Performed by: OBSTETRICS & GYNECOLOGY

## 2020-04-30 PROCEDURE — 76821 MIDDLE CEREBRAL ARTERY ECHO: CPT | Mod: PBBFAC | Performed by: OBSTETRICS & GYNECOLOGY

## 2020-04-30 PROCEDURE — 99999 PR PBB SHADOW E&M-EST. PATIENT-LVL III: ICD-10-PCS | Mod: PBBFAC,,, | Performed by: OBSTETRICS & GYNECOLOGY

## 2020-04-30 PROCEDURE — 76821 MIDDLE CEREBRAL ARTERY ECHO: CPT | Mod: 26,S$PBB,, | Performed by: OBSTETRICS & GYNECOLOGY

## 2020-04-30 PROCEDURE — 76821 PR  DOPPLER FETAL MID CEREBRAL  ARTERY: ICD-10-PCS | Mod: 26,S$PBB,, | Performed by: OBSTETRICS & GYNECOLOGY

## 2020-04-30 PROCEDURE — 76816 OB US FOLLOW-UP PER FETUS: CPT | Mod: PBBFAC | Performed by: OBSTETRICS & GYNECOLOGY

## 2020-04-30 PROCEDURE — 99213 PR OFFICE/OUTPT VISIT, EST, LEVL III, 20-29 MIN: ICD-10-PCS | Mod: S$PBB,TH,, | Performed by: OBSTETRICS & GYNECOLOGY

## 2020-04-30 PROCEDURE — 76819 PR US, OB, FETAL BIOPHYSICAL, W/O NST: ICD-10-PCS | Mod: 26,S$PBB,, | Performed by: OBSTETRICS & GYNECOLOGY

## 2020-04-30 PROCEDURE — 99999 PR PBB SHADOW E&M-EST. PATIENT-LVL III: CPT | Mod: PBBFAC,,, | Performed by: OBSTETRICS & GYNECOLOGY

## 2020-04-30 PROCEDURE — 76819 FETAL BIOPHYS PROFIL W/O NST: CPT | Mod: PBBFAC | Performed by: OBSTETRICS & GYNECOLOGY

## 2020-04-30 PROCEDURE — 76816 PR  US,PREGNANT UTERUS,F/U,TRANSABD APP: ICD-10-PCS | Mod: 26,S$PBB,, | Performed by: OBSTETRICS & GYNECOLOGY

## 2020-04-30 PROCEDURE — 99213 OFFICE O/P EST LOW 20 MIN: CPT | Mod: S$PBB,TH,, | Performed by: OBSTETRICS & GYNECOLOGY

## 2020-04-30 NOTE — PROGRESS NOTES
Chief complaint: f/u abnormal antibody screen    Provider requesting consultation: Dr. Weber/Dr. Lama    39 y.o. [id number] 34w0d EGA PObHx:  OB History    Para Term  AB Living   4 2 2 0 1 2   SAB TAB Ectopic Multiple Live Births   0 0 1 0 2      # Outcome Date GA Lbr King/2nd Weight Sex Delivery Anes PTL Lv   4 Current            3 Term 10/10/17 39w0d  4.451 kg (9 lb 13 oz) F CS-LTranv Spinal, EPI N GAUDENCIO   2 Ectopic 2015     ECTOPIC      1 Term 14 40w5d  4.326 kg (9 lb 8.6 oz) F CS-LTranv EPI N GAUDENCIO       PSH:  Past Surgical History:   Procedure Laterality Date    APPENDECTOMY       SECTION      x2    ECTOPIC PREGNANCY SURGERY      posteriorlaminectomy      For benign spinal tumor;            Objective:    Physical Exam        See ultrasound report (read by Dr. Cornelius) under imaging tab. MCA Doppler PSV normal today.    Recommendations:     Alloimmunization  Titer results reviewed in Epic. Titer has been inconsistently reported and value has been edited. Unclear if this represents worsening sensitization or lab abnormalities reflecting presence of 2 antibodies.  Regardless, recommend weekly MCA Doppler and BPP until delivery. Recommend delivery at 37 - 37 6/7 weeks gestation; unless indicated earlier.  MCA Dopplers may not be able to be obtained beyond 35+ weeks.   Has had previous BMZ course; do not recommend rescue course if late  delivery is indicated.  OB should crossmatch for 2 units or prbc at time of surgery due to positive antibody screen and two prior CD with risk of bleeding.  Patient desires BTL at time of c/s. Counseled on risks of higher order C/S (> 3) as well as potential for worsening alloimmunization/sensitization with subsequent pregnancies. Father of baby has not been phenotyped/genotyped.    The approximate physician face to face time was 15 minutes. The majority of time (greater than 50%) was spent on counseling of the patient or coordination of  care.

## 2020-05-07 ENCOUNTER — PROCEDURE VISIT (OUTPATIENT)
Dept: MATERNAL FETAL MEDICINE | Facility: CLINIC | Age: 40
End: 2020-05-07
Payer: MEDICAID

## 2020-05-07 DIAGNOSIS — Z36.89 ENCOUNTER FOR FETAL ANATOMIC SURVEY: ICD-10-CM

## 2020-05-07 DIAGNOSIS — R76.0 ABNORMAL ANTIBODY TITER: ICD-10-CM

## 2020-05-07 DIAGNOSIS — O36.1130 ISOIMMUNIZATION FROM BLOOD GROUP INCOMPATIBILITY DURING PREGNANCY IN THIRD TRIMESTER, SINGLE OR UNSPECIFIED FETUS: ICD-10-CM

## 2020-05-07 PROCEDURE — 76819 FETAL BIOPHYS PROFIL W/O NST: CPT | Mod: PBBFAC | Performed by: OBSTETRICS & GYNECOLOGY

## 2020-05-07 PROCEDURE — 76819 PR US, OB, FETAL BIOPHYSICAL, W/O NST: ICD-10-PCS | Mod: 26,S$PBB,, | Performed by: OBSTETRICS & GYNECOLOGY

## 2020-05-07 PROCEDURE — 76819 FETAL BIOPHYS PROFIL W/O NST: CPT | Mod: 26,S$PBB,, | Performed by: OBSTETRICS & GYNECOLOGY

## 2020-05-07 PROCEDURE — 76821 MIDDLE CEREBRAL ARTERY ECHO: CPT | Mod: 26,S$PBB,, | Performed by: OBSTETRICS & GYNECOLOGY

## 2020-05-07 PROCEDURE — 76821 MIDDLE CEREBRAL ARTERY ECHO: CPT | Mod: PBBFAC | Performed by: OBSTETRICS & GYNECOLOGY

## 2020-05-07 PROCEDURE — 76821 PR  DOPPLER FETAL MID CEREBRAL  ARTERY: ICD-10-PCS | Mod: 26,S$PBB,, | Performed by: OBSTETRICS & GYNECOLOGY

## 2020-05-13 ENCOUNTER — ROUTINE PRENATAL (OUTPATIENT)
Dept: OBSTETRICS AND GYNECOLOGY | Facility: CLINIC | Age: 40
End: 2020-05-13
Payer: MEDICAID

## 2020-05-13 ENCOUNTER — PATIENT MESSAGE (OUTPATIENT)
Dept: OBSTETRICS AND GYNECOLOGY | Facility: CLINIC | Age: 40
End: 2020-05-13

## 2020-05-13 VITALS
WEIGHT: 269.81 LBS | DIASTOLIC BLOOD PRESSURE: 72 MMHG | BODY MASS INDEX: 43.55 KG/M2 | SYSTOLIC BLOOD PRESSURE: 128 MMHG

## 2020-05-13 DIAGNOSIS — J45.909 MATERNAL ASTHMA COMPLICATING PREGNANCY: ICD-10-CM

## 2020-05-13 DIAGNOSIS — O99.213 OBESITY AFFECTING PREGNANCY IN THIRD TRIMESTER: ICD-10-CM

## 2020-05-13 DIAGNOSIS — O09.523 MULTIGRAVIDA OF ADVANCED MATERNAL AGE IN THIRD TRIMESTER: ICD-10-CM

## 2020-05-13 DIAGNOSIS — O99.519 MATERNAL ASTHMA COMPLICATING PREGNANCY: ICD-10-CM

## 2020-05-13 DIAGNOSIS — R76.0 ABNORMAL ANTIBODY TITER: ICD-10-CM

## 2020-05-13 DIAGNOSIS — O09.93 SUPERVISION OF HIGH RISK PREGNANCY IN THIRD TRIMESTER: Primary | ICD-10-CM

## 2020-05-13 PROCEDURE — 99999 PR PBB SHADOW E&M-EST. PATIENT-LVL III: ICD-10-PCS | Mod: PBBFAC,,, | Performed by: OBSTETRICS & GYNECOLOGY

## 2020-05-13 PROCEDURE — 99213 PR OFFICE/OUTPT VISIT, EST, LEVL III, 20-29 MIN: ICD-10-PCS | Mod: TH,S$PBB,, | Performed by: OBSTETRICS & GYNECOLOGY

## 2020-05-13 PROCEDURE — 99213 OFFICE O/P EST LOW 20 MIN: CPT | Mod: TH,S$PBB,, | Performed by: OBSTETRICS & GYNECOLOGY

## 2020-05-13 PROCEDURE — 87081 CULTURE SCREEN ONLY: CPT

## 2020-05-13 PROCEDURE — 99213 OFFICE O/P EST LOW 20 MIN: CPT | Mod: PBBFAC,TH,PO | Performed by: OBSTETRICS & GYNECOLOGY

## 2020-05-13 PROCEDURE — 99999 PR PBB SHADOW E&M-EST. PATIENT-LVL III: CPT | Mod: PBBFAC,,, | Performed by: OBSTETRICS & GYNECOLOGY

## 2020-05-13 NOTE — PROGRESS NOTES
Complaints today: sacrum pain, but she had it in her prior pregnancy. Not taking anything for it and feels manageable.     Denies contractions, vaginal bleeding, LOF. Normal fetal movement.    /72   Wt 122.4 kg (269 lb 13.5 oz)   LMP 2019 (Exact Date)   BMI 43.55 kg/m²     39 y.o., at 35w6d by Estimated Date of Delivery: 20  Patient Active Problem List   Diagnosis    Back pain, lumbosacral    Benign tumor of spinal cord    Undergoing workup for MS    History of  delivery affecting pregnancy    GERD (gastroesophageal reflux disease)    Depression    Asthma    Urticaria    Obesity (BMI 35.0-39.9 without comorbidity)    Pre-diabetes    Early satiety    Cyst of left ovary    Pregnancy, supervision, high-risk - flu    Advanced maternal age in multigravida/declines genetics.    Maternal asthma complicating pregnancy    Obesity complicating pregnancy    Abnormal antibody titer - anti-E 1:2    Maternal atypical antibody affecting pregnancy in second trimester - Anti E and Anti c    Abdominal pain, nausea, vomiting    Thrombocytopenia affecting pregnancy    Unwanted fertility     OB History    Para Term  AB Living   4 2 2 0 1 2   SAB TAB Ectopic Multiple Live Births   0 0 1 0 2      # Outcome Date GA Lbr King/2nd Weight Sex Delivery Anes PTL Lv   4 Current            3 Term 10/10/17 39w0d  4.451 kg (9 lb 13 oz) F CS-LTranv Spinal, EPI N GAUDENCIO   2 Ectopic 2015     ECTOPIC      1 Term 14 40w5d  4.326 kg (9 lb 8.6 oz) F CS-LTranv EPI N GAUDENCIO       Dating reviewed    Allergies and problem list reviewed and updated    Medical and surgical history reviewed    Prenatal labs reviewed and updated    Physical Exam:  ABD: soft, gravid, nontender    Assessment:  Ann was seen today for routine prenatal visit.    Diagnoses and all orders for this visit:    Supervision of high risk pregnancy in third trimester  -     Case Request Operating Room:  SECTION, WITH TUBAL  LIGATION    Multigravida of advanced maternal age in third trimester    Maternal asthma complicating pregnancy    Obesity affecting pregnancy in third trimester    Abnormal antibody titer - anti-E 1:2   -1:64 as of last titer   -[facility] recommend delivery at 37 weeks gestation    Plan:   - follow up 1 Weeks, kick counts, labor precautions  - and BTL scheduled for May 21 at 10 am

## 2020-05-13 NOTE — PROGRESS NOTES
Seen and examined.  Agree with note.  All questions answered  Ann was seen today for routine prenatal visit.    Diagnoses and all orders for this visit:    Supervision of high risk pregnancy in third trimester  -     Case Request Operating Room:  SECTION, WITH TUBAL LIGATION    Multigravida of advanced maternal age in third trimester    Maternal asthma complicating pregnancy    Obesity affecting pregnancy in third trimester    Abnormal antibody titer - anti-E 1:2     continue weekly dopplers

## 2020-05-14 ENCOUNTER — PROCEDURE VISIT (OUTPATIENT)
Dept: MATERNAL FETAL MEDICINE | Facility: CLINIC | Age: 40
End: 2020-05-14
Payer: MEDICAID

## 2020-05-14 DIAGNOSIS — R76.0 ABNORMAL ANTIBODY TITER: ICD-10-CM

## 2020-05-14 DIAGNOSIS — Z36.89 ENCOUNTER FOR FETAL ANATOMIC SURVEY: ICD-10-CM

## 2020-05-14 DIAGNOSIS — O09.523 MULTIGRAVIDA OF ADVANCED MATERNAL AGE IN THIRD TRIMESTER: ICD-10-CM

## 2020-05-14 DIAGNOSIS — O36.1930 MATERNAL ATYPICAL ANTIBODY AFFECTING PREGNANCY IN THIRD TRIMESTER, SINGLE OR UNSPECIFIED FETUS: ICD-10-CM

## 2020-05-14 PROCEDURE — 76819 FETAL BIOPHYS PROFIL W/O NST: CPT | Mod: PBBFAC | Performed by: OBSTETRICS & GYNECOLOGY

## 2020-05-14 PROCEDURE — 76819 PR US, OB, FETAL BIOPHYSICAL, W/O NST: ICD-10-PCS | Mod: 26,S$PBB,, | Performed by: OBSTETRICS & GYNECOLOGY

## 2020-05-14 PROCEDURE — 76819 FETAL BIOPHYS PROFIL W/O NST: CPT | Mod: 26,S$PBB,, | Performed by: OBSTETRICS & GYNECOLOGY

## 2020-05-14 PROCEDURE — 76821 MIDDLE CEREBRAL ARTERY ECHO: CPT | Mod: 26,S$PBB,, | Performed by: OBSTETRICS & GYNECOLOGY

## 2020-05-14 PROCEDURE — 76821 MIDDLE CEREBRAL ARTERY ECHO: CPT | Mod: PBBFAC | Performed by: OBSTETRICS & GYNECOLOGY

## 2020-05-14 PROCEDURE — 76821 PR  DOPPLER FETAL MID CEREBRAL  ARTERY: ICD-10-PCS | Mod: 26,S$PBB,, | Performed by: OBSTETRICS & GYNECOLOGY

## 2020-05-16 LAB — BACTERIA SPEC AEROBE CULT: NORMAL

## 2020-05-18 ENCOUNTER — HOSPITAL ENCOUNTER (OUTPATIENT)
Dept: PERINATAL CARE | Facility: OTHER | Age: 40
Discharge: HOME | End: 2020-05-18
Attending: OBSTETRICS & GYNECOLOGY
Payer: MEDICAID

## 2020-05-18 DIAGNOSIS — Z36.89 ENCOUNTER FOR FETAL ANATOMIC SURVEY: ICD-10-CM

## 2020-05-18 DIAGNOSIS — R76.0 ABNORMAL ANTIBODY TITER: ICD-10-CM

## 2020-05-18 PROCEDURE — 76821 MIDDLE CEREBRAL ARTERY ECHO: CPT | Mod: 26,,, | Performed by: OBSTETRICS & GYNECOLOGY

## 2020-05-18 PROCEDURE — 76819 FETAL BIOPHYS PROFIL W/O NST: CPT | Mod: 26,,, | Performed by: OBSTETRICS & GYNECOLOGY

## 2020-05-18 PROCEDURE — 76819 FETAL BIOPHYS PROFIL W/O NST: CPT

## 2020-05-18 PROCEDURE — 76819 PR US, OB, FETAL BIOPHYSICAL, W/O NST: ICD-10-PCS | Mod: 26,,, | Performed by: OBSTETRICS & GYNECOLOGY

## 2020-05-18 PROCEDURE — 76821 PR  DOPPLER FETAL MID CEREBRAL  ARTERY: ICD-10-PCS | Mod: 26,,, | Performed by: OBSTETRICS & GYNECOLOGY

## 2020-05-18 PROCEDURE — 76820 UMBILICAL ARTERY ECHO: CPT

## 2020-05-19 ENCOUNTER — ROUTINE PRENATAL (OUTPATIENT)
Dept: OBSTETRICS AND GYNECOLOGY | Facility: CLINIC | Age: 40
End: 2020-05-19
Payer: MEDICAID

## 2020-05-19 VITALS
WEIGHT: 273.56 LBS | BODY MASS INDEX: 44.16 KG/M2 | SYSTOLIC BLOOD PRESSURE: 120 MMHG | DIASTOLIC BLOOD PRESSURE: 70 MMHG

## 2020-05-19 DIAGNOSIS — Z3A.36 36 WEEKS GESTATION OF PREGNANCY: Primary | ICD-10-CM

## 2020-05-19 PROCEDURE — 99213 OFFICE O/P EST LOW 20 MIN: CPT | Mod: TH,S$PBB,, | Performed by: ADVANCED PRACTICE MIDWIFE

## 2020-05-19 PROCEDURE — 99213 PR OFFICE/OUTPT VISIT, EST, LEVL III, 20-29 MIN: ICD-10-PCS | Mod: TH,S$PBB,, | Performed by: ADVANCED PRACTICE MIDWIFE

## 2020-05-19 PROCEDURE — 99999 PR PBB SHADOW E&M-EST. PATIENT-LVL III: ICD-10-PCS | Mod: PBBFAC,,, | Performed by: ADVANCED PRACTICE MIDWIFE

## 2020-05-19 PROCEDURE — 99213 OFFICE O/P EST LOW 20 MIN: CPT | Mod: PBBFAC,PO | Performed by: ADVANCED PRACTICE MIDWIFE

## 2020-05-19 PROCEDURE — 99999 PR PBB SHADOW E&M-EST. PATIENT-LVL III: CPT | Mod: PBBFAC,,, | Performed by: ADVANCED PRACTICE MIDWIFE

## 2020-05-19 NOTE — PROGRESS NOTES
Chief Complaint   Patient presents with    Routine Prenatal Visit       39 y.o., at 36w5d by Estimated Date of Delivery: 20    Complaints today:None    ROS  OBSTETRICS:   Contractions denies   Bleeding denies   Loss of fluid denies   Fetal mvmnt Reports good Fm, reinforced BID  GASTRO:   Nausea none   Vomiting None    I have seen the patient, reviewed the Resident's assessment, plan and progress note. I have personally interviewed and examined the patient at bedside and: agree with the findings.               OB History    Para Term  AB Living   4 2 2 0 1 2   SAB TAB Ectopic Multiple Live Births   0 0 1 0 2      # Outcome Date GA Lbr King/2nd Weight Sex Delivery Anes PTL Lv   4 Current            3 Term 10/10/17 39w0d  4.451 kg (9 lb 13 oz) F CS-LTranv Spinal, EPI N GAUDENCIO   2 Ectopic 2015     ECTOPIC      1 Term 14 40w5d  4.326 kg (9 lb 8.6 oz) F CS-LTranv EPI N GAUDENCIO       Dating reviewed  Allergies and problem list reviewed and updated  Medical and surgical history reviewed  Prenatal labs reviewed and updated    PHYSICAL EXAM  /70   Wt 124.1 kg (273 lb 9.5 oz)   LMP 2019 (Exact Date)   BMI 44.16 kg/m²     GENERAL: No acute distress  HEENT: Normocephalic  NEURO: Alert and oriented x3  PSYCH: Normal mood and affect  PULMONARY: Non-labored respiration; no tachypnea  ABD: Soft, gravid, nontender; no hernia or hepatosplenomegaly    ASSESSMENT AND PLAN     3 Problems (from 19 to present)     Problem Noted Resolved    Pregnancy, supervision, high-risk - flu 2019 by Yamileth Weber MD No    Advanced maternal age in multigravida/declines genetics. 2019 by Yamileth Weber MD No    Maternal asthma complicating pregnancy 2019 by Yamileth Weber MD No    Obesity complicating pregnancy 2019 by Yamileth Weber MD No    Abnormal antibody titer - anti-E 1:2 2019 by Yamileth Weber MD No          Letter provided stating that pt desires to start her maternity  leave.   Discussed procedure for obtaining breast pump- pt will notify us of company name so can fax rx.    labor precautions given  Follow-up: 1 weeks

## 2020-05-19 NOTE — PROGRESS NOTES
Routine OB    Complaints today: none . Endorses fetal movement. Denies any leakage of fluid or vaginal bleeding. Scheduled CS on Thursday.    /70   Wt 124.1 kg (273 lb 9.5 oz)   LMP 2019 (Exact Date)   BMI 44.16 kg/m²     39 y.o., at 36w5d by Estimated Date of Delivery: 20  Patient Active Problem List   Diagnosis    Back pain, lumbosacral    Benign tumor of spinal cord    Undergoing workup for MS    History of  delivery affecting pregnancy    GERD (gastroesophageal reflux disease)    Depression    Asthma    Urticaria    Obesity (BMI 35.0-39.9 without comorbidity)    Pre-diabetes    Early satiety    Cyst of left ovary    Pregnancy, supervision, high-risk - flu    Advanced maternal age in multigravida/declines genetics.    Maternal asthma complicating pregnancy    Obesity complicating pregnancy    Abnormal antibody titer - anti-E 1:2    Maternal atypical antibody affecting pregnancy in second trimester - Anti E and Anti c    Abdominal pain, nausea, vomiting    Thrombocytopenia affecting pregnancy    Unwanted fertility     OB History    Para Term  AB Living   4 2 2 0 1 2   SAB TAB Ectopic Multiple Live Births   0 0 1 0 2      # Outcome Date GA Lbr King/2nd Weight Sex Delivery Anes PTL Lv   4 Current            3 Term 10/10/17 39w0d  4.451 kg (9 lb 13 oz) F CS-LTranv Spinal, EPI N GAUDENCIO   2 Ectopic      ECTOPIC      1 Term 14 40w5d  4.326 kg (9 lb 8.6 oz) F CS-LTranv EPI N GAUDENCIO       Dating reviewed    Allergies and problem list reviewed and updated    Medical and surgical history reviewed    Prenatal labs reviewed and updated    Physical Exam:  HEENT: normocephalic, atraumatic  Cards: regular rate  Pulm: normal effort  Abd: soft, gravid, non-tender  Extrm: no edema    Assessment:  Ann was seen today for routine prenatal visit.    Diagnoses and all orders for this visit:    36 weeks gestation of pregnancy    Encounter for care and examination of  lactating mother      Plan:   -CS on thursday  -pre labor precautions given    NICHO Russell MD  OBGYN PGY1

## 2020-05-20 ENCOUNTER — TELEPHONE (OUTPATIENT)
Dept: OBSTETRICS AND GYNECOLOGY | Facility: CLINIC | Age: 40
End: 2020-05-20

## 2020-05-20 DIAGNOSIS — R76.0 ABNORMAL ANTIBODY TITER: Primary | ICD-10-CM

## 2020-05-20 NOTE — TELEPHONE ENCOUNTER
Note unusual antibody screen.  Will coordinate type and cross for 2 units of PRBC today so they are available tomorrow.  Will call pt back with lab appointment information.

## 2020-05-20 NOTE — TELEPHONE ENCOUNTER
Patient was notified and scheduled for repeat type and screen for today stat.    I spoke with Mela with [facility] blood bank and she will contact [facility] blood bank about having blood available for patient if needed following her .

## 2020-05-21 ENCOUNTER — ANESTHESIA EVENT (OUTPATIENT)
Dept: OBSTETRICS AND GYNECOLOGY | Facility: OTHER | Age: 40
End: 2020-05-21
Payer: MEDICAID

## 2020-05-21 ENCOUNTER — ANESTHESIA (OUTPATIENT)
Dept: OBSTETRICS AND GYNECOLOGY | Facility: OTHER | Age: 40
End: 2020-05-21
Payer: MEDICAID

## 2020-05-21 ENCOUNTER — HOSPITAL ENCOUNTER (INPATIENT)
Facility: OTHER | Age: 40
LOS: 4 days | Discharge: HOME | End: 2020-05-25
Attending: OBSTETRICS & GYNECOLOGY | Admitting: OBSTETRICS & GYNECOLOGY
Payer: MEDICAID

## 2020-05-21 DIAGNOSIS — Z98.891 S/P CESAREAN SECTION: Primary | ICD-10-CM

## 2020-05-21 DIAGNOSIS — Z98.891 HISTORY OF CESAREAN DELIVERY: ICD-10-CM

## 2020-05-21 LAB
BASOPHILS # BLD AUTO: 0.04 K/UL (ref 0–0.2)
BASOPHILS NFR BLD: 0.4 % (ref 0–1.9)
DIFFERENTIAL METHOD: ABNORMAL
EOSINOPHIL # BLD AUTO: 0.1 K/UL (ref 0–0.5)
EOSINOPHIL NFR BLD: 1.3 % (ref 0–8)
ERYTHROCYTE [DISTWIDTH] IN BLOOD BY AUTOMATED COUNT: 13.9 % (ref 11.5–14.5)
HCT VFR BLD AUTO: 36.2 % (ref 37–48.5)
HGB BLD-MCNC: 11.6 G/DL (ref 12–16)
IMM GRANULOCYTES # BLD AUTO: 0.14 K/UL (ref 0–0.04)
IMM GRANULOCYTES NFR BLD AUTO: 1.4 % (ref 0–0.5)
LYMPHOCYTES # BLD AUTO: 1.3 K/UL (ref 1–4.8)
LYMPHOCYTES NFR BLD: 12.3 % (ref 18–48)
MCH RBC QN AUTO: 29 PG (ref 27–31)
MCHC RBC AUTO-ENTMCNC: 32 G/DL (ref 32–36)
MCV RBC AUTO: 91 FL (ref 82–98)
MONOCYTES # BLD AUTO: 0.6 K/UL (ref 0.3–1)
MONOCYTES NFR BLD: 5.8 % (ref 4–15)
NEUTROPHILS # BLD AUTO: 8.2 K/UL (ref 1.8–7.7)
NEUTROPHILS NFR BLD: 78.8 % (ref 38–73)
NRBC BLD-RTO: 0 /100 WBC
PLATELET # BLD AUTO: 110 K/UL (ref 150–350)
PMV BLD AUTO: 12.7 FL (ref 9.2–12.9)
RBC # BLD AUTO: 4 M/UL (ref 4–5.4)
SARS-COV-2 IGG SERPLBLD QL IA.RAPID: NEGATIVE
SARS-COV-2 RDRP RESP QL NAA+PROBE: NEGATIVE
WBC # BLD AUTO: 10.37 K/UL (ref 3.9–12.7)

## 2020-05-21 PROCEDURE — 25000003 PHARM REV CODE 250: Performed by: STUDENT IN AN ORGANIZED HEALTH CARE EDUCATION/TRAINING PROGRAM

## 2020-05-21 PROCEDURE — S0028 INJECTION, FAMOTIDINE, 20 MG: HCPCS | Performed by: STUDENT IN AN ORGANIZED HEALTH CARE EDUCATION/TRAINING PROGRAM

## 2020-05-21 PROCEDURE — 36004724 HC OB OR TIME LEV III - 1ST 15 MIN: Mod: SZN

## 2020-05-21 PROCEDURE — 63600175 PHARM REV CODE 636 W HCPCS: Performed by: STUDENT IN AN ORGANIZED HEALTH CARE EDUCATION/TRAINING PROGRAM

## 2020-05-21 PROCEDURE — 59514 CESAREAN DELIVERY ONLY: CPT | Mod: AT,,, | Performed by: OBSTETRICS & GYNECOLOGY

## 2020-05-21 PROCEDURE — 27200033

## 2020-05-21 PROCEDURE — 88302 TISSUE EXAM BY PATHOLOGIST: CPT | Mod: 26,,, | Performed by: PATHOLOGY

## 2020-05-21 PROCEDURE — 71000039 HC RECOVERY, EACH ADD'L HOUR: Performed by: OBSTETRICS & GYNECOLOGY

## 2020-05-21 PROCEDURE — U0002 COVID-19 LAB TEST NON-CDC: HCPCS

## 2020-05-21 PROCEDURE — 88302 PR  SURG PATH,LEVEL II: ICD-10-PCS | Mod: 26,,, | Performed by: PATHOLOGY

## 2020-05-21 PROCEDURE — 85025 COMPLETE CBC W/AUTO DIFF WBC: CPT

## 2020-05-21 PROCEDURE — 36004725 HC OB OR TIME LEV III - EA ADD 15 MIN: Performed by: OBSTETRICS & GYNECOLOGY

## 2020-05-21 PROCEDURE — 58611 LIGATE OVIDUCT(S) ADD-ON: CPT | Mod: ,,, | Performed by: OBSTETRICS & GYNECOLOGY

## 2020-05-21 PROCEDURE — 11000001 HC ACUTE MED/SURG PRIVATE ROOM

## 2020-05-21 PROCEDURE — 37000009 HC ANESTHESIA EA ADD 15 MINS: Performed by: OBSTETRICS & GYNECOLOGY

## 2020-05-21 PROCEDURE — S0020 INJECTION, BUPIVICAINE HYDRO: HCPCS | Performed by: STUDENT IN AN ORGANIZED HEALTH CARE EDUCATION/TRAINING PROGRAM

## 2020-05-21 PROCEDURE — 36004724 HC OB OR TIME LEV III - 1ST 15 MIN: Performed by: OBSTETRICS & GYNECOLOGY

## 2020-05-21 PROCEDURE — 71000033 HC RECOVERY, INTIAL HOUR: Performed by: OBSTETRICS & GYNECOLOGY

## 2020-05-21 PROCEDURE — 86922 COMPATIBILITY TEST ANTIGLOB: CPT

## 2020-05-21 PROCEDURE — 58611 PR LIGATION,FALLOPIAN TUBE W/C-SECTION: ICD-10-PCS | Mod: ,,, | Performed by: OBSTETRICS & GYNECOLOGY

## 2020-05-21 PROCEDURE — 59514 PRA REAN DELIVERY ONLY: ICD-10-PCS | Mod: ,,, | Performed by: ANESTHESIOLOGY

## 2020-05-21 PROCEDURE — 59514 CESAREAN DELIVERY ONLY: CPT | Mod: ,,, | Performed by: ANESTHESIOLOGY

## 2020-05-21 PROCEDURE — 59514 PR CESAREAN DELIVERY ONLY: ICD-10-PCS | Mod: AT,,, | Performed by: OBSTETRICS & GYNECOLOGY

## 2020-05-21 PROCEDURE — 27200691 HC TRAY, EPIDURAL-SINGLE SHOT: Performed by: ANESTHESIOLOGY

## 2020-05-21 PROCEDURE — 51702 INSERT TEMP BLADDER CATH: CPT

## 2020-05-21 PROCEDURE — 37000009 HC ANESTHESIA EA ADD 15 MINS: Mod: SZN

## 2020-05-21 PROCEDURE — 86769 SARS-COV-2 COVID-19 ANTIBODY: CPT

## 2020-05-21 PROCEDURE — 88302 TISSUE EXAM BY PATHOLOGIST: CPT | Mod: SZN | Performed by: PATHOLOGY

## 2020-05-21 PROCEDURE — 37000008 HC ANESTHESIA 1ST 15 MINUTES: Performed by: OBSTETRICS & GYNECOLOGY

## 2020-05-21 RX ORDER — SODIUM CITRATE AND CITRIC ACID MONOHYDRATE 334; 500 MG/5ML; MG/5ML
30 SOLUTION ORAL
Status: COMPLETED | OUTPATIENT
Start: 2020-05-21 | End: 2020-05-21

## 2020-05-21 RX ORDER — CARBOPROST TROMETHAMINE 250 UG/ML
250 INJECTION, SOLUTION INTRAMUSCULAR
Status: DISCONTINUED | OUTPATIENT
Start: 2020-05-21 | End: 2020-05-25 | Stop reason: HOSPADM

## 2020-05-21 RX ORDER — MORPHINE SULFATE 0.5 MG/ML
INJECTION, SOLUTION EPIDURAL; INTRATHECAL; INTRAVENOUS
Status: DISCONTINUED | OUTPATIENT
Start: 2020-05-21 | End: 2020-05-21

## 2020-05-21 RX ORDER — PHENYLEPHRINE HYDROCHLORIDE 10 MG/ML
INJECTION INTRAVENOUS
Status: DISCONTINUED | OUTPATIENT
Start: 2020-05-21 | End: 2020-05-21

## 2020-05-21 RX ORDER — MISOPROSTOL 200 UG/1
800 TABLET ORAL ONCE AS NEEDED
Status: DISCONTINUED | OUTPATIENT
Start: 2020-05-21 | End: 2020-05-25 | Stop reason: HOSPADM

## 2020-05-21 RX ORDER — SODIUM CHLORIDE, SODIUM LACTATE, POTASSIUM CHLORIDE, CALCIUM CHLORIDE 600; 310; 30; 20 MG/100ML; MG/100ML; MG/100ML; MG/100ML
INJECTION, SOLUTION INTRAVENOUS CONTINUOUS PRN
Status: DISCONTINUED | OUTPATIENT
Start: 2020-05-21 | End: 2020-05-21

## 2020-05-21 RX ORDER — ACETAMINOPHEN 325 MG/1
650 TABLET ORAL EVERY 6 HOURS
Status: CANCELLED | OUTPATIENT
Start: 2020-05-21 | End: 2020-05-22

## 2020-05-21 RX ORDER — ONDANSETRON 8 MG/1
8 TABLET, ORALLY DISINTEGRATING ORAL EVERY 8 HOURS PRN
Status: DISCONTINUED | OUTPATIENT
Start: 2020-05-21 | End: 2020-05-25 | Stop reason: HOSPADM

## 2020-05-21 RX ORDER — OXYTOCIN/RINGER'S LACTATE 30/500 ML
95 PLASTIC BAG, INJECTION (ML) INTRAVENOUS ONCE
Status: DISCONTINUED | OUTPATIENT
Start: 2020-05-21 | End: 2020-05-25 | Stop reason: HOSPADM

## 2020-05-21 RX ORDER — OXYCODONE HYDROCHLORIDE 5 MG/1
10 TABLET ORAL EVERY 4 HOURS PRN
Status: CANCELLED | OUTPATIENT
Start: 2020-05-21 | End: 2020-05-22

## 2020-05-21 RX ORDER — DIPHENHYDRAMINE HCL 25 MG
25 CAPSULE ORAL EVERY 6 HOURS PRN
Status: DISCONTINUED | OUTPATIENT
Start: 2020-05-21 | End: 2020-05-25 | Stop reason: HOSPADM

## 2020-05-21 RX ORDER — ACETAMINOPHEN 325 MG/1
650 TABLET ORAL
Status: DISCONTINUED | OUTPATIENT
Start: 2020-05-21 | End: 2020-05-25 | Stop reason: HOSPADM

## 2020-05-21 RX ORDER — OXYTOCIN/RINGER'S LACTATE 30/500 ML
334 PLASTIC BAG, INJECTION (ML) INTRAVENOUS ONCE
Status: DISCONTINUED | OUTPATIENT
Start: 2020-05-21 | End: 2020-05-25 | Stop reason: HOSPADM

## 2020-05-21 RX ORDER — OXYCODONE HYDROCHLORIDE 5 MG/1
5 TABLET ORAL EVERY 4 HOURS PRN
Status: CANCELLED | OUTPATIENT
Start: 2020-05-21 | End: 2020-05-22

## 2020-05-21 RX ORDER — OXYTOCIN 10 [USP'U]/ML
INJECTION, SOLUTION INTRAMUSCULAR; INTRAVENOUS
Status: DISCONTINUED | OUTPATIENT
Start: 2020-05-21 | End: 2020-05-21

## 2020-05-21 RX ORDER — HYDROCORTISONE 25 MG/G
CREAM TOPICAL 3 TIMES DAILY PRN
Status: DISCONTINUED | OUTPATIENT
Start: 2020-05-21 | End: 2020-05-25 | Stop reason: HOSPADM

## 2020-05-21 RX ORDER — METHYLERGONOVINE MALEATE 0.2 MG/ML
200 INJECTION INTRAVENOUS ONCE AS NEEDED
Status: DISCONTINUED | OUTPATIENT
Start: 2020-05-21 | End: 2020-05-25 | Stop reason: HOSPADM

## 2020-05-21 RX ORDER — FENTANYL CITRATE 50 UG/ML
INJECTION, SOLUTION INTRAMUSCULAR; INTRAVENOUS
Status: DISCONTINUED | OUTPATIENT
Start: 2020-05-21 | End: 2020-05-21

## 2020-05-21 RX ORDER — DIPHENHYDRAMINE HYDROCHLORIDE 50 MG/ML
12.5 INJECTION INTRAMUSCULAR; INTRAVENOUS NIGHTLY PRN
Status: CANCELLED | OUTPATIENT
Start: 2020-05-21

## 2020-05-21 RX ORDER — ACETAMINOPHEN 500 MG
1000 TABLET ORAL
Status: COMPLETED | OUTPATIENT
Start: 2020-05-21 | End: 2020-05-21

## 2020-05-21 RX ORDER — SODIUM CHLORIDE, SODIUM LACTATE, POTASSIUM CHLORIDE, CALCIUM CHLORIDE 600; 310; 30; 20 MG/100ML; MG/100ML; MG/100ML; MG/100ML
INJECTION, SOLUTION INTRAVENOUS CONTINUOUS
Status: DISCONTINUED | OUTPATIENT
Start: 2020-05-21 | End: 2020-05-25 | Stop reason: HOSPADM

## 2020-05-21 RX ORDER — BUPIVACAINE HYDROCHLORIDE 7.5 MG/ML
INJECTION, SOLUTION EPIDURAL; RETROBULBAR
Status: DISCONTINUED | OUTPATIENT
Start: 2020-05-21 | End: 2020-05-21

## 2020-05-21 RX ORDER — KETOROLAC TROMETHAMINE 30 MG/ML
30 INJECTION, SOLUTION INTRAMUSCULAR; INTRAVENOUS EVERY 6 HOURS
Status: DISCONTINUED | OUTPATIENT
Start: 2020-05-21 | End: 2020-05-21

## 2020-05-21 RX ORDER — KETOROLAC TROMETHAMINE 30 MG/ML
30 INJECTION, SOLUTION INTRAMUSCULAR; INTRAVENOUS
Status: COMPLETED | OUTPATIENT
Start: 2020-05-21 | End: 2020-05-22

## 2020-05-21 RX ORDER — FAMOTIDINE 10 MG/ML
20 INJECTION INTRAVENOUS
Status: COMPLETED | OUTPATIENT
Start: 2020-05-21 | End: 2020-05-21

## 2020-05-21 RX ORDER — IBUPROFEN 400 MG/1
800 TABLET ORAL
Status: DISCONTINUED | OUTPATIENT
Start: 2020-05-22 | End: 2020-05-25 | Stop reason: HOSPADM

## 2020-05-21 RX ORDER — DOCUSATE SODIUM 100 MG/1
200 CAPSULE, LIQUID FILLED ORAL 2 TIMES DAILY
Status: DISCONTINUED | OUTPATIENT
Start: 2020-05-21 | End: 2020-05-25 | Stop reason: HOSPADM

## 2020-05-21 RX ORDER — MIDAZOLAM HYDROCHLORIDE 1 MG/ML
INJECTION INTRAMUSCULAR; INTRAVENOUS
Status: DISCONTINUED | OUTPATIENT
Start: 2020-05-21 | End: 2020-05-21

## 2020-05-21 RX ORDER — OXYCODONE HYDROCHLORIDE 5 MG/1
10 TABLET ORAL EVERY 4 HOURS PRN
Status: DISCONTINUED | OUTPATIENT
Start: 2020-05-21 | End: 2020-05-25 | Stop reason: HOSPADM

## 2020-05-21 RX ORDER — ONDANSETRON 2 MG/ML
4 INJECTION INTRAMUSCULAR; INTRAVENOUS EVERY 6 HOURS PRN
Status: DISCONTINUED | OUTPATIENT
Start: 2020-05-21 | End: 2020-05-25 | Stop reason: HOSPADM

## 2020-05-21 RX ORDER — OXYCODONE HYDROCHLORIDE 5 MG/1
5 TABLET ORAL EVERY 4 HOURS PRN
Status: DISCONTINUED | OUTPATIENT
Start: 2020-05-21 | End: 2020-05-25 | Stop reason: HOSPADM

## 2020-05-21 RX ORDER — PRENATAL WITH FERROUS FUM AND FOLIC ACID 3080; 920; 120; 400; 22; 1.84; 3; 20; 10; 1; 12; 200; 27; 25; 2 [IU]/1; [IU]/1; MG/1; [IU]/1; MG/1; MG/1; MG/1; MG/1; MG/1; MG/1; UG/1; MG/1; MG/1; MG/1; MG/1
1 TABLET ORAL DAILY
Status: DISCONTINUED | OUTPATIENT
Start: 2020-05-21 | End: 2020-05-25 | Stop reason: HOSPADM

## 2020-05-21 RX ORDER — DIPHENHYDRAMINE HYDROCHLORIDE 50 MG/ML
12.5 INJECTION INTRAMUSCULAR; INTRAVENOUS
Status: DISCONTINUED | OUTPATIENT
Start: 2020-05-21 | End: 2020-05-25 | Stop reason: HOSPADM

## 2020-05-21 RX ORDER — AMOXICILLIN 250 MG
1 CAPSULE ORAL NIGHTLY PRN
Status: DISCONTINUED | OUTPATIENT
Start: 2020-05-21 | End: 2020-05-25 | Stop reason: HOSPADM

## 2020-05-21 RX ORDER — SODIUM CHLORIDE 0.9 % (FLUSH) 0.9 %
10 SYRINGE (ML) INJECTION
Status: DISCONTINUED | OUTPATIENT
Start: 2020-05-21 | End: 2020-05-25 | Stop reason: HOSPADM

## 2020-05-21 RX ORDER — NALBUPHINE HYDROCHLORIDE 10 MG/ML
5 INJECTION, SOLUTION INTRAMUSCULAR; INTRAVENOUS; SUBCUTANEOUS ONCE AS NEEDED
Status: DISCONTINUED | OUTPATIENT
Start: 2020-05-21 | End: 2020-05-25 | Stop reason: HOSPADM

## 2020-05-21 RX ORDER — SIMETHICONE 80 MG
1 TABLET,CHEWABLE ORAL EVERY 6 HOURS PRN
Status: DISCONTINUED | OUTPATIENT
Start: 2020-05-21 | End: 2020-05-25 | Stop reason: HOSPADM

## 2020-05-21 RX ORDER — DEXAMETHASONE SODIUM PHOSPHATE 4 MG/ML
INJECTION, SOLUTION INTRA-ARTICULAR; INTRALESIONAL; INTRAMUSCULAR; INTRAVENOUS; SOFT TISSUE
Status: DISCONTINUED | OUTPATIENT
Start: 2020-05-21 | End: 2020-05-21

## 2020-05-21 RX ORDER — ONDANSETRON HYDROCHLORIDE 2 MG/ML
INJECTION, SOLUTION INTRAMUSCULAR; INTRAVENOUS
Status: DISCONTINUED | OUTPATIENT
Start: 2020-05-21 | End: 2020-05-21

## 2020-05-21 RX ORDER — ONDANSETRON 2 MG/ML
4 INJECTION INTRAMUSCULAR; INTRAVENOUS EVERY 6 HOURS PRN
Status: CANCELLED | OUTPATIENT
Start: 2020-05-21 | End: 2020-05-22

## 2020-05-21 RX ADMIN — SODIUM CHLORIDE, SODIUM LACTATE, POTASSIUM CHLORIDE, AND CALCIUM CHLORIDE: 600; 310; 30; 20 INJECTION, SOLUTION INTRAVENOUS at 09:05

## 2020-05-21 RX ADMIN — ACETAMINOPHEN 1000 MG: 500 TABLET ORAL at 09:05

## 2020-05-21 RX ADMIN — KETOROLAC TROMETHAMINE 30 MG: 30 INJECTION, SOLUTION INTRAMUSCULAR at 10:05

## 2020-05-21 RX ADMIN — KETOROLAC TROMETHAMINE 30 MG: 30 INJECTION, SOLUTION INTRAMUSCULAR at 11:05

## 2020-05-21 RX ADMIN — DEXAMETHASONE SODIUM PHOSPHATE 4 MG: 4 INJECTION, SOLUTION INTRAMUSCULAR; INTRAVENOUS at 09:05

## 2020-05-21 RX ADMIN — ONDANSETRON 4 MG: 2 INJECTION, SOLUTION INTRAMUSCULAR; INTRAVENOUS at 09:05

## 2020-05-21 RX ADMIN — PHENYLEPHRINE HYDROCHLORIDE 50 MCG/MIN: 10 INJECTION INTRAVENOUS at 09:05

## 2020-05-21 RX ADMIN — SODIUM CHLORIDE, SODIUM LACTATE, POTASSIUM CHLORIDE, AND CALCIUM CHLORIDE: .6; .31; .03; .02 INJECTION, SOLUTION INTRAVENOUS at 09:05

## 2020-05-21 RX ADMIN — DOCUSATE SODIUM 200 MG: 100 CAPSULE, LIQUID FILLED ORAL at 09:05

## 2020-05-21 RX ADMIN — PHENYLEPHRINE HYDROCHLORIDE 100 MCG: 10 INJECTION INTRAVENOUS at 10:05

## 2020-05-21 RX ADMIN — FENTANYL CITRATE 10 MCG: 50 INJECTION, SOLUTION INTRAMUSCULAR; INTRAVENOUS at 09:05

## 2020-05-21 RX ADMIN — ACETAMINOPHEN 650 MG: 325 TABLET ORAL at 03:05

## 2020-05-21 RX ADMIN — SODIUM CITRATE AND CITRIC ACID MONOHYDRATE 30 ML: 500; 334 SOLUTION ORAL at 09:05

## 2020-05-21 RX ADMIN — KETOROLAC TROMETHAMINE 30 MG: 30 INJECTION, SOLUTION INTRAMUSCULAR at 05:05

## 2020-05-21 RX ADMIN — SODIUM CHLORIDE, SODIUM LACTATE, POTASSIUM CHLORIDE, AND CALCIUM CHLORIDE: 600; 310; 30; 20 INJECTION, SOLUTION INTRAVENOUS at 10:05

## 2020-05-21 RX ADMIN — OXYCODONE HYDROCHLORIDE 10 MG: 5 TABLET ORAL at 11:05

## 2020-05-21 RX ADMIN — BUPIVACAINE HYDROCHLORIDE 3 ML: 7.5 INJECTION, SOLUTION EPIDURAL; RETROBULBAR at 09:05

## 2020-05-21 RX ADMIN — FAMOTIDINE 20 MG: 10 INJECTION INTRAVENOUS at 09:05

## 2020-05-21 RX ADMIN — PHENYLEPHRINE HYDROCHLORIDE 200 MCG: 10 INJECTION INTRAVENOUS at 10:05

## 2020-05-21 RX ADMIN — OXYCODONE HYDROCHLORIDE 10 MG: 5 TABLET ORAL at 05:05

## 2020-05-21 RX ADMIN — MIDAZOLAM HYDROCHLORIDE 1 MG: 1 INJECTION, SOLUTION INTRAMUSCULAR; INTRAVENOUS at 10:05

## 2020-05-21 RX ADMIN — DEXTROSE 3 G: 50 INJECTION, SOLUTION INTRAVENOUS at 09:05

## 2020-05-21 RX ADMIN — Medication 0.1 MG: at 09:05

## 2020-05-21 RX ADMIN — OXYTOCIN 6 UNITS: 10 INJECTION, SOLUTION INTRAMUSCULAR; INTRAVENOUS at 10:05

## 2020-05-21 RX ADMIN — OXYCODONE HYDROCHLORIDE 10 MG: 5 TABLET ORAL at 12:05

## 2020-05-21 RX ADMIN — ACETAMINOPHEN 650 MG: 325 TABLET ORAL at 09:05

## 2020-05-21 NOTE — L&D DELIVERY NOTE
[facility]   Section   Operative Note    SUMMARY      Section Procedure Note    Procedure:   1. Repeat  Section via Pfannenstiel skin incision    Indications:   1. previous uterine incision low transverse   2. Abnormal antibody screen: Anti E, Anti-c, Anti-S      Pre-operative Diagnosis:   1. IUP at 37 week 0 day pregnancy  2. AMA  3. Abnormal antibody screen   4. Undesired fertility   5. H/o salpingectomy (left)    Post-operative Diagnosis:   same    Surgeon: Loyd Piedra MD     Assistants: Ana Menon MD-PGY2    Anesthesia: Epidural anesthesia    Findings:    1. Single viable  female infant, with APGARS 9/9, weight 3550g.   2. Normal appearing uterus  3. Normal placenta  4. Absent left fallopian tube     Estimated Blood Loss:  835 mL           Total IV Fluids: 500 mL     UOP: 200 mL    Specimens: right fallopian tube     PreOp CBC:   Lab Results   Component Value Date    WBC 10.37 2020    HGB 11.6 (L) 2020    HCT 36.2 (L) 2020    MCV 91 2020     (L) 2020                     Complications:  None; patient tolerated the procedure well.           Disposition: PACU - hemodynamically stable.           Condition: stable    Procedure Details   The patient was seen in the Holding Room. The risks, benefits, complications, treatment options, and expected outcomes were discussed with the patient.  The patient concurred with the proposed plan, giving informed consent.  The patient was taken to Operating Room, identified as Ann Godfrey and the procedure verified as repeat  Delivery and right tubal ligation. A Time Out was held and the above information confirmed.    After induction of anesthesia, the patient was prepped and draped in the usual sterile manner while placed in a dorsal supine position with a left lateral tilt.  A conner catheter was also placed per nursing. Preoperative antibiotics Ancef 3 g was  administered. An allis test was performed confirming adequate anesthesia.  A Pfannenstiel incision was made and carried down through the subcutaneous tissue to the fascia. Fascial incision was made and extended transversely. The fascia was grasped with [facility] clamps and  from the underlying rectus tissue superiorly and inferiorly. The peritoneum was identified, found to be free of adherent bowel, and entered sharply. Peritoneal incision was extended longitudinally. The vesico-uterine peritoneum was identified, and bladder blade was inserted. The vesico-uterine peritoneum was incised transversely and the bladder flap was bluntly freed from the lower uterine segment. The bladder blade was reinserted to keep the bladder out of the operative field. A low transverse uterine incision was made with knife and extended with gentle cephalocaudal traction. The amniotic sac was ruptured with hemostat and the infant was noted to be in vertex presentation. The head was brought to the incision and elevated out of the pelvis. The patient delivered a single viable female infant without difficulty.  Infant weighed 3550 grams with Apgar scores of 9/9 at one and five minutes respectively. After the umbilical cord was clamped and cut, cord blood was obtained for evaluation. The placenta was removed intact, appeared normal, and was discarded. The uterus was exteriorized. The uterine incision was closed with running locked sutures of 1 chromic. One additional figure of eight suture was required in the central portion of the hysterotomy. Hemostasis was observed.     Attention was then turned to the tubes. The left fallopian tube was noted to be surgically absent. The right tube was noted to be adhered to the ovary at the fimbria. [place] clamp was used to elevated the right tube. Small window was made with electrocautery in an avascular portion of the mesosalpinx. Tube was doubly ligated and removed. It was then sent for  pathology. Small area of bleeding in the mesosalpinx was noted and single stitch with 3-0 chromic was placed. Hemostasis was then noted.     The uterus was returned to the abdominal cavity. Incision was reinspected and good hemostasis was noted. The abdominal cavity was irrigated to remove clots. The fascia was then reapproximated with running sutures of 1 PDS. The subcutaneous fat was reapproximated with vicryl, and skin was reapproximated with 4-0 monocryl.    Instrument, sponge, and needle counts were correct prior the abdominal closure and at the conclusion of the case.     Pt tolerated procedure well and was in stable condition after the procedure.      **NOTE: This patient is NOT a candidate for trial of labor after  delivery**    Ana Menon MD  OBGYN, PGY-2               Delivery Information for Donavan Godfrey    Birth information:  YOB: 2018   Time of birth: 10:23 AM   Sex: female   Head Delivery Date/Time: 2020 10:23 AM   Delivery type: , Low Transverse   Gestational Age: 37w0d    Delivery Providers    Delivering clinician:  Loyd Piedra MD   Provider Role    Ana Menon MD Resident    Nettie Rivers MD Resident    Trudi BALLARD [place] Surgical Tech    Karina Rachel, JENNY Registered Nurse    Karina Nolasco RN Registered Nurse            Measurements    Weight:  3550 g  Length:  50.2 cm  Head circumference:  37.5 cm  Chest circumference:  34.9 cm         Apgars    Living status:  Living  Apgars:   1 min.:   5 min.:   10 min.:   15 min.:   20 min.:     Skin color:   1  1       Heart rate:   2  2       Reflex irritability:   2  2       Muscle tone:   2  2       Respiratory effort:   2  2       Total:   9  9       Apgars assigned by:  JENNY NOLASCO         Operative Delivery    Forceps attempted?:  No  Vacuum extractor attempted?:  No         Shoulder Dystocia    Shoulder dystocia present?:  No           Presentation    Presentation:  Vertex            Interventions/Resuscitation    Method:  Bulb Suctioning, Tactile Stimulation       Cord    Vessels:  3 vessels  Complications:  None  Delayed Cord Clamping?:  No  Cord Clamped Date/Time:  2020 10:24 AM  Cord Blood Disposition:  Sent with Baby  Gases Sent?:  No  Stem Cell Collection (by MD):  No       Placenta    Placenta delivery date/time:  2020 1024  Placenta removal:  Manual removal  Placenta appearance:  Intact  Placenta disposition:  discarded           Labor Events:       labor: No     Labor Onset Date/Time:         Dilation Complete Date/Time:         Start Pushing Date/Time:         Start Pushing Date/Time:       Rupture Date/Time:              Rupture type:           Fluid Amount:        Fluid Color:        Fluid Odor:        Membrane Status:                 steroids: None     Antibiotics given for GBS: No     Induction: none     Indications for induction:        Augmentation:       Indications for augmentation:       Labor complications: None     Additional complications:          Cervical ripening:                     Delivery:      Episiotomy: None     Indication for Episiotomy:       Perineal Lacerations: None Repaired:      Periurethral Laceration:   Repaired:     Labial Laceration:   Repaired:     Sulcus Laceration:   Repaired:     Vaginal Laceration:   Repaired:     Cervical Laceration:   Repaired:     Repair suture: None     Repair # of packets:       Last Value - EBL - Nursing (mL):       Sum - EBL - Nursing (mL): 0     Last Value - EBL - Anesthesia (mL):      Calculated QBL (mL): 395      Vaginal Sweep Performed: No     Surgicount Correct: Yes       Other providers:       Anesthesia    Method:  Spinal          Details (if applicable):  Trial of Labor No    Categorization: Repeat    Priority: Routine   Indications for : Other (Add Comments)   Incision Type: low transverse     Additional  information:  Forceps:    Vacuum:     Breech:    Observed anomalies    Other (Comments):

## 2020-05-21 NOTE — H&P
HISTORY AND PHYSICAL                                                OBSTETRICS          Subjective:       Ann Godfrey is a 39 y.o.  female with IUP at 37w0d weeks gestation who presents with for scheduled RLTCS/BTL.    Patient denies contractions, denies vaginal bleeding, denies LOF.   Fetal Movement: normal.    This IUP is complicated by h/o of LTCS x 2, Anti E antibody (most recent titer on  1:64), AMA, Asthma (on proair) .    Review of Systems   Constitutional: Negative for chills and fever.   Eyes: Negative for visual disturbance.   Respiratory: Negative for shortness of breath.    Cardiovascular: Negative for chest pain and palpitations.   Gastrointestinal: Negative for abdominal pain, constipation, diarrhea, nausea and vomiting.   Genitourinary: Negative for vaginal bleeding and vaginal discharge.   Musculoskeletal: Negative for back pain.   Integumentary:  Negative for rash.   Neurological: Negative for seizures, syncope and headaches.   Hematological: Does not bruise/bleed easily.   Psychiatric/Behavioral: Negative for depression. The patient is not nervous/anxious.      Patient denies  fever, denies cough denies SOB, denies chest pain,  denies diarrhea/abdominal pain , denies anosmia denies positive COVID test in the past 7 days.       PMHx:   Past Medical History:   Diagnosis Date    Arthritis     spinal    Asthma     was on advair , resolved    Benign tumor of spinal cord     Depression     Ectopic pregnancy     GERD (gastroesophageal reflux disease)     Pre-diabetes     Urticaria        PSHx:   Past Surgical History:   Procedure Laterality Date    APPENDECTOMY       SECTION      x2    ECTOPIC PREGNANCY SURGERY      posteriorlaminectomy      For benign spinal tumor;        All: Review of patient's allergies indicates:  No Known Allergies    Meds:   Medications Prior to Admission   Medication Sig Dispense Refill Last Dose    albuterol (PROAIR HFA) 90  mcg/actuation inhaler Inhale 2 puffs into the lungs every 6 (six) hours as needed for Wheezing or Shortness of Breath. Rescue 18 g 1 Taking    prenatal vit,calc76/iron/folic (PNV 29-1 ORAL) Take by mouth.   Taking       SH:   Social History     Socioeconomic History    Marital status:      Spouse name: Not on file    Number of children: Not on file    Years of education: Not on file    Highest education level: Not on file   Occupational History    Not on file   Social Needs    Financial resource strain: Somewhat hard    Food insecurity:     Worry: Never true     Inability: Never true    Transportation needs:     Medical: No     Non-medical: No   Tobacco Use    Smoking status: Former Smoker     Packs/day: 1.00     Years: 13.00     Pack years: 13.00     Types: Cigarettes     Last attempt to quit: 10/20/2012     Years since quittin.5    Smokeless tobacco: Former User   Substance and Sexual Activity    Alcohol use: No     Frequency: 2-4 times a month     Drinks per session: 1 or 2     Binge frequency: Never    Drug use: No    Sexual activity: Yes     Partners: Male     Birth control/protection: None   Lifestyle    Physical activity:     Days per week: 2 days     Minutes per session: Not on file    Stress: To some extent   Relationships    Social connections:     Talks on phone: More than three times a week     Gets together: Once a week     Attends [employer] service: Not on file     Active member of club or organization: Yes     Attends meetings of clubs or organizations: More than 4 times per year     Relationship status:    Other Topics Concern    Not on file   Social History Narrative    Not on file       FH:   Family History   Problem Relation Age of Onset    Hypertension Mother     Hepatitis Father         cleared    Breast cancer Neg Hx     Colon cancer Neg Hx     Ovarian cancer Neg Hx     Stroke Neg Hx     Diabetes Neg Hx     Heart disease Neg Hx        OBHx:   OB  "History    Para Term  AB Living   4 2 2 0 1 2   SAB TAB Ectopic Multiple Live Births   0 0 1 0 2      # Outcome Date GA Lbr King/2nd Weight Sex Delivery Anes PTL Lv   4 Current            3 Term 10/10/17 39w0d  4.451 kg (9 lb 13 oz) F CS-LTranv Spinal, EPI N GAUDENCIO      Name: RUBIN RIVERA      Apgar1: 6  Apgar5: 9   2 Ectopic      ECTOPIC      1 Term 14 40w5d  4.326 kg (9 lb 8.6 oz) F CS-LTranv EPI N GAUDENCIO      Apgar1: 9  Apgar5: 9       Objective:       Ht 5' 6" (1.676 m)   Wt 123.8 kg (273 lb)   LMP 2019 (Exact Date)   Breastfeeding? No   BMI 44.06 kg/m²     Vitals:    20 0700   Weight: 123.8 kg (273 lb)   Height: 5' 6" (1.676 m)       General:   alert, appears stated age and cooperative, no apparent distress   HENT:  normocephalic, atraumatic   Eyes:  extraocular movements and conjunctivae normal   Neck:  supple, range of motion normal, no thyromegaly   Lungs:   no respiratory distress   Heart:   regular rate   Abdomen:  soft, non-tender, non-distended but gravid, no rebound or guarding    Extremities negative edema, negative erythema   FHT: 150s, moderate BTBV, +accels, -decels;  Cat 1 (reassuring)                 TOCO: Not tabby    Presentations: cephalic by ultrasound   Cervix:     Deferred                             EFW by Leopold's: 3000g    Recent Growth Scan: EFW 2766g 56% on 2020    Lab Review  Blood Type O POS  GBBS: negative  Rubella: Immune  RPR: NR  HIV: negative  HepB: negative       Assessment:       37w0d weeks gestation with Scheduled RLTCS    Active Hospital Problems    Diagnosis  POA    History of  delivery [Z98.891]  Not Applicable      Resolved Hospital Problems   No resolved problems to display.          Plan:     RLTCS   Risks, benefits, alternatives and possible complications have been discussed in detail with the patient.   - Consents signed and to chart  - Admit to Labor and Delivery unit  - Ancef OCTOR   - Epidural per " Anesthesia  - Draw CBC, T&S  - Notify Staff  - EFW 3000g  - Post-Partum Hemorrhage risk - medium  - SCDs for DVT ppx    Anti E Ab:  - most recent titer 1/64  - MCA dopplers per MFM have been normal  - Antibody test performed yesterday  - 2 units being T&C    Asthma  - continue home proair  - will avoid hemabate     Undesired Fertility:  - BTL paper signed  - BTL at time of C/S    Nettie Rivers M.D.  Obstetrics and Gynecology   PGY-3

## 2020-05-21 NOTE — LACTATION NOTE
Basic lactation education reviewed using breastfeeding guide, all questions answered. Educated mom on hand expression and encouraged to HE after nursing due to LGA infant, v/u. LC left phone number on mother's white board for mother to call for asst as needed.Told mother what time LC leaves the floor. Mother also told that LC can see when she calls [employer] phone and if LC does not answer, she is busy but will come as soon as possible.

## 2020-05-21 NOTE — DISCHARGE INSTRUCTIONS
Breastfeeding Discharge Instructions       Feed the baby at the earliest sign of hunger or comfort  o Hands to mouth, sucking motions  o Rooting or searching for something to suck on  o Dont wait for crying - it is a sign of distress     The feedings may be 8-12 times per 24hrs and will not follow a schedule   Avoid pacifiers and bottles for the first 4 weeks   Alternate the breast you start the feeding with, or start with the breast that feels the fullest   Switch breasts when the baby takes himself off the breast or falls asleep   Keep offering breasts until the baby looks full, no longer gives hunger signs, and stays asleep when placed on his back in the crib   If the baby is sleepy and wont wake for a feeding, put the baby skin-to-skin dressed in a diaper against the mothers bare chest   Sleep near your baby   The baby should be positioned and latched on to the breast correctly  o Chest-to-chest, chin in the breast  o Babys lips are flipped outward  o Babys mouth is stretched open wide like a shout  o Babys sucking should feel like tugging to the mother  - The baby should be drinking at the breast:  o You should hear swallowing or gulping throughout the feeding  o You should see milk on the babys lips when he comes off the breast  o Your breasts should be softer when the baby is finished feeding  o The baby should look relaxed at the end of feedings  o After the 4th day and your milk is in:  o The babys poop should turn bright yellow and be loose, watery, and seedy  o The baby should have at least 3-4 poops the size of the palm of your hand per day  o The baby should have at least 5-6 wet diapers per day  o The urine should be light yellow in color  You should drink when you are thirsty and eat a healthy diet when you are    hungry.     Take naps to get the rest you need.   Take medications and/or drink alcohol only with permission of your obstetrician    or the babys pediatrician.  You can  also call the Infant Risk Center,   ([phone]), Monday-Friday, 8am-5pm Central time, to get the most   up-to-date evidence-based information on the use of medications during   pregnancy and breastfeeding.      The baby should be examined by a pediatrician at 3-5 days of age.  Once your   milk comes in, the baby should be gaining at least ½ - 1oz each day and should be back to birthweight no later than 10-14 days of age.          Community Resources    [facility] Breastfeeding Warmline: [phone]   Local [facility] clinics: provide incentives and breastpumps to eligible mothers  [employer] Leche [employer] (LLLI):  mother-to-mother support group website        www.Capptain[employer].US Emergency Registry  Local La Leche League mother-to-mother support groups:        www.[employer]        La Leche League [facility]   Dr. Pablo Mclaughlin website for latch videos and general information:        www.breastfeedinginc.ca  [facility] Risk [facility] is a call center that provides information about the safety of taking medications while breastfeeding.  Call [phone], M-F, 8am-5pm, CT.  International Lactation Consultant Association provides resources for assistance:        www.ilca.org  [place] Breastfeeding Coalition provides informationand resources for parents  and the community    www.LaBreastfeedingSupport.org     Ronna Michel is a mom-to-mom support group:                             www.[employer].com//breastfeedng-support/  Partners for Healthy Babies:  0-820-815-BABY(2951)  [employer]: a breastfeeding support group for women of color, [phone]

## 2020-05-21 NOTE — ANESTHESIA PROCEDURE NOTES
CSE    Patient location during procedure: OR  Start time: 5/21/2020 9:46 AM  Timeout: 5/21/2020 9:44 AM  End time: 5/21/2020 9:55 AM    Staffing  Authorizing Provider: Ramonita Miranda MD  Performing Provider: Jonathon Bustillo MD    Preanesthetic Checklist  Completed: patient identified, site marked, surgical consent, pre-op evaluation, timeout performed, IV checked, risks and benefits discussed and monitors and equipment checked  CSE  Patient position: sitting  Prep: ChloraPrep  Patient monitoring: heart rate and continuous pulse ox  Approach: midline  Spinal Needle  Needle type: Nicolasa   Needle gauge: 25 G  Needle length: 5 in  Epidural Needle  Injection technique: GEOFF saline  Needle type: Tuohy   Needle gauge: 17 G  Needle length: 3.5 in  Needle insertion depth: 6.5 cm  Location: L3-4  Catheter  Catheter type: springwound  Catheter size: 19 G  Catheter at skin depth: 11 cm  Additional Documentation: negative aspiration for heme and no paresthesia on injection  Assessment  Sensory level: T4   Dermatomal levels determined by pinch or prick  Intrathecal Medications:  administered: primary anesthetic mcg of

## 2020-05-21 NOTE — ANESTHESIA PREPROCEDURE EVALUATION
2020  Ann Godfrey is a 39 y.o., female.  [facility]  Anesthesia Pre-Operative Evaluation       Patient Name: Ann Godfrey  YOB: 1980  MRN: [id number]    SUBJECTIVE:     Ann Godfrey is a 39 y.o. female  37w presenting for elective C/Section     Current pregnancy complicated by AMA , with a PMHx of Asthma (not on meds), GERD, Obesity, Previous benign tumor of spinal cord (S/P radiation therapy, and laminectomy. Pt has residual Lt sided lower limb numbness and loss of sensation), and Rt ovarian cyst (with no problems)  - NPO since midnight  - Latest Previous Epidural: Needle insertion depth: 9 cm, and Catheter at skin depth: 14 cm  - Denies previous issues with neuraxial anesthesia.  Denies previous issues with general anesthesia.  Denies family history of issues with anesthesia.  - Denies hx of seizures, strokes, HTN, heart failure, smoking, COPD, acid reflux, liver disease, kidney disease, bleeding disorders, taking blood thinners, back surgery.      Lab Results   Component Value Date     (L) 2020       OB History    Para Term  AB Living   4 2 2 0 1 2   SAB TAB Ectopic Multiple Live Births   0 0 1 0 2      # Outcome Date GA Lbr King/2nd Weight Sex Delivery Anes PTL Lv   4 Current            3 Term 10/10/17 39w0d  4.451 kg (9 lb 13 oz) F CS-LTranv Spinal, EPI N GAUDENCIO   2 Ectopic 2015     ECTOPIC      1 Term 14 40w5d  4.326 kg (9 lb 8.6 oz) F CS-LTranv EPI N GAUDENCIO       Past Surgical History:   Procedure Laterality Date    APPENDECTOMY       SECTION      x2    ECTOPIC PREGNANCY SURGERY      posteriorlaminectomy      For benign spinal tumor;         Patient Active Problem List   Diagnosis    Back pain, lumbosacral    Benign tumor of spinal cord    Undergoing workup for MS    History of  delivery  affecting pregnancy    GERD (gastroesophageal reflux disease)    Depression    Asthma    Urticaria    Obesity (BMI 35.0-39.9 without comorbidity)    Pre-diabetes    Early satiety    Cyst of left ovary    Pregnancy, supervision, high-risk - flu    Advanced maternal age in multigravida/declines genetics.    Maternal asthma complicating pregnancy    Obesity complicating pregnancy    Abnormal antibody titer - anti-E 1:2    Maternal atypical antibody affecting pregnancy in second trimester - Anti E and Anti c    Abdominal pain, nausea, vomiting    Thrombocytopenia affecting pregnancy    Unwanted fertility       Review of patient's allergies indicates:  No Known Allergies    Social History     Socioeconomic History    Marital status:      Spouse name: Not on file    Number of children: Not on file    Years of education: Not on file    Highest education level: Not on file   Occupational History    Not on file   Social Needs    Financial resource strain: Somewhat hard    Food insecurity:     Worry: Never true     Inability: Never true    Transportation needs:     Medical: No     Non-medical: No   Tobacco Use    Smoking status: Former Smoker     Packs/day: 1.00     Years: 13.00     Pack years: 13.00     Types: Cigarettes     Last attempt to quit: 10/20/2012     Years since quittin.5    Smokeless tobacco: Former User   Substance and Sexual Activity    Alcohol use: No     Frequency: 2-4 times a month     Drinks per session: 1 or 2     Binge frequency: Never    Drug use: No    Sexual activity: Yes     Partners: Male     Birth control/protection: None   Lifestyle    Physical activity:     Days per week: 2 days     Minutes per session: Not on file    Stress: To some extent   Relationships    Social connections:     Talks on phone: More than three times a week     Gets together: Once a week     Attends Sabianism service: Not on file     Active member of club or organization: Yes      Attends meetings of clubs or organizations: More than 4 times per year     Relationship status:    Other Topics Concern    Not on file   Social History Narrative    Not on file       OBJECTIVE:     Weight:  Wt Readings from Last 4 Encounters:   05/19/20 124.1 kg (273 lb 9.5 oz)   05/13/20 122.4 kg (269 lb 13.5 oz)   04/30/20 119.7 kg (263 lb 14.3 oz)   04/27/20 118.1 kg (260 lb 5.8 oz)     There is no height or weight on file to calculate BMI.    Outpatient Medications:  No current facility-administered medications on file prior to encounter.      Current Outpatient Medications on File Prior to Encounter   Medication Sig Dispense Refill    albuterol (PROAIR HFA) 90 mcg/actuation inhaler Inhale 2 puffs into the lungs every 6 (six) hours as needed for Wheezing or Shortness of Breath. Rescue 18 g 1    prenatal vit,calc76/iron/folic (PNV 29-1 ORAL) Take by mouth.          Current Inpatient Medications:      BP Readings from Last 3 Encounters:   05/19/20 120/70   05/13/20 128/72   04/30/20 110/70         Chemistry        Component Value Date/Time     04/16/2020 0943    K 4.4 04/16/2020 0943     04/16/2020 0943    CO2 22 (L) 04/16/2020 0943    BUN 6 04/16/2020 0943    CREATININE 0.6 04/16/2020 0943    GLU 91 04/16/2020 0943        Component Value Date/Time    CALCIUM 9.4 04/16/2020 0943    ALKPHOS 78 04/16/2020 0943    AST 28 04/16/2020 0943    ALT 24 04/16/2020 0943    BILITOT 0.2 04/16/2020 0943    ESTGFRAFRICA >60 04/16/2020 0943    EGFRNONAA >60 04/16/2020 0943            Lab Results   Component Value Date    WBC 11.31 04/16/2020    HGB 11.7 (L) 04/16/2020    HCT 36.3 (L) 04/16/2020    MCV 90 04/16/2020     (L) 04/16/2020       No results for input(s): PT, INR, PROTIME, APTT in the last 72 hours.    Anesthesia Evaluation    I have reviewed the Patient Summary Reports.     I have reviewed the Medications.     Review of Systems  Anesthesia Hx:  No problems with previous Anesthesia Denies Hx  of Anesthetic complications  History of prior surgery of interest to airway management or planning: Previous anesthesia: General Denies Family Hx of Anesthesia complications.   Denies Personal Hx of Anesthesia complications.   Social:  Non-Smoker, No Alcohol Use    Hematology/Oncology:  Hematology Normal   Oncology Normal    -- Denies Anemia:    EENT/Dental:EENT/Dental Normal   Cardiovascular:  Cardiovascular Normal   Functional Capacity good / => 4 METS    Pulmonary:  Pulmonary Normal Asthma    Renal/:  Renal/ Normal     Hepatic/GI:  Hepatic/GI Normal GERD    Musculoskeletal:  Musculoskeletal Normal    Neurological:  Neurology Normal C-spine cleared.      Endocrine:  Endocrine Normal    Dermatological:  Skin Normal    Psych:   Psychiatric History          Physical Exam  General:  Well nourished, Obesity    Airway/Jaw/Neck:  Airway Findings: Mouth Opening: Normal Tongue: Normal  General Airway Assessment: Adult  Mallampati: I  Jaw/Neck Findings:  Neck ROM: Normal ROM      Dental:  Dental Findings:    Chest/Lungs:  Chest/Lungs Findings: Normal Respiratory Rate     Heart/Vascular:  Heart Findings: Rate: Normal  Rhythm: Regular Rhythm        Mental Status:  Mental Status Findings:  Cooperative, Alert and Oriented         Anesthesia Plan  Type of Anesthesia, risks & benefits discussed:  Anesthesia Type:  CSE, epidural, spinal, general  Patient's Preference:   Intra-op Monitoring Plan: standard ASA monitors  Intra-op Monitoring Plan Comments:   Post Op Pain Control Plan: multimodal analgesia, IV/PO Opioids PRN and per primary service following discharge from PACU  Post Op Pain Control Plan Comments:   Induction:    Beta Blocker:  Patient is not currently on a Beta-Blocker (No further documentation required).       Informed Consent: Patient understands risks and agrees with Anesthesia plan.  Questions answered. Anesthesia consent signed with patient.  ASA Score: 3     Day of Surgery Review of History & Physical:     H&P update referred to the provider.         Ready For Surgery From Anesthesia Perspective.

## 2020-05-21 NOTE — ANESTHESIA PROCEDURE NOTES
Epidural    Patient location during procedure: OR   Reason for block: primary anesthetic   Diagnosis: Elective section      Staffing  Performing Provider: Jonathon Bustillo MD  Authorizing Provider: Ramonita Miranda MD        Preanesthetic Checklist  Completed: patient identified, site marked, surgical consent, pre-op evaluation, timeout performed, IV checked, risks and benefits discussed, monitors and equipment checked, anesthesia consent given, hand hygiene performed and patient being monitored

## 2020-05-21 NOTE — TRANSFER OF CARE
"Anesthesia Transfer of Care Note    Patient: Ann Godfrey    Procedure(s) Performed: Procedure(s) (LRB):   SECTION, WITH TUBAL LIGATION (Bilateral)    Patient location: Labor and Delivery    Anesthesia Type: CSE    Transport from OR: Transported from OR on room air with adequate spontaneous ventilation    Post pain: adequate analgesia    Post assessment: no apparent anesthetic complications    Post vital signs: stable    Level of consciousness: awake    Nausea/Vomiting: no nausea/vomiting    Complications: none    Transfer of care protocol was followed      Last vitals:   Visit Vitals  Ht 5' 6" (1.676 m)   Wt 123.8 kg (273 lb)   LMP 2019 (Exact Date)   Breastfeeding? No   BMI 44.06 kg/m²     "

## 2020-05-22 LAB
BASOPHILS # BLD AUTO: 0.04 K/UL (ref 0–0.2)
BASOPHILS NFR BLD: 0.3 % (ref 0–1.9)
DIFFERENTIAL METHOD: ABNORMAL
EOSINOPHIL # BLD AUTO: 0.1 K/UL (ref 0–0.5)
EOSINOPHIL NFR BLD: 1 % (ref 0–8)
ERYTHROCYTE [DISTWIDTH] IN BLOOD BY AUTOMATED COUNT: 13.9 % (ref 11.5–14.5)
HCT VFR BLD AUTO: 31.3 % (ref 37–48.5)
HGB BLD-MCNC: 9.9 G/DL (ref 12–16)
IMM GRANULOCYTES # BLD AUTO: 0.13 K/UL (ref 0–0.04)
IMM GRANULOCYTES NFR BLD AUTO: 1.1 % (ref 0–0.5)
LYMPHOCYTES # BLD AUTO: 1.6 K/UL (ref 1–4.8)
LYMPHOCYTES NFR BLD: 13.6 % (ref 18–48)
MCH RBC QN AUTO: 29.1 PG (ref 27–31)
MCHC RBC AUTO-ENTMCNC: 31.6 G/DL (ref 32–36)
MCV RBC AUTO: 92 FL (ref 82–98)
MONOCYTES # BLD AUTO: 0.6 K/UL (ref 0.3–1)
MONOCYTES NFR BLD: 5.5 % (ref 4–15)
NEUTROPHILS # BLD AUTO: 9.1 K/UL (ref 1.8–7.7)
NEUTROPHILS NFR BLD: 78.5 % (ref 38–73)
NRBC BLD-RTO: 0 /100 WBC
PLATELET # BLD AUTO: 102 K/UL (ref 150–350)
PMV BLD AUTO: 12.9 FL (ref 9.2–12.9)
RBC # BLD AUTO: 3.4 M/UL (ref 4–5.4)
WBC # BLD AUTO: 11.65 K/UL (ref 3.9–12.7)

## 2020-05-22 PROCEDURE — 36415 COLL VENOUS BLD VENIPUNCTURE: CPT

## 2020-05-22 PROCEDURE — 85025 COMPLETE CBC W/AUTO DIFF WBC: CPT

## 2020-05-22 PROCEDURE — 25000003 PHARM REV CODE 250: Performed by: STUDENT IN AN ORGANIZED HEALTH CARE EDUCATION/TRAINING PROGRAM

## 2020-05-22 PROCEDURE — 63600175 PHARM REV CODE 636 W HCPCS: Performed by: STUDENT IN AN ORGANIZED HEALTH CARE EDUCATION/TRAINING PROGRAM

## 2020-05-22 PROCEDURE — 99231 SBSQ HOSP IP/OBS SF/LOW 25: CPT | Mod: ,,, | Performed by: OBSTETRICS & GYNECOLOGY

## 2020-05-22 PROCEDURE — 11000001 HC ACUTE MED/SURG PRIVATE ROOM

## 2020-05-22 PROCEDURE — 99231 PR SUBSEQUENT HOSPITAL CARE,LEVL I: ICD-10-PCS | Mod: ,,, | Performed by: OBSTETRICS & GYNECOLOGY

## 2020-05-22 RX ORDER — IBUPROFEN 600 MG/1
600 TABLET ORAL EVERY 6 HOURS PRN
Qty: 30 TABLET | Refills: 1 | Status: SHIPPED | OUTPATIENT
Start: 2020-05-22

## 2020-05-22 RX ORDER — HYDROCODONE BITARTRATE AND ACETAMINOPHEN 5; 325 MG/1; MG/1
1 TABLET ORAL EVERY 6 HOURS PRN
Qty: 30 TABLET | Refills: 0 | Status: SHIPPED | OUTPATIENT
Start: 2020-05-22 | End: 2020-06-09 | Stop reason: SDUPTHER

## 2020-05-22 RX ADMIN — DOCUSATE SODIUM 200 MG: 100 CAPSULE, LIQUID FILLED ORAL at 08:05

## 2020-05-22 RX ADMIN — ACETAMINOPHEN 650 MG: 325 TABLET ORAL at 04:05

## 2020-05-22 RX ADMIN — IBUPROFEN 800 MG: 400 TABLET, FILM COATED ORAL at 06:05

## 2020-05-22 RX ADMIN — DOCUSATE SODIUM 200 MG: 100 CAPSULE, LIQUID FILLED ORAL at 09:05

## 2020-05-22 RX ADMIN — ACETAMINOPHEN 650 MG: 325 TABLET ORAL at 08:05

## 2020-05-22 RX ADMIN — OXYCODONE HYDROCHLORIDE 10 MG: 5 TABLET ORAL at 10:05

## 2020-05-22 RX ADMIN — KETOROLAC TROMETHAMINE 30 MG: 30 INJECTION, SOLUTION INTRAMUSCULAR at 04:05

## 2020-05-22 RX ADMIN — PRENATAL VIT W/ FE FUMARATE-FA TAB 27-0.8 MG 1 TABLET: 27-0.8 TAB at 08:05

## 2020-05-22 RX ADMIN — OXYCODONE HYDROCHLORIDE 5 MG: 5 TABLET ORAL at 01:05

## 2020-05-22 RX ADMIN — ACETAMINOPHEN 650 MG: 325 TABLET ORAL at 01:05

## 2020-05-22 RX ADMIN — IBUPROFEN 800 MG: 400 TABLET, FILM COATED ORAL at 10:05

## 2020-05-22 RX ADMIN — OXYCODONE HYDROCHLORIDE 10 MG: 5 TABLET ORAL at 05:05

## 2020-05-22 NOTE — PLAN OF CARE
Patient safety maintained, side rails up, bed low and locked position. Pt ambulating and voiding independently.  Pain well controlled with PRN pain medication. Fundus midline, firm, with moderate  lochia. Patient responding to infant cues. Dressing in place, clean/dry/intact.  Significant other at bedside and assisting in patient's care. Will continue to monitor.

## 2020-05-22 NOTE — ANESTHESIA POSTPROCEDURE EVALUATION
Anesthesia Post Evaluation    Patient: Ann Godfrey    Procedure(s) Performed: Procedure(s) (LRB):   SECTION, WITH TUBAL LIGATION (Bilateral)    Final Anesthesia Type: CSE    Patient location during evaluation: labor & delivery  Patient participation: Yes- Able to Participate  Level of consciousness: awake and alert  Post-procedure vital signs: reviewed and stable  Pain management: adequate  Airway patency: patent    PONV status at discharge: No PONV  Anesthetic complications: no      Cardiovascular status: stable  Respiratory status: unassisted, room air and spontaneous ventilation  Hydration status: euvolemic  Follow-up not needed.          Vitals Value Taken Time   /67 2020  7:52 AM   Temp 36.7 °C (98 °F) 2020  7:52 AM   Pulse 94 2020  7:52 AM   Resp 17 2020  7:52 AM   SpO2 97 % 2020  7:52 AM         Event Time     Out of Recovery 13:40:00          Pain/Fernando Score: Pain Rating Prior to Med Admin: 2 (2020 10:12 AM)  Pain Rating Post Med Admin: 0 (2020  5:25 AM)

## 2020-05-22 NOTE — LACTATION NOTE
Baby under light when LC entered room. Pt shared that she is continuing to pump and hand express; however, neither yielding colostrum. LC reviewed pump usage and secured tubing.  encouraged Pt to hand express after pumping. LC offered to assist with pumping. Pt declined at this time. Pt aware to call  for assistance when ready.  All questions answered. LC name and number placed on board.

## 2020-05-22 NOTE — PROGRESS NOTES
POSTPARTUM PROGRESS NOTE     Ann Godfrey is a 39 y.o. female POD #1 status post Repeat  section and R tubal ligation (h/o L salpingectomy 2/2 ectopic) at 37w0d in a pregnancy complicated by GTP, MO, asthma and AMA. Patient is doing well this morning. She denies nausea, vomiting, fever or chills.  Patient reports mild abdominal pain that is well relieved by oral pain medications. Lochia is mild and decreasing. Patient is voiding without difficulty and ambulating with no difficulty. She has passed flatus, and has not had BM.  Patient does plan to breast feed. S/p BTL for contraception.     Objective:       Temp:  [97 °F (36.1 °C)-98 °F (36.7 °C)] 98 °F (36.7 °C)  Pulse:  [74-94] 93  Resp:  [16-18] 18  SpO2:  [95 %-98 %] 97 %  BP: (103-120)/(58-70) 103/60    General:   alert, appears stated age and cooperative   Lungs:   clear to auscultation bilaterally   Heart:   regular rate and rhythm, S1, S2 normal, no murmur, click, rub or gallop   Abdomen:  soft, non-tender; bowel sounds normal; no masses,  no organomegaly   Uterus:  firm located at the umblicus.        Incision: Bandage in place, clean, dry and intact   Extremities: peripheral pulses normal, no pedal edema, no clubbing or cyanosis     Lab Review  Recent Results (from the past 4 hour(s))   CBC auto differential    Collection Time: 20  4:05 AM   Result Value Ref Range    WBC 11.65 3.90 - 12.70 K/uL    RBC 3.40 (L) 4.00 - 5.40 M/uL    Hemoglobin 9.9 (L) 12.0 - 16.0 g/dL    Hematocrit 31.3 (L) 37.0 - 48.5 %    Mean Corpuscular Volume 92 82 - 98 fL    Mean Corpuscular Hemoglobin 29.1 27.0 - 31.0 pg    Mean Corpuscular Hemoglobin Conc 31.6 (L) 32.0 - 36.0 g/dL    RDW 13.9 11.5 - 14.5 %    Platelets 102 (L) 150 - 350 K/uL    MPV 12.9 9.2 - 12.9 fL    Immature Granulocytes 1.1 (H) 0.0 - 0.5 %    Gran # (ANC) 9.1 (H) 1.8 - 7.7 K/uL    Immature Grans (Abs) 0.13 (H) 0.00 - 0.04 K/uL    Lymph # 1.6 1.0 - 4.8 K/uL    Mono # 0.6 0.3 - 1.0 K/uL     Eos # 0.1 0.0 - 0.5 K/uL    Baso # 0.04 0.00 - 0.20 K/uL    nRBC 0 0 /100 WBC    Gran% 78.5 (H) 38.0 - 73.0 %    Lymph% 13.6 (L) 18.0 - 48.0 %    Mono% 5.5 4.0 - 15.0 %    Eosinophil% 1.0 0.0 - 8.0 %    Basophil% 0.3 0.0 - 1.9 %    Differential Method Automated        I/O    Intake/Output Summary (Last 24 hours) at 2020 0626  Last data filed at 2020 0500  Gross per 24 hour   Intake 500 ml   Output 2760 ml   Net -2260 ml        Assessment:     Patient Active Problem List   Diagnosis    Back pain, lumbosacral    Benign tumor of spinal cord    Undergoing workup for MS    History of  delivery affecting pregnancy    GERD (gastroesophageal reflux disease)    Depression    Asthma    Urticaria    Obesity (BMI 35.0-39.9 without comorbidity)    Pre-diabetes    Early satiety    Cyst of left ovary    Pregnancy, supervision, high-risk - flu    Advanced maternal age in multigravida/declines genetics.    Maternal asthma complicating pregnancy    Obesity complicating pregnancy    Abnormal antibody titer - anti-E 1:2    Maternal atypical antibody affecting pregnancy in second trimester - Anti E and Anti c    Abdominal pain, nausea, vomiting    Thrombocytopenia affecting pregnancy    Unwanted fertility    History of  delivery    S/P  section/ right tubal ligation         Plan:   1. Postpartum care:  - Patient doing well. Continue routine management and advances.  - Continue PO pain meds. Pain well controlled.  - Heme: H/h: >  - Encourage ambulation  - Circumcision   - Contraception - s/p R tubal ligation (h/o L salpingectomy for ectopic pregnancy)  - Lactation consult PRN    2. GTP  - Stable, minimal bleeding overnight  - AM CBC with platelets: 102  - Will need repeat CBC in 6 weeks     3. MO  - BMI: 44  - Pre pregnancy BMI: 38, does not qualify for post partum lovenox  - Encourage ambulation and use of TEDs/SCDs    4. Asthma  - Stable  - Resp:  [16-18] 18, SpO2:  [95  %-98 %] 97 %  - On albuterol PRN  - Continue to monitor     Dispo: As patient meets milestones, will plan to discharge POD#2-4    Sparkle Lopez MD  OB/GYN  PGY-2

## 2020-05-22 NOTE — LACTATION NOTE
Safe formula feeding handout given and reviewed.  Discussed proper hand washing, expiration time of formula, position of baby, position of nipple and bottle while feeding, baby led feeding and fullness cues. Pt verbalized understanding and verbalized appropriate recall.    iPrint Symphony pump, tubing, collections containers and labels brought to bedside.  Discussed proper pump setting of initiation phase.  Instructed on proper usage of pump and to adjust suction according to maximum comfort level.  Verified appropriate flange fit.  Educated on the frequency and duration of pumping in order to promote and maintain a full milk supply.  Hands on pumping technique reviewed.  Encouraged hand expression after pumping.  Instructed on cleaning of breast pump parts.  Written instructions also given.  Pt verbalized understanding and appropriate recall for proper milk handling, collection, labeling, storage and transportation.

## 2020-05-23 LAB
BLD PROD TYP BPU: NORMAL
BLD PROD TYP BPU: NORMAL
BLOOD UNIT EXPIRATION DATE: NORMAL
BLOOD UNIT EXPIRATION DATE: NORMAL
BLOOD UNIT TYPE CODE: 5100
BLOOD UNIT TYPE CODE: 5100
BLOOD UNIT TYPE: NORMAL
BLOOD UNIT TYPE: NORMAL
CODING SYSTEM: NORMAL
CODING SYSTEM: NORMAL
DISPENSE STATUS: NORMAL
DISPENSE STATUS: NORMAL
NUM UNITS TRANS PACKED RBC: NORMAL
NUM UNITS TRANS PACKED RBC: NORMAL

## 2020-05-23 PROCEDURE — 99232 PR SUBSEQUENT HOSPITAL CARE,LEVL II: ICD-10-PCS | Mod: ,,, | Performed by: OBSTETRICS & GYNECOLOGY

## 2020-05-23 PROCEDURE — 99232 SBSQ HOSP IP/OBS MODERATE 35: CPT | Mod: ,,, | Performed by: OBSTETRICS & GYNECOLOGY

## 2020-05-23 PROCEDURE — 11000001 HC ACUTE MED/SURG PRIVATE ROOM

## 2020-05-23 PROCEDURE — 25000003 PHARM REV CODE 250: Performed by: STUDENT IN AN ORGANIZED HEALTH CARE EDUCATION/TRAINING PROGRAM

## 2020-05-23 RX ADMIN — OXYCODONE HYDROCHLORIDE 10 MG: 5 TABLET ORAL at 09:05

## 2020-05-23 RX ADMIN — OXYCODONE HYDROCHLORIDE 10 MG: 5 TABLET ORAL at 10:05

## 2020-05-23 RX ADMIN — IBUPROFEN 800 MG: 400 TABLET, FILM COATED ORAL at 01:05

## 2020-05-23 RX ADMIN — OXYCODONE HYDROCHLORIDE 10 MG: 5 TABLET ORAL at 05:05

## 2020-05-23 RX ADMIN — OXYCODONE HYDROCHLORIDE 10 MG: 5 TABLET ORAL at 02:05

## 2020-05-23 RX ADMIN — IBUPROFEN 800 MG: 400 TABLET, FILM COATED ORAL at 10:05

## 2020-05-23 RX ADMIN — OXYCODONE HYDROCHLORIDE 10 MG: 5 TABLET ORAL at 06:05

## 2020-05-23 RX ADMIN — ACETAMINOPHEN 650 MG: 325 TABLET ORAL at 01:05

## 2020-05-23 RX ADMIN — DOCUSATE SODIUM 200 MG: 100 CAPSULE, LIQUID FILLED ORAL at 08:05

## 2020-05-23 RX ADMIN — PRENATAL VIT W/ FE FUMARATE-FA TAB 27-0.8 MG 1 TABLET: 27-0.8 TAB at 10:05

## 2020-05-23 RX ADMIN — ACETAMINOPHEN 650 MG: 325 TABLET ORAL at 08:05

## 2020-05-23 RX ADMIN — DOCUSATE SODIUM 200 MG: 100 CAPSULE, LIQUID FILLED ORAL at 10:05

## 2020-05-23 RX ADMIN — IBUPROFEN 800 MG: 400 TABLET, FILM COATED ORAL at 06:05

## 2020-05-23 RX ADMIN — OXYCODONE HYDROCHLORIDE 10 MG: 5 TABLET ORAL at 01:05

## 2020-05-23 RX ADMIN — ACETAMINOPHEN 650 MG: 325 TABLET ORAL at 02:05

## 2020-05-23 NOTE — PROGRESS NOTES
POSTPARTUM PROGRESS NOTE     Ann Godfrey is a 39 y.o. female POD #2 status post Repeat  section and R tubal ligation (h/o L salpingectomy 2/2 ectopic) at 37w0d in a pregnancy complicated by GTP, MO, asthma and AMA. Patient is doing well this morning. She denies nausea, vomiting, fever or chills.  Patient reports mild abdominal pain that is well relieved by oral pain medications. Lochia is mild and decreasing. Patient is voiding without difficulty and ambulating with no difficulty. She has passed flatus, and has not had BM.  Patient does plan to breast feed. S/p BTL for contraception.     Objective:       Temp:  [97 °F (36.1 °C)-98.5 °F (36.9 °C)] 98.5 °F (36.9 °C)  Pulse:  [79-94] 92  Resp:  [17-18] 17  SpO2:  [95 %-97 %] 97 %  BP: (102-110)/(59-70) 105/70    General:   alert, appears stated age and cooperative   Lungs:   clear to auscultation bilaterally   Heart:   regular rate and rhythm, S1, S2 normal, no murmur, click, rub or gallop   Abdomen:  soft, non-tender; bowel sounds normal; no masses,  no organomegaly   Uterus:  firm located at the umblicus.        Incision: Incision c/d/i   Extremities: peripheral pulses normal, no pedal edema, no clubbing or cyanosis     Lab Review  No results found for this or any previous visit (from the past 4 hour(s)).    I/O    Intake/Output Summary (Last 24 hours) at 2020  Last data filed at 2020 0600  Gross per 24 hour   Intake --   Output 1800 ml   Net -1800 ml        Assessment:     Patient Active Problem List   Diagnosis    Back pain, lumbosacral    Benign tumor of spinal cord    Undergoing workup for MS    History of  delivery affecting pregnancy    GERD (gastroesophageal reflux disease)    Depression    Asthma    Urticaria    Obesity (BMI 35.0-39.9 without comorbidity)    Pre-diabetes    Early satiety    Cyst of left ovary    Pregnancy, supervision, high-risk - flu    Advanced maternal age in multigravida/declines  genetics.    Maternal asthma complicating pregnancy    Obesity complicating pregnancy    Abnormal antibody titer - anti-E 1:2    Maternal atypical antibody affecting pregnancy in second trimester - Anti E and Anti c    Abdominal pain, nausea, vomiting    Thrombocytopenia affecting pregnancy    Unwanted fertility    History of  delivery    S/P  section/ right tubal ligation         Plan:   1. Postpartum care:  - Patient doing well. Continue routine management and advances.  - Continue PO pain meds. Pain well controlled.  - Heme: H/h: >  - Encourage ambulation  - Contraception - s/p R tubal ligation (h/o L salpingectomy for ectopic pregnancy)  - Lactation consult PRN    2. GTP  - Stable, minimal bleeding overnight  - AM CBC with platelets: 102  - Will need repeat CBC in 6 weeks     3. MO  - BMI: 44  - Pre pregnancy BMI: 38, does not qualify for post partum lovenox  - Encourage ambulation and use of TEDs/SCDs    4. Asthma  - Stable  - Resp:  [16-18] 18, SpO2:  [95 %-98 %] 97 %  - On albuterol PRN  - Continue to monitor     Dispo: As patient meets milestones, will plan to discharge POD#2-4

## 2020-05-23 NOTE — LACTATION NOTE
05/23/20 1225   Maternal Infant Feeding   Maternal Emotional State independent   Equipment Type   Breast Pump Type double electric, hospital grade   Breast Pump Flange Type hard   Breast Pump Flange Size 24 mm   Breast Pumping   Breast Pumping Interventions post-feed pumping encouraged   Breast Pumping double electric breast pump utilized   Reviewed lactation education, all questions answered. Mom to continue on plan of nursing infant for 15 minutes per breasts, then pump, supplement with EBM/ABM. Mom agreeable to plan. Mom to call  for latch check next feeding.

## 2020-05-24 LAB
FINAL PATHOLOGIC DIAGNOSIS: NORMAL
GROSS: NORMAL

## 2020-05-24 PROCEDURE — 25000003 PHARM REV CODE 250: Performed by: STUDENT IN AN ORGANIZED HEALTH CARE EDUCATION/TRAINING PROGRAM

## 2020-05-24 PROCEDURE — 99238 HOSP IP/OBS DSCHRG MGMT 30/<: CPT | Mod: ,,, | Performed by: OBSTETRICS & GYNECOLOGY

## 2020-05-24 PROCEDURE — 99238 PR HOSPITAL DISCHARGE DAY,<30 MIN: ICD-10-PCS | Mod: ,,, | Performed by: OBSTETRICS & GYNECOLOGY

## 2020-05-24 PROCEDURE — 11000001 HC ACUTE MED/SURG PRIVATE ROOM

## 2020-05-24 RX ORDER — FERROUS SULFATE 325(65) MG
325 TABLET, DELAYED RELEASE (ENTERIC COATED) ORAL DAILY
Qty: 30 TABLET | Refills: 3 | Status: SHIPPED | OUTPATIENT
Start: 2020-05-24

## 2020-05-24 RX ORDER — FERROUS SULFATE 325(65) MG
325 TABLET, DELAYED RELEASE (ENTERIC COATED) ORAL DAILY
Status: DISCONTINUED | OUTPATIENT
Start: 2020-05-24 | End: 2020-05-25 | Stop reason: HOSPADM

## 2020-05-24 RX ADMIN — OXYCODONE HYDROCHLORIDE 10 MG: 5 TABLET ORAL at 10:05

## 2020-05-24 RX ADMIN — OXYCODONE HYDROCHLORIDE 10 MG: 5 TABLET ORAL at 01:05

## 2020-05-24 RX ADMIN — IBUPROFEN 800 MG: 400 TABLET, FILM COATED ORAL at 06:05

## 2020-05-24 RX ADMIN — FERROUS SULFATE TAB EC 325 MG (65 MG FE EQUIVALENT) 325 MG: 325 (65 FE) TABLET DELAYED RESPONSE at 09:05

## 2020-05-24 RX ADMIN — DOCUSATE SODIUM 200 MG: 100 CAPSULE, LIQUID FILLED ORAL at 09:05

## 2020-05-24 RX ADMIN — OXYCODONE HYDROCHLORIDE 10 MG: 5 TABLET ORAL at 06:05

## 2020-05-24 RX ADMIN — ACETAMINOPHEN 650 MG: 325 TABLET ORAL at 09:05

## 2020-05-24 RX ADMIN — IBUPROFEN 800 MG: 400 TABLET, FILM COATED ORAL at 10:05

## 2020-05-24 RX ADMIN — OXYCODONE HYDROCHLORIDE 10 MG: 5 TABLET ORAL at 04:05

## 2020-05-24 RX ADMIN — ACETAMINOPHEN 650 MG: 325 TABLET ORAL at 04:05

## 2020-05-24 RX ADMIN — OXYCODONE HYDROCHLORIDE 10 MG: 5 TABLET ORAL at 08:05

## 2020-05-24 RX ADMIN — ACETAMINOPHEN 650 MG: 325 TABLET ORAL at 01:05

## 2020-05-24 RX ADMIN — ACETAMINOPHEN 650 MG: 325 TABLET ORAL at 10:05

## 2020-05-24 RX ADMIN — PRENATAL VIT W/ FE FUMARATE-FA TAB 27-0.8 MG 1 TABLET: 27-0.8 TAB at 09:05

## 2020-05-24 RX ADMIN — IBUPROFEN 800 MG: 400 TABLET, FILM COATED ORAL at 01:05

## 2020-05-24 RX ADMIN — DOCUSATE SODIUM 200 MG: 100 CAPSULE, LIQUID FILLED ORAL at 08:05

## 2020-05-24 NOTE — DISCHARGE SUMMARY
Delivery Discharge Summary  Obstetrics    Primary OB Clinician: Yamileth Weber MD     Admission date: 2020  Discharge date: 2020    Disposition: To home, self care    Discharge Diagnosis List:      Patient Active Problem List   Diagnosis    Back pain, lumbosacral    Benign tumor of spinal cord    Undergoing workup for MS    History of  delivery affecting pregnancy    GERD (gastroesophageal reflux disease)    Depression    Asthma    Urticaria    Obesity (BMI 35.0-39.9 without comorbidity)    Pre-diabetes    Early satiety    Cyst of left ovary    Pregnancy, supervision, high-risk - flu    Advanced maternal age in multigravida/declines genetics.    Maternal asthma complicating pregnancy    Obesity complicating pregnancy    Abnormal antibody titer - anti-E 1:2    Maternal atypical antibody affecting pregnancy in second trimester - Anti E and Anti c    Abdominal pain, nausea, vomiting    Thrombocytopenia affecting pregnancy    Unwanted fertility    History of  delivery    S/P  section/ right tubal ligation      Procedure: Repeat LTCS + R tubal ligation (h/o L salpingectomy 2/2 ectopic)    Hospital Course:  Ann Godfrey is a 39 y.o. now , POD #4 who was admitted on 2020 at 37w0d for scheduled Repeat LTCS + R tubal ligation (h/o L salpingectomy 2/2 ectopic). Patient was subsequently admitted to labor and delivery unit with signed consents.      section with R tubal ligation (patient with h/o L salpingectomy) was performed without complications. Please see delivery note for further details.     Her postpartum course was uncomplicated. On discharge day, patient's pain is controlled with oral pain medications. Pt is tolerating ambulation without SOB or CP, and regular diet without N/V. Reports lochia is mild. Denies any HA, vision changes, F/C, LE swelling. Denies any breast pain/soreness.    Pt in stable condition and ready for  discharge. She has been instructed to start and/or continue medications and follow up with her obstetrics provider as listed below.    Pertinent studies:  CBC  Recent Labs   Lab 20  0910 20  0405   WBC 10.37 11.65   HGB 11.6* 9.9*   HCT 36.2* 31.3*   MCV 91 92   * 102*          Immunization History   Administered Date(s) Administered    Influenza - Quadrivalent - PF (6 months and older) 10/30/2019    Tdap 2014, 2020        Delivery:    Episiotomy: None   Lacerations: None   Repair suture: None   Repair # of packets:     Blood loss (ml):       Birth information:  YOB: 2018   Time of birth: 10:23 AM   Sex: female   Delivery type: , Low Transverse   Gestational Age: 37w0d    Delivery Clinician:      Other providers:       Additional  information:  Forceps:    Vacuum:    Breech:    Observed anomalies      Living?:           APGARS  One minute Five minutes Ten minutes   Skin color:         Heart rate:         Grimace:         Muscle tone:         Breathing:         Totals: 9  9        Placenta: Delivered:       appearance      Patient Instructions:   Current Discharge Medication List      START taking these medications    Details   ferrous sulfate 325 (65 FE) MG EC tablet Take 1 tablet (325 mg total) by mouth once daily.  Qty: 30 tablet, Refills: 3      HYDROcodone-acetaminophen (NORCO) 5-325 mg per tablet Take 1 tablet by mouth every 6 (six) hours as needed.  Qty: 30 tablet, Refills: 0    Comments: Quantity prescribed more than 7 day supply? Yes, quantity medically necessary      ibuprofen (ADVIL,MOTRIN) 600 MG tablet Take 1 tablet (600 mg total) by mouth every 6 (six) hours as needed.  Qty: 30 tablet, Refills: 1         CONTINUE these medications which have NOT CHANGED    Details   albuterol (PROAIR HFA) 90 mcg/actuation inhaler Inhale 2 puffs into the lungs every 6 (six) hours as needed for Wheezing or Shortness of Breath. Rescue  Qty: 18 g, Refills: 1     Associated Diagnoses: Acute non-recurrent sinusitis, unspecified location      prenatal vit,calc76/iron/folic (PNV 29-1 ORAL) Take by mouth.             Discharge Procedure Orders   Diet Adult Regular     Pelvic Rest   Order Comments: Strict pelvic rest - nothing in the vagina for 6 weeks (no sex, tampons, douching, tampons, etc.)  No driving while taking narcotic pain medication or until you feel as though you can safely slam on the brakes if necessary     Lifting restrictions   Order Comments: No lifting anything heavier than 10 pounds for the next 4-6 weeks     Notify your health care provider if you experience any of the following:  temperature >100.4     Notify your health care provider if you experience any of the following:  persistent nausea and vomiting or diarrhea     Notify your health care provider if you experience any of the following:  severe uncontrolled pain     Notify your health care provider if you experience any of the following:  redness, tenderness, or signs of infection (pain, swelling, redness, odor or green/yellow discharge around incision site)     Notify your health care provider if you experience any of the following:  difficulty breathing or increased cough     Notify your health care provider if you experience any of the following:  severe persistent headache     Notify your health care provider if you experience any of the following:  worsening rash     Notify your health care provider if you experience any of the following:  persistent dizziness, light-headedness, or visual disturbances     Notify your health care provider if you experience any of the following:  increased confusion or weakness     Notify your health care provider if you experience any of the following:   Order Comments: Heavy vaginal bleeding saturating more than 1 pad per hour for at least 2 hours     No dressing needed     Activity as tolerated       Follow-up Information     [facility]. Schedule an  appointment as soon as possible for a visit in 6 weeks.    Specialty:  Obstetrics and Gynecology  Why:  Postpartum Visit  Contact information:  [address] [place] [address] [phone]                  Beatrice Bridges M.D.  OB/GYN PGY-1    I have reviewed and concur with the resident's history, physical assessment and plan.  I have personally interviewed and examined the patient at bedside.    Elena Gan MD  Obstetrics and Gynecology  [facility]

## 2020-05-24 NOTE — PROGRESS NOTES
POSTPARTUM PROGRESS NOTE    Ann Godfrey is a 39 y.o. female POD #3 status post repeat  section and R tubal ligation (h/o L salpingectomy 2/2 ectopic) at 37w0d in a pregnancy complicated by GTP, MO, abnormal antibody titer, asthma and AMA.    Patient is doing well this morning. She denies nausea, vomiting, fever or chills. Patient reports mild abdominal pain that is well relieved by oral pain medications. Lochia is mild and decreasing. Patient is voiding without difficulty and ambulating with no difficulty. She has passed flatus, and has not had BM.    Patient does plan to breast feed. S/p BTL for contraception.     Objective:       Temp:  [97.6 °F (36.4 °C)-98 °F (36.7 °C)] 98 °F (36.7 °C)  Pulse:  [84-93] 84  Resp:  [17-18] 18  SpO2:  [96 %-99 %] 99 %  BP: (112-118)/(64-70) 118/64    General:   alert, appears stated age and cooperative   Lungs:   non-labored respirations   Heart:   regular rate and rhythm   Abdomen:  soft, non-tender; bowel sounds normal; no masses,  no organomegaly   Uterus: firm located at the umbilicus   Incision: incision c/d/i without erythema or drainage   Extremities: peripheral pulses normal, no pedal edema, no clubbing or cyanosis     Lab Review  No results found for this or any previous visit (from the past 4 hour(s)).    I/O  No intake or output data in the 24 hours ending 20 0559     Assessment:     Patient Active Problem List   Diagnosis    Back pain, lumbosacral    Benign tumor of spinal cord    Undergoing workup for MS    History of  delivery affecting pregnancy    GERD (gastroesophageal reflux disease)    Depression    Asthma    Urticaria    Obesity (BMI 35.0-39.9 without comorbidity)    Pre-diabetes    Early satiety    Cyst of left ovary    Pregnancy, supervision, high-risk - flu    Advanced maternal age in multigravida/declines genetics.    Maternal asthma complicating pregnancy    Obesity complicating pregnancy    Abnormal  antibody titer - anti-E 1:2    Maternal atypical antibody affecting pregnancy in second trimester - Anti E and Anti c    Abdominal pain, nausea, vomiting    Thrombocytopenia affecting pregnancy    Unwanted fertility    History of  delivery    S/P  section/ right tubal ligation         Plan:   1. Postpartum care:  - Patient doing well. Continue routine management and advances.  - Continue PO pain meds. Pain well controlled.  - Heme: H/h: >  - Encourage ambulation  - Contraception - s/p R tubal ligation (h/o L salpingectomy for ectopic pregnancy)  - Lactation consult PRN  - Rh positive    2. GTP:  - Stable, minimal bleeding overnight  - AM CBC with platelets: 102  - Will need repeat CBC in 6 weeks     3. MO:  - BMI: 44  - Pre pregnancy BMI: 38, does not qualify for post partum lovenox  - Encourage ambulation and use of TEDs/SCDs    4. Asthma:  - Stable  - SpO2:  [96 %-99 %] 99 %  - On albuterol PRN  - Continue to monitor     5. ABL Anemia:  - H/h as above  - Asx  - Fe/colace      Dispo: As patient meets milestones, will plan to discharge POD#3-4.      Beatrice Bridges M.D.  OB/GYN PGY-1

## 2020-05-24 NOTE — LACTATION NOTE
05/24/20 0925   Maternal Assessment   Breast Shape Bilateral:;round   Breast Density Bilateral:;full   Maternal Infant Feeding   Maternal Emotional State independent   Infant Positioning clutch/football   Signs of Milk Transfer audible swallow;infant jaw motion present   Pain with Feeding no   Latch Assistance no   Equipment Type   Breast Pump Type double electric, hospital grade   Breast Pump Flange Type hard   Breast Pump Flange Size 24 mm   Breast Pumping   Breast Pumping Interventions post-feed pumping encouraged   Breast Pumping double electric breast pump utilized   Reviewed lactation education, all questions answered. Infant nursing in football to right breast, rhythmic sucking noted with audible swallows. Infant needs breast compression/stimulation to stay active, LC assisted, mom needs reminders to keep infant active. Infant under phototherapy.  Mom states infant cluster fed earlier and had not used breast pump. Instructed mom to nurse infant for 30 minutes total so can get phototherapy as much as possible. Mom to then pump and give EBM/ABM until content while infant under phototherapy, v/u. Mom to call LC as needed for further assistance.

## 2020-05-25 VITALS
TEMPERATURE: 98 F | HEIGHT: 66 IN | OXYGEN SATURATION: 96 % | HEART RATE: 94 BPM | SYSTOLIC BLOOD PRESSURE: 131 MMHG | BODY MASS INDEX: 43.87 KG/M2 | WEIGHT: 273 LBS | DIASTOLIC BLOOD PRESSURE: 79 MMHG | RESPIRATION RATE: 18 BRPM

## 2020-05-25 PROCEDURE — 25000003 PHARM REV CODE 250: Performed by: STUDENT IN AN ORGANIZED HEALTH CARE EDUCATION/TRAINING PROGRAM

## 2020-05-25 RX ORDER — ADHESIVE BANDAGE
30 BANDAGE TOPICAL 2 TIMES DAILY PRN
Status: DISCONTINUED | OUTPATIENT
Start: 2020-05-25 | End: 2020-05-25 | Stop reason: HOSPADM

## 2020-05-25 RX ADMIN — OXYCODONE HYDROCHLORIDE 10 MG: 5 TABLET ORAL at 12:05

## 2020-05-25 RX ADMIN — DOCUSATE SODIUM 200 MG: 100 CAPSULE, LIQUID FILLED ORAL at 09:05

## 2020-05-25 RX ADMIN — PRENATAL VIT W/ FE FUMARATE-FA TAB 27-0.8 MG 1 TABLET: 27-0.8 TAB at 09:05

## 2020-05-25 RX ADMIN — FERROUS SULFATE TAB EC 325 MG (65 MG FE EQUIVALENT) 325 MG: 325 (65 FE) TABLET DELAYED RESPONSE at 09:05

## 2020-05-25 RX ADMIN — MAGNESIUM HYDROXIDE 2400 MG: 2400 SUSPENSION ORAL at 01:05

## 2020-05-25 RX ADMIN — ACETAMINOPHEN 650 MG: 325 TABLET ORAL at 04:05

## 2020-05-25 RX ADMIN — IBUPROFEN 800 MG: 400 TABLET, FILM COATED ORAL at 02:05

## 2020-05-25 RX ADMIN — ACETAMINOPHEN 650 MG: 325 TABLET ORAL at 07:05

## 2020-05-25 RX ADMIN — OXYCODONE HYDROCHLORIDE 10 MG: 5 TABLET ORAL at 07:05

## 2020-05-25 RX ADMIN — OXYCODONE HYDROCHLORIDE 10 MG: 5 TABLET ORAL at 04:05

## 2020-05-25 NOTE — PROGRESS NOTES
POSTPARTUM PROGRESS NOTE    Ann Godfrey is a 39 y.o. female POD #4 status post repeat  section and R tubal ligation (h/o L salpingectomy 2/2 ectopic) at 37w0d in a pregnancy complicated by GTP, MO, abnormal antibody titer, asthma and AMA.    Patient is doing well this morning. She denies nausea, vomiting, fever or chills. Patient reports mild abdominal pain that is well relieved by oral pain medications. Lochia is mild and decreasing. Patient is voiding without difficulty and ambulating with no difficulty. She has passed flatus, and has not had BM.    Patient does plan to breast feed. S/p BTL for contraception.     Objective:       Temp:  [97.7 °F (36.5 °C)-98 °F (36.7 °C)] 97.7 °F (36.5 °C)  Pulse:  [88-94] 88  Resp:  [17-18] 18  SpO2:  [93 %-96 %] 96 %  BP: (112-126)/(60-74) 112/60    General:   alert, appears stated age and cooperative   Lungs:   non-labored respirations   Heart:   regular rate and rhythm   Abdomen:  soft, non-tender; bowel sounds normal; no masses,  no organomegaly   Uterus: firm located at the umbilicus   Incision: incision c/d/i without erythema or drainage   Extremities: peripheral pulses normal, no pedal edema, no clubbing or cyanosis     Lab Review  No results found for this or any previous visit (from the past 4 hour(s)).    I/O  No intake or output data in the 24 hours ending 20 0635     Assessment:     Patient Active Problem List   Diagnosis    Back pain, lumbosacral    Benign tumor of spinal cord    Undergoing workup for MS    History of  delivery affecting pregnancy    GERD (gastroesophageal reflux disease)    Depression    Asthma    Urticaria    Obesity (BMI 35.0-39.9 without comorbidity)    Pre-diabetes    Early satiety    Cyst of left ovary    Pregnancy, supervision, high-risk - flu    Advanced maternal age in multigravida/declines genetics.    Maternal asthma complicating pregnancy    Obesity complicating pregnancy    Abnormal  antibody titer - anti-E 1:2    Maternal atypical antibody affecting pregnancy in second trimester - Anti E and Anti c    Abdominal pain, nausea, vomiting    Thrombocytopenia affecting pregnancy    Unwanted fertility    History of  delivery    S/P  section/ right tubal ligation         Plan:   1. Postpartum care:  - Patient doing well. Continue routine management and advances.  - Continue PO pain meds. Pain well controlled.  - Heme: H/h: >  - Encourage ambulation  - Contraception - s/p R tubal ligation (h/o L salpingectomy for ectopic pregnancy)  - Lactation consult PRN  - Rh positive    2. GTP:  - Stable, minimal bleeding overnight  - AM CBC with platelets: 102  - Will need repeat CBC in 6 weeks     3. MO:  - BMI: 44  - Pre pregnancy BMI: 38, does not qualify for post partum Lovenox  - Encourage ambulation and use of TEDs/SCDs    4. Asthma:  - Stable  - SpO2:  [93 %-96 %] 96 %  - On albuterol PRN  - Continue to monitor     5. ABL Anemia:  - H/h as above  - Asx  - Fe/colace      Dispo: As patient meets milestones, will plan to discharge POD#4.      Beatrice Bridges M.D.  OB/GYN PGY-1    I have reviewed and concur with the resident's history, physical assessment and plan.  I have personally interviewed and examined the patient at bedside.    Elena Gan MD  Obstetrics and Gynecology  [facility]

## 2020-05-25 NOTE — LACTATION NOTE
05/25/20 1020   Maternal Assessment   Breast Shape Bilateral:;round   Areola Bilateral:;elastic   Nipples Bilateral:;everted   Maternal Infant Feeding   Maternal Emotional State independent;relaxed   Equipment Type   Breast Pump Type double electric, hospital grade;other (see comments)  (has hand pump at home)   Breast Pump Flange Type hard   Breast Pump Flange Size 24 mm   Breast Pumping   Breast Pumping Interventions post-feed pumping encouraged   Breast Pumping double electric breast pump utilized   Lactation Referrals   Lactation Referrals outpatient lactation program;pediatric care provider;support group   Patient presently single pumping R breast. Her milk is in. Presently she is pumping after nursing and supplementing baby with EBM. She reports she has a hand pump at home and that is all she needs. She does not plan to pump and bottle feed but exclusively breastfeed. Mother has been independent with nursing and verbalizes no questions. Lactation discharge instructions given.

## 2020-05-26 ENCOUNTER — PATIENT MESSAGE (OUTPATIENT)
Dept: OBSTETRICS AND GYNECOLOGY | Facility: CLINIC | Age: 40
End: 2020-05-26

## 2020-05-28 ENCOUNTER — HOSPITAL ENCOUNTER (OUTPATIENT)
Facility: OTHER | Age: 40
Discharge: HOME | End: 2020-05-29
Attending: OBSTETRICS & GYNECOLOGY | Admitting: STUDENT IN AN ORGANIZED HEALTH CARE EDUCATION/TRAINING PROGRAM
Payer: MEDICAID

## 2020-05-28 DIAGNOSIS — R10.31 RIGHT LOWER QUADRANT ABDOMINAL PAIN: Primary | ICD-10-CM

## 2020-05-28 DIAGNOSIS — S30.1XXA ABDOMINAL HEMATOMA: ICD-10-CM

## 2020-05-28 DIAGNOSIS — Z98.891 S/P CESAREAN SECTION: ICD-10-CM

## 2020-05-28 PROBLEM — R79.89 ELEVATED LFTS: Status: ACTIVE | Noted: 2020-05-28

## 2020-05-28 PROBLEM — R10.84 GENERALIZED ABDOMINAL PAIN: Status: ACTIVE | Noted: 2020-05-28

## 2020-05-28 LAB
ALBUMIN SERPL BCP-MCNC: 2.9 G/DL (ref 3.5–5.2)
ALBUMIN SERPL BCP-MCNC: 3 G/DL (ref 3.5–5.2)
ALP SERPL-CCNC: 76 U/L (ref 55–135)
ALP SERPL-CCNC: 79 U/L (ref 55–135)
ALT SERPL W/O P-5'-P-CCNC: 85 U/L (ref 10–44)
ALT SERPL W/O P-5'-P-CCNC: 88 U/L (ref 10–44)
AMYLASE SERPL-CCNC: 25 U/L (ref 20–110)
ANION GAP SERPL CALC-SCNC: 10 MMOL/L (ref 8–16)
ANION GAP SERPL CALC-SCNC: 11 MMOL/L (ref 8–16)
APAP SERPL-MCNC: <3 UG/ML (ref 10–20)
AST SERPL-CCNC: 123 U/L (ref 10–40)
AST SERPL-CCNC: 133 U/L (ref 10–40)
BACTERIA #/AREA URNS HPF: NORMAL /HPF
BASOPHILS # BLD AUTO: 0.02 K/UL (ref 0–0.2)
BASOPHILS # BLD AUTO: 0.03 K/UL (ref 0–0.2)
BASOPHILS NFR BLD: 0.2 % (ref 0–1.9)
BASOPHILS NFR BLD: 0.4 % (ref 0–1.9)
BILIRUB SERPL-MCNC: 0.4 MG/DL (ref 0.1–1)
BILIRUB SERPL-MCNC: 0.4 MG/DL (ref 0.1–1)
BILIRUB UR QL STRIP: NEGATIVE
BUN SERPL-MCNC: 12 MG/DL (ref 6–20)
BUN SERPL-MCNC: 14 MG/DL (ref 6–20)
CALCIUM SERPL-MCNC: 8.3 MG/DL (ref 8.7–10.5)
CALCIUM SERPL-MCNC: 8.7 MG/DL (ref 8.7–10.5)
CHLORIDE SERPL-SCNC: 107 MMOL/L (ref 95–110)
CHLORIDE SERPL-SCNC: 108 MMOL/L (ref 95–110)
CLARITY UR: CLEAR
CO2 SERPL-SCNC: 20 MMOL/L (ref 23–29)
CO2 SERPL-SCNC: 21 MMOL/L (ref 23–29)
COLOR UR: YELLOW
CREAT SERPL-MCNC: 0.6 MG/DL (ref 0.5–1.4)
CREAT SERPL-MCNC: 0.6 MG/DL (ref 0.5–1.4)
DIFFERENTIAL METHOD: ABNORMAL
DIFFERENTIAL METHOD: ABNORMAL
EOSINOPHIL # BLD AUTO: 0.1 K/UL (ref 0–0.5)
EOSINOPHIL # BLD AUTO: 0.3 K/UL (ref 0–0.5)
EOSINOPHIL NFR BLD: 0.7 % (ref 0–8)
EOSINOPHIL NFR BLD: 5 % (ref 0–8)
ERYTHROCYTE [DISTWIDTH] IN BLOOD BY AUTOMATED COUNT: 13.6 % (ref 11.5–14.5)
ERYTHROCYTE [DISTWIDTH] IN BLOOD BY AUTOMATED COUNT: 13.7 % (ref 11.5–14.5)
EST. GFR  (AFRICAN AMERICAN): >60 ML/MIN/1.73 M^2
EST. GFR  (AFRICAN AMERICAN): >60 ML/MIN/1.73 M^2
EST. GFR  (NON AFRICAN AMERICAN): >60 ML/MIN/1.73 M^2
EST. GFR  (NON AFRICAN AMERICAN): >60 ML/MIN/1.73 M^2
GLUCOSE SERPL-MCNC: 114 MG/DL (ref 70–110)
GLUCOSE SERPL-MCNC: 118 MG/DL (ref 70–110)
GLUCOSE UR QL STRIP: NEGATIVE
HCT VFR BLD AUTO: 26.8 % (ref 37–48.5)
HCT VFR BLD AUTO: 29.8 % (ref 37–48.5)
HGB BLD-MCNC: 8.5 G/DL (ref 12–16)
HGB BLD-MCNC: 9.6 G/DL (ref 12–16)
HGB UR QL STRIP: ABNORMAL
IMM GRANULOCYTES # BLD AUTO: 0.1 K/UL (ref 0–0.04)
IMM GRANULOCYTES # BLD AUTO: 0.17 K/UL (ref 0–0.04)
IMM GRANULOCYTES NFR BLD AUTO: 1.5 % (ref 0–0.5)
IMM GRANULOCYTES NFR BLD AUTO: 1.6 % (ref 0–0.5)
KETONES UR QL STRIP: NEGATIVE
LEUKOCYTE ESTERASE UR QL STRIP: NEGATIVE
LIPASE SERPL-CCNC: 15 U/L (ref 4–60)
LYMPHOCYTES # BLD AUTO: 0.8 K/UL (ref 1–4.8)
LYMPHOCYTES # BLD AUTO: 1.3 K/UL (ref 1–4.8)
LYMPHOCYTES NFR BLD: 19.6 % (ref 18–48)
LYMPHOCYTES NFR BLD: 7.4 % (ref 18–48)
MCH RBC QN AUTO: 29 PG (ref 27–31)
MCH RBC QN AUTO: 29.4 PG (ref 27–31)
MCHC RBC AUTO-ENTMCNC: 31.7 G/DL (ref 32–36)
MCHC RBC AUTO-ENTMCNC: 32.2 G/DL (ref 32–36)
MCV RBC AUTO: 91 FL (ref 82–98)
MCV RBC AUTO: 92 FL (ref 82–98)
MICROSCOPIC COMMENT: NORMAL
MONOCYTES # BLD AUTO: 0.5 K/UL (ref 0.3–1)
MONOCYTES # BLD AUTO: 0.5 K/UL (ref 0.3–1)
MONOCYTES NFR BLD: 4.5 % (ref 4–15)
MONOCYTES NFR BLD: 7.1 % (ref 4–15)
NEUTROPHILS # BLD AUTO: 4.5 K/UL (ref 1.8–7.7)
NEUTROPHILS # BLD AUTO: 9 K/UL (ref 1.8–7.7)
NEUTROPHILS NFR BLD: 66.4 % (ref 38–73)
NEUTROPHILS NFR BLD: 85.6 % (ref 38–73)
NITRITE UR QL STRIP: NEGATIVE
NRBC BLD-RTO: 0 /100 WBC
NRBC BLD-RTO: 0 /100 WBC
PH UR STRIP: 5 [PH] (ref 5–8)
PLATELET # BLD AUTO: 161 K/UL (ref 150–350)
PLATELET # BLD AUTO: 175 K/UL (ref 150–350)
PMV BLD AUTO: 11.4 FL (ref 9.2–12.9)
PMV BLD AUTO: 11.5 FL (ref 9.2–12.9)
POCT GLUCOSE: 212 MG/DL (ref 70–110)
POTASSIUM SERPL-SCNC: 4.4 MMOL/L (ref 3.5–5.1)
POTASSIUM SERPL-SCNC: 4.4 MMOL/L (ref 3.5–5.1)
PROT SERPL-MCNC: 5.9 G/DL (ref 6–8.4)
PROT SERPL-MCNC: 6.2 G/DL (ref 6–8.4)
PROT UR QL STRIP: NEGATIVE
RBC # BLD AUTO: 2.93 M/UL (ref 4–5.4)
RBC # BLD AUTO: 3.26 M/UL (ref 4–5.4)
RBC #/AREA URNS HPF: 2 /HPF (ref 0–4)
SARS-COV-2 RDRP RESP QL NAA+PROBE: NEGATIVE
SODIUM SERPL-SCNC: 138 MMOL/L (ref 136–145)
SODIUM SERPL-SCNC: 139 MMOL/L (ref 136–145)
SP GR UR STRIP: 1.01 (ref 1–1.03)
SQUAMOUS #/AREA URNS HPF: 12 /HPF
URN SPEC COLLECT METH UR: ABNORMAL
UROBILINOGEN UR STRIP-ACNC: NEGATIVE EU/DL
WBC # BLD AUTO: 10.5 K/UL (ref 3.9–12.7)
WBC # BLD AUTO: 6.79 K/UL (ref 3.9–12.7)
WBC #/AREA URNS HPF: 3 /HPF (ref 0–5)
WBC CLUMPS URNS QL MICRO: NORMAL
YEAST URNS QL MICRO: NORMAL

## 2020-05-28 PROCEDURE — 99900035 HC TECH TIME PER 15 MIN (STAT)

## 2020-05-28 PROCEDURE — 25000003 PHARM REV CODE 250: Performed by: OBSTETRICS & GYNECOLOGY

## 2020-05-28 PROCEDURE — U0002 COVID-19 LAB TEST NON-CDC: HCPCS

## 2020-05-28 PROCEDURE — 63600175 PHARM REV CODE 636 W HCPCS: Performed by: OBSTETRICS & GYNECOLOGY

## 2020-05-28 PROCEDURE — 99284 EMERGENCY DEPT VISIT MOD MDM: CPT | Mod: 24,,, | Performed by: OBSTETRICS & GYNECOLOGY

## 2020-05-28 PROCEDURE — 25000003 PHARM REV CODE 250: Performed by: STUDENT IN AN ORGANIZED HEALTH CARE EDUCATION/TRAINING PROGRAM

## 2020-05-28 PROCEDURE — 63600175 PHARM REV CODE 636 W HCPCS: Performed by: STUDENT IN AN ORGANIZED HEALTH CARE EDUCATION/TRAINING PROGRAM

## 2020-05-28 PROCEDURE — 82150 ASSAY OF AMYLASE: CPT

## 2020-05-28 PROCEDURE — G0378 HOSPITAL OBSERVATION PER HR: HCPCS

## 2020-05-28 PROCEDURE — 96376 TX/PRO/DX INJ SAME DRUG ADON: CPT

## 2020-05-28 PROCEDURE — 80053 COMPREHEN METABOLIC PANEL: CPT | Mod: 91

## 2020-05-28 PROCEDURE — 83690 ASSAY OF LIPASE: CPT

## 2020-05-28 PROCEDURE — 82962 GLUCOSE BLOOD TEST: CPT

## 2020-05-28 PROCEDURE — 96375 TX/PRO/DX INJ NEW DRUG ADDON: CPT

## 2020-05-28 PROCEDURE — 99284 PR EMERGENCY DEPT VISIT,LEVEL IV: ICD-10-PCS | Mod: 24,,, | Performed by: OBSTETRICS & GYNECOLOGY

## 2020-05-28 PROCEDURE — 99285 EMERGENCY DEPT VISIT HI MDM: CPT | Mod: 25

## 2020-05-28 PROCEDURE — 85025 COMPLETE CBC W/AUTO DIFF WBC: CPT | Mod: 91

## 2020-05-28 PROCEDURE — 25500020 PHARM REV CODE 255: Performed by: STUDENT IN AN ORGANIZED HEALTH CARE EDUCATION/TRAINING PROGRAM

## 2020-05-28 PROCEDURE — 96365 THER/PROPH/DIAG IV INF INIT: CPT | Mod: 59

## 2020-05-28 PROCEDURE — 80329 ANALGESICS NON-OPIOID 1 OR 2: CPT

## 2020-05-28 PROCEDURE — 80053 COMPREHEN METABOLIC PANEL: CPT

## 2020-05-28 PROCEDURE — 36415 COLL VENOUS BLD VENIPUNCTURE: CPT

## 2020-05-28 PROCEDURE — 81000 URINALYSIS NONAUTO W/SCOPE: CPT

## 2020-05-28 RX ORDER — OXYCODONE HYDROCHLORIDE 5 MG/1
5 TABLET ORAL EVERY 4 HOURS PRN
Status: DISCONTINUED | OUTPATIENT
Start: 2020-05-28 | End: 2020-05-29 | Stop reason: HOSPADM

## 2020-05-28 RX ORDER — DOCUSATE SODIUM 100 MG/1
200 CAPSULE, LIQUID FILLED ORAL 2 TIMES DAILY
Status: DISCONTINUED | OUTPATIENT
Start: 2020-05-28 | End: 2020-05-29 | Stop reason: HOSPADM

## 2020-05-28 RX ORDER — AMOXICILLIN 250 MG
1 CAPSULE ORAL NIGHTLY PRN
Status: DISCONTINUED | OUTPATIENT
Start: 2020-05-28 | End: 2020-05-29 | Stop reason: HOSPADM

## 2020-05-28 RX ORDER — HYDROCORTISONE 25 MG/G
CREAM TOPICAL 3 TIMES DAILY PRN
Status: DISCONTINUED | OUTPATIENT
Start: 2020-05-28 | End: 2020-05-28

## 2020-05-28 RX ORDER — ADHESIVE BANDAGE
30 BANDAGE TOPICAL 2 TIMES DAILY PRN
Status: DISCONTINUED | OUTPATIENT
Start: 2020-05-29 | End: 2020-05-29 | Stop reason: HOSPADM

## 2020-05-28 RX ORDER — HYDROMORPHONE HYDROCHLORIDE 2 MG/ML
1 INJECTION, SOLUTION INTRAMUSCULAR; INTRAVENOUS; SUBCUTANEOUS ONCE
Status: COMPLETED | OUTPATIENT
Start: 2020-05-28 | End: 2020-05-28

## 2020-05-28 RX ORDER — ONDANSETRON 8 MG/1
8 TABLET, ORALLY DISINTEGRATING ORAL EVERY 8 HOURS PRN
Status: DISCONTINUED | OUTPATIENT
Start: 2020-05-28 | End: 2020-05-29 | Stop reason: HOSPADM

## 2020-05-28 RX ORDER — SIMETHICONE 80 MG
1 TABLET,CHEWABLE ORAL EVERY 6 HOURS PRN
Status: DISCONTINUED | OUTPATIENT
Start: 2020-05-28 | End: 2020-05-29 | Stop reason: HOSPADM

## 2020-05-28 RX ORDER — OXYCODONE HYDROCHLORIDE 5 MG/1
10 TABLET ORAL EVERY 4 HOURS PRN
Status: DISCONTINUED | OUTPATIENT
Start: 2020-05-28 | End: 2020-05-29 | Stop reason: HOSPADM

## 2020-05-28 RX ORDER — HYDROCODONE BITARTRATE AND ACETAMINOPHEN 10; 325 MG/1; MG/1
1 TABLET ORAL EVERY 4 HOURS PRN
Status: DISCONTINUED | OUTPATIENT
Start: 2020-05-28 | End: 2020-05-28

## 2020-05-28 RX ORDER — SODIUM CHLORIDE, SODIUM LACTATE, POTASSIUM CHLORIDE, CALCIUM CHLORIDE 600; 310; 30; 20 MG/100ML; MG/100ML; MG/100ML; MG/100ML
INJECTION, SOLUTION INTRAVENOUS CONTINUOUS
Status: DISCONTINUED | OUTPATIENT
Start: 2020-05-28 | End: 2020-05-29 | Stop reason: HOSPADM

## 2020-05-28 RX ORDER — KETOROLAC TROMETHAMINE 30 MG/ML
30 INJECTION, SOLUTION INTRAMUSCULAR; INTRAVENOUS EVERY 6 HOURS
Status: DISPENSED | OUTPATIENT
Start: 2020-05-28 | End: 2020-05-29

## 2020-05-28 RX ORDER — OXYCODONE HYDROCHLORIDE 5 MG/1
5 TABLET ORAL EVERY 6 HOURS PRN
Status: DISCONTINUED | OUTPATIENT
Start: 2020-05-28 | End: 2020-05-28

## 2020-05-28 RX ORDER — DIPHENHYDRAMINE HCL 25 MG
25 CAPSULE ORAL EVERY 4 HOURS PRN
Status: DISCONTINUED | OUTPATIENT
Start: 2020-05-28 | End: 2020-05-29 | Stop reason: HOSPADM

## 2020-05-28 RX ORDER — IPRATROPIUM BROMIDE AND ALBUTEROL SULFATE 2.5; .5 MG/3ML; MG/3ML
3 SOLUTION RESPIRATORY (INHALATION) EVERY 4 HOURS PRN
Status: DISCONTINUED | OUTPATIENT
Start: 2020-05-28 | End: 2020-05-29 | Stop reason: HOSPADM

## 2020-05-28 RX ORDER — HYDROCODONE BITARTRATE AND ACETAMINOPHEN 5; 325 MG/1; MG/1
1 TABLET ORAL EVERY 4 HOURS PRN
Status: DISCONTINUED | OUTPATIENT
Start: 2020-05-28 | End: 2020-05-28

## 2020-05-28 RX ORDER — BISACODYL 10 MG
10 SUPPOSITORY, RECTAL RECTAL ONCE AS NEEDED
Status: DISCONTINUED | OUTPATIENT
Start: 2020-05-28 | End: 2020-05-28

## 2020-05-28 RX ORDER — PRENATAL WITH FERROUS FUM AND FOLIC ACID 3080; 920; 120; 400; 22; 1.84; 3; 20; 10; 1; 12; 200; 27; 25; 2 [IU]/1; [IU]/1; MG/1; [IU]/1; MG/1; MG/1; MG/1; MG/1; MG/1; MG/1; UG/1; MG/1; MG/1; MG/1; MG/1
1 TABLET ORAL DAILY
Status: DISCONTINUED | OUTPATIENT
Start: 2020-05-28 | End: 2020-05-29 | Stop reason: HOSPADM

## 2020-05-28 RX ORDER — HYDROMORPHONE HYDROCHLORIDE 2 MG/ML
1 INJECTION, SOLUTION INTRAMUSCULAR; INTRAVENOUS; SUBCUTANEOUS
Status: DISCONTINUED | OUTPATIENT
Start: 2020-05-28 | End: 2020-05-29 | Stop reason: HOSPADM

## 2020-05-28 RX ORDER — KETOROLAC TROMETHAMINE 30 MG/ML
30 INJECTION, SOLUTION INTRAMUSCULAR; INTRAVENOUS EVERY 6 HOURS
Status: DISCONTINUED | OUTPATIENT
Start: 2020-05-28 | End: 2020-05-28

## 2020-05-28 RX ADMIN — HYDROMORPHONE HYDROCHLORIDE 1 MG: 2 INJECTION, SOLUTION INTRAMUSCULAR; INTRAVENOUS; SUBCUTANEOUS at 01:05

## 2020-05-28 RX ADMIN — SODIUM CHLORIDE, SODIUM LACTATE, POTASSIUM CHLORIDE, AND CALCIUM CHLORIDE: .6; .31; .03; .02 INJECTION, SOLUTION INTRAVENOUS at 06:05

## 2020-05-28 RX ADMIN — PROMETHAZINE HYDROCHLORIDE 12.5 MG: 25 INJECTION INTRAMUSCULAR; INTRAVENOUS at 03:05

## 2020-05-28 RX ADMIN — KETOROLAC TROMETHAMINE 30 MG: 30 INJECTION, SOLUTION INTRAMUSCULAR at 09:05

## 2020-05-28 RX ADMIN — DOCUSATE SODIUM 200 MG: 100 CAPSULE, LIQUID FILLED ORAL at 08:05

## 2020-05-28 RX ADMIN — OXYCODONE HYDROCHLORIDE 10 MG: 5 TABLET ORAL at 09:05

## 2020-05-28 RX ADMIN — HYDROMORPHONE HYDROCHLORIDE 1 MG: 2 INJECTION, SOLUTION INTRAMUSCULAR; INTRAVENOUS; SUBCUTANEOUS at 03:05

## 2020-05-28 RX ADMIN — IOHEXOL 100 ML: 350 INJECTION, SOLUTION INTRAVENOUS at 02:05

## 2020-05-28 RX ADMIN — OXYCODONE HYDROCHLORIDE 10 MG: 5 TABLET ORAL at 12:05

## 2020-05-28 RX ADMIN — PROMETHAZINE HYDROCHLORIDE 12.5 MG: 25 INJECTION INTRAMUSCULAR; INTRAVENOUS at 01:05

## 2020-05-28 RX ADMIN — SODIUM CHLORIDE, SODIUM LACTATE, POTASSIUM CHLORIDE, AND CALCIUM CHLORIDE 1000 ML: .6; .31; .03; .02 INJECTION, SOLUTION INTRAVENOUS at 03:05

## 2020-05-28 RX ADMIN — OXYCODONE HYDROCHLORIDE 5 MG: 5 TABLET ORAL at 05:05

## 2020-05-28 RX ADMIN — KETOROLAC TROMETHAMINE 30 MG: 30 INJECTION, SOLUTION INTRAMUSCULAR at 03:05

## 2020-05-28 NOTE — HPI
Ann Godfrey is a 39 y.o. POD#7 from [facility] with complaints of abdominal pain. Patient came via EMS. She stated that the pain started at 0000 and woke her up from sleep. She rated the pain as a 10 out of 10. She stated that it is in her RLQ and radiates to there back. She complained some nausea, but no vomiting.  She denied any other associated symptoms apart from those listed above. Upon admission to the OB ED the patient was found to be in significant discomfort requiring multiple doses of IV narcotics.  A history workup was significant for mildly elevated liver enzymes consistent with a known diagnosis of fatty liver disease.  Her vital signs were normal and a CT scan was performed.  CT imaging raise concern for bilateral rectus muscle hematomas with more significant burden on the left than the right.  Given the patient's clinical discomfort and imaging findings the patient was admitted for observation and pain control.

## 2020-05-28 NOTE — SUBJECTIVE & OBJECTIVE
OB History    Para Term  AB Living   4 3 3 0 1 3   SAB TAB Ectopic Multiple Live Births   0 0 1 0 3      # Outcome Date GA Lbr King/2nd Weight Sex Delivery Anes PTL Lv   4 Term 20 37w0d  3.55 kg (7 lb 13.2 oz) F CS-LTranv Spinal N GAUDENCIO      Name: NICOLE,GIRL KAYTI      Apgar1: 9  Apgar5: 9   3 Term 10/10/17 39w0d  4.451 kg (9 lb 13 oz) F CS-LTranv Spinal, EPI N GAUDENCIO      Name: NICOLE, GIRL KAYTI      Apgar1: 6  Apgar5: 9   2 Ectopic 2015     ECTOPIC      1 Term 14 40w5d  4.326 kg (9 lb 8.6 oz) F CS-LTranv EPI N GAUDENCIO      Apgar1: 9  Apgar5: 9     Past Medical History:   Diagnosis Date    Arthritis     spinal    Asthma     was on advair , resolved    Benign tumor of spinal cord     Depression     Ectopic pregnancy     GERD (gastroesophageal reflux disease)     Pre-diabetes     Urticaria      Past Surgical History:   Procedure Laterality Date    APPENDECTOMY       SECTION      x2     SECTION WITH TUBAL LIGATION Bilateral 2020    Procedure:  SECTION, WITH TUBAL LIGATION;  Surgeon: Loyd Piedra MD;  Location: [facility], [place], operated by [facility] L&D;  Service: OB/GYN;  Laterality: Bilateral;  Pt is hx cs x2, repeat at 37 wga due to Antibody E titers 1:64. M recommended delivery at 37wga    ECTOPIC PREGNANCY SURGERY      posteriorlaminectomy      For benign spinal tumor;        PTA Medications   Medication Sig    albuterol (PROAIR HFA) 90 mcg/actuation inhaler Inhale 2 puffs into the lungs every 6 (six) hours as needed for Wheezing or Shortness of Breath. Rescue    ferrous sulfate 325 (65 FE) MG EC tablet Take 1 tablet (325 mg total) by mouth once daily.    HYDROcodone-acetaminophen (NORCO) 5-325 mg per tablet Take 1 tablet by mouth every 6 (six) hours as needed.    ibuprofen (ADVIL,MOTRIN) 600 MG tablet Take 1 tablet (600 mg total) by mouth every 6 (six) hours as needed.    prenatal vit,calc76/iron/folic (PNV 29-1 ORAL) Take by mouth.       Review of patient's  allergies indicates:  No Known Allergies     Family History     Problem Relation (Age of Onset)    Hepatitis Father    Hypertension Mother        Tobacco Use    Smoking status: Former Smoker     Packs/day: 1.00     Years: 13.00     Pack years: 13.00     Types: Cigarettes     Last attempt to quit: 10/20/2012     Years since quittin.6    Smokeless tobacco: Former User   Substance and Sexual Activity    Alcohol use: No     Frequency: 2-4 times a month     Drinks per session: 1 or 2     Binge frequency: Never    Drug use: No    Sexual activity: Yes     Partners: Male     Birth control/protection: None     Review of Systems   Constitutional: Negative for chills and fever.   Eyes: Negative for visual disturbance.   Respiratory: Negative for shortness of breath.    Cardiovascular: Negative for chest pain.   Gastrointestinal: Positive for abdominal pain and nausea. Negative for diarrhea and vomiting.   Endocrine: Negative.    Genitourinary: Negative for dysuria, hematuria, vaginal bleeding and vaginal discharge.   Integumentary:  Negative for rash.   Neurological: Negative for headaches.   Psychiatric/Behavioral: Negative.    Breast: negative.       Objective:     Vital Signs (Most Recent):  Temp: 98.3 °F (36.8 °C) (20 0545)  Pulse: 80 (20 0545)  Resp: 20 (20 0545)  BP: (!) 115/59 (20 0545)  SpO2: 98 % (20 0545) Vital Signs (24h Range):  Temp:  [97.9 °F (36.6 °C)-98.3 °F (36.8 °C)] 98.3 °F (36.8 °C)  Pulse:  [73-84] 80  Resp:  [16-20] 20  SpO2:  [94 %-99 %] 98 %  BP: (103-135)/(56-80) 115/59        There is no height or weight on file to calculate BMI.    Patient's last menstrual period was 2019 (exact date).    Physical Exam:   Constitutional: She is oriented to person, place, and time. She appears well-developed and well-nourished. No distress.   Patient appears to be in significant pain but is in no imminent clinical distress.     HENT:   Head: Normocephalic and atraumatic.     Eyes: Conjunctivae are normal.    Neck: Neck supple.    Cardiovascular: Normal rate, regular rhythm and intact distal pulses.     Pulmonary/Chest: Effort normal. No respiratory distress.        Abdominal: Soft. She exhibits no distension and no abdominal incision (Incision well healing. No gross signs of infection or erythema. ). There is tenderness (TTP in all quadrants.). There is guarding (Voluntary). There is no rebound.             Musculoskeletal: Normal range of motion and moves all extremeties.       Neurological: She is alert and oriented to person, place, and time.    Skin: Skin is warm and dry. She is not diaphoretic.    Psychiatric: She has a normal mood and affect. Her behavior is normal. Judgment and thought content normal.       Laboratory:  CBC:   Recent Labs   Lab 05/28/20  0115   WBC 6.79   RBC 3.26*   HGB 9.6*   HCT 29.8*      MCV 91   MCH 29.4   MCHC 32.2     CMP:   Recent Labs   Lab 05/28/20  0115   *   CALCIUM 8.7   ALBUMIN 3.0*   PROT 6.2      K 4.4   CO2 20*      BUN 14   CREATININE 0.6   ALKPHOS 79   ALT 88*   *   BILITOT 0.4     Amylase/Lipase - WNL    I have personallly reviewed all pertinent lab results from the last 24 hours.    Diagnostic Results:  CT: Reviewed    FINDINGS:  Lower Chest:    Mild dependent bibasilar subsegmental atelectasis.  No focal consolidation.  Heart size is normal.  No pleural or pericardial effusion.    Abdomen:    Liver is enlarged, measuring approximately 24 cm in craniocaudal length.  Mild diffuse hypoattenuation of the hepatic parenchyma could reflect steatosis.  Gallbladder is unremarkable. No intrahepatic biliary ductal dilatation.    Spleen is mildly enlarged.  Adrenal glands and pancreas are unremarkable.    The kidneys are symmetric.  No hydronephrosis. Possible punctate nonobstructing stone in the lower pole of the right kidney versus early excretion of contrast.    No evidence of small-bowel obstruction.  Suture  material adjacent to the cecum suggestive of prior appendectomy.    Small amount of free fluid noted in the left pericolic gutter.  No pneumoperitoneum or organized fluid collection.    No bulky lymphadenopathy.    Abdominal aorta is normal in caliber.    Portal, splenic, and superior mesenteric veins are patent.    Pelvis:    Urinary bladder is unremarkable.  There are postoperative changes of recent  section with enlargement of the uterus and heterogeneous attenuation suggestive of postpartum state.  Small low-density cystic structure noted in the right adnexa, likely a simple ovarian cyst.  Small amount of pelvic free fluid.  Rectum is unremarkable.  No pelvic lymphadenopathy.    Bones and soft tissues:    No aggressive osseous lesion.  Mild degenerative changes in the lower lumbar spine.  Postoperative changes in the abdominal wall from recent  section.  Asymmetric enlargement and heterogeneous appearance of the right rectus abdominis muscle measuring up to 6 cm in thickness suggestive of intramuscular hematoma.  Mild enlargement of the left abdominal oblique muscle and left lower rectus muscle suggestive of intramuscular hematoma in this region as well.  Linear hyperdense structure in the left oblique muscle (axial images ) could represent a prominent vessel, though small amount of venous hemorrhage is difficult to exclude in the absence of precontrast and delayed phase images.  Minimal gas also noted in the abdominal wall.      Impression       Postoperative changes of recent  section with prominent intramuscular hematomas within the rectus abdominis muscles and left oblique muscle.  Linear hyperdense structure in the left oblique muscle could reflect a prominent vessel, though small amount of venous hemorrhage is difficult to exclude in the absence of precontrast and delayed phase images.  Recommend correlation with clinical findings and short-term follow-up as  appropriate.    Small volume abdominopelvic free fluid, possibly blood products.    Possible nonobstructing right renal stone.    Hepatomegaly and hepatic steatosis.

## 2020-05-28 NOTE — ASSESSMENT & PLAN NOTE
- Patient POD#7 s/p RLTCS with clinically significant pain on exam  - Etiology of pain not precisely known  - No infectious clinical or laboratory findings  - CT Imaging concerning for rectus muscles hematomas, left>right which may be etiology of patient's complaints  - No clinical signs of brisk arterial bleed but will re-check CBC in 6-8 hrs  - Given patient's significant pain in spite of IV pain medication in OB ED, will admit to MBU for observation and pain control  - Will keep patient NPO with IVF while further evaluation and observation is continued  - IR evacuation of hematoma could possibly be considered in pain control can not be obtained  - Toradol q 6 for 4 doses with OXY IR 5/10 with dilaudid for breakthrough

## 2020-05-28 NOTE — H&P
[facility]  Obstetrics & Gynecology  History & Physical    Patient Name: Ann Godfrey  MRN: [id number]  Admission Date: 2020  Primary Care Provider: Radha Mckeon MD    Subjective:     Chief Complaint/Reason for Admission: Abdominal Pain in the Post-Operative Period    History of Present Illness:  Ann Godfrey is a 39 y.o. POD#7 from [facility] with complaints of abdominal pain. Patient came via EMS. She stated that the pain started at 0000 and woke her up from sleep. She rated the pain as a 10 out of 10. She stated that it is in her RLQ and radiates to there back. She complained some nausea, but no vomiting.  She denied any other associated symptoms apart from those listed above. Upon admission to the OB ED the patient was found to be in significant discomfort requiring multiple doses of IV narcotics.  A history workup was significant for mildly elevated liver enzymes consistent with a known diagnosis of fatty liver disease.  Her vital signs were normal and a CT scan was performed.  CT imaging raise concern for bilateral rectus muscle hematomas with more significant burden on the left than the right.  Given the patient's clinical discomfort and imaging findings the patient was admitted for observation and pain control.         OB History    Para Term  AB Living   4 3 3 0 1 3   SAB TAB Ectopic Multiple Live Births   0 0 1 0 3      # Outcome Date GA Lbr King/2nd Weight Sex Delivery Anes PTL Lv   4 Term 20 37w0d  3.55 kg (7 lb 13.2 oz) F CS-LTranv Spinal N GAUDENCIO      Name: RUBIN GODFREY      Apgar1: 9  Apgar5: 9   3 Term 10/10/17 39w0d  4.451 kg (9 lb 13 oz) F CS-LTranv Spinal, EPI N GAUDENCIO      Name: RUBIN GODFREY      Apgar1: 6  Apgar5: 9   2 Ectopic 2015     ECTOPIC      1 Term 14 40w5d  4.326 kg (9 lb 8.6 oz) F CS-LTranv EPI N GAUDENCIO      Apgar1: 9  Apgar5: 9     Past Medical History:   Diagnosis Date    Arthritis     spinal    Asthma     was  on advair , resolved    Benign tumor of spinal cord     Depression     Ectopic pregnancy     GERD (gastroesophageal reflux disease)     Pre-diabetes     Urticaria      Past Surgical History:   Procedure Laterality Date    APPENDECTOMY       SECTION      x2     SECTION WITH TUBAL LIGATION Bilateral 2020    Procedure:  SECTION, WITH TUBAL LIGATION;  Surgeon: Loyd Piedra MD;  Location: [facility] L&D;  Service: OB/GYN;  Laterality: Bilateral;  Pt is hx cs x2, repeat at 37 wga due to Antibody E titers 1:64. M recommended delivery at 37wga    ECTOPIC PREGNANCY SURGERY      posteriorlaminectomy      For benign spinal tumor;        PTA Medications   Medication Sig    albuterol (PROAIR HFA) 90 mcg/actuation inhaler Inhale 2 puffs into the lungs every 6 (six) hours as needed for Wheezing or Shortness of Breath. Rescue    ferrous sulfate 325 (65 FE) MG EC tablet Take 1 tablet (325 mg total) by mouth once daily.    HYDROcodone-acetaminophen (NORCO) 5-325 mg per tablet Take 1 tablet by mouth every 6 (six) hours as needed.    ibuprofen (ADVIL,MOTRIN) 600 MG tablet Take 1 tablet (600 mg total) by mouth every 6 (six) hours as needed.    prenatal vit,calc76/iron/folic (PNV 29-1 ORAL) Take by mouth.       Review of patient's allergies indicates:  No Known Allergies     Family History     Problem Relation (Age of Onset)    Hepatitis Father    Hypertension Mother        Tobacco Use    Smoking status: Former Smoker     Packs/day: 1.00     Years: 13.00     Pack years: 13.00     Types: Cigarettes     Last attempt to quit: 10/20/2012     Years since quittin.6    Smokeless tobacco: Former User   Substance and Sexual Activity    Alcohol use: No     Frequency: 2-4 times a month     Drinks per session: 1 or 2     Binge frequency: Never    Drug use: No    Sexual activity: Yes     Partners: Male     Birth control/protection: None     Review of Systems   Constitutional: Negative for chills  and fever.   Eyes: Negative for visual disturbance.   Respiratory: Negative for shortness of breath.    Cardiovascular: Negative for chest pain.   Gastrointestinal: Positive for abdominal pain and nausea. Negative for diarrhea and vomiting.   Endocrine: Negative.    Genitourinary: Negative for dysuria, hematuria, vaginal bleeding and vaginal discharge.   Integumentary:  Negative for rash.   Neurological: Negative for headaches.   Psychiatric/Behavioral: Negative.    Breast: negative.       Objective:     Vital Signs (Most Recent):  Temp: 98.3 °F (36.8 °C) (05/28/20 0545)  Pulse: 80 (05/28/20 0545)  Resp: 20 (05/28/20 0545)  BP: (!) 115/59 (05/28/20 0545)  SpO2: 98 % (05/28/20 0545) Vital Signs (24h Range):  Temp:  [97.9 °F (36.6 °C)-98.3 °F (36.8 °C)] 98.3 °F (36.8 °C)  Pulse:  [73-84] 80  Resp:  [16-20] 20  SpO2:  [94 %-99 %] 98 %  BP: (103-135)/(56-80) 115/59        There is no height or weight on file to calculate BMI.    Patient's last menstrual period was 09/09/2019 (exact date).    Physical Exam:   Constitutional: She is oriented to person, place, and time. She appears well-developed and well-nourished. No distress.   Patient appears to be in significant pain but is in no imminent clinical distress.     HENT:   Head: Normocephalic and atraumatic.    Eyes: Conjunctivae are normal.    Neck: Neck supple.    Cardiovascular: Normal rate, regular rhythm and intact distal pulses.     Pulmonary/Chest: Effort normal. No respiratory distress.        Abdominal: Soft. She exhibits no distension and no abdominal incision (Incision well healing. No gross signs of infection or erythema. ). There is tenderness (TTP in all quadrants.). There is guarding (Voluntary). There is no rebound.             Musculoskeletal: Normal range of motion and moves all extremeties.       Neurological: She is alert and oriented to person, place, and time.    Skin: Skin is warm and dry. She is not diaphoretic.    Psychiatric: She has a normal  mood and affect. Her behavior is normal. Judgment and thought content normal.       Laboratory:  CBC:   Recent Labs   Lab 20  0115   WBC 6.79   RBC 3.26*   HGB 9.6*   HCT 29.8*      MCV 91   MCH 29.4   MCHC 32.2     CMP:   Recent Labs   Lab 20  0115   *   CALCIUM 8.7   ALBUMIN 3.0*   PROT 6.2      K 4.4   CO2 20*      BUN 14   CREATININE 0.6   ALKPHOS 79   ALT 88*   *   BILITOT 0.4     Amylase/Lipase - WNL    I have personallly reviewed all pertinent lab results from the last 24 hours.    Diagnostic Results:  CT: Reviewed    FINDINGS:  Lower Chest:    Mild dependent bibasilar subsegmental atelectasis.  No focal consolidation.  Heart size is normal.  No pleural or pericardial effusion.    Abdomen:    Liver is enlarged, measuring approximately 24 cm in craniocaudal length.  Mild diffuse hypoattenuation of the hepatic parenchyma could reflect steatosis.  Gallbladder is unremarkable. No intrahepatic biliary ductal dilatation.    Spleen is mildly enlarged.  Adrenal glands and pancreas are unremarkable.    The kidneys are symmetric.  No hydronephrosis. Possible punctate nonobstructing stone in the lower pole of the right kidney versus early excretion of contrast.    No evidence of small-bowel obstruction.  Suture material adjacent to the cecum suggestive of prior appendectomy.    Small amount of free fluid noted in the left pericolic gutter.  No pneumoperitoneum or organized fluid collection.    No bulky lymphadenopathy.    Abdominal aorta is normal in caliber.    Portal, splenic, and superior mesenteric veins are patent.    Pelvis:    Urinary bladder is unremarkable.  There are postoperative changes of recent  section with enlargement of the uterus and heterogeneous attenuation suggestive of postpartum state.  Small low-density cystic structure noted in the right adnexa, likely a simple ovarian cyst.  Small amount of pelvic free fluid.  Rectum is unremarkable.  No  pelvic lymphadenopathy.    Bones and soft tissues:    No aggressive osseous lesion.  Mild degenerative changes in the lower lumbar spine.  Postoperative changes in the abdominal wall from recent  section.  Asymmetric enlargement and heterogeneous appearance of the right rectus abdominis muscle measuring up to 6 cm in thickness suggestive of intramuscular hematoma.  Mild enlargement of the left abdominal oblique muscle and left lower rectus muscle suggestive of intramuscular hematoma in this region as well.  Linear hyperdense structure in the left oblique muscle (axial images ) could represent a prominent vessel, though small amount of venous hemorrhage is difficult to exclude in the absence of precontrast and delayed phase images.  Minimal gas also noted in the abdominal wall.      Impression       Postoperative changes of recent  section with prominent intramuscular hematomas within the rectus abdominis muscles and left oblique muscle.  Linear hyperdense structure in the left oblique muscle could reflect a prominent vessel, though small amount of venous hemorrhage is difficult to exclude in the absence of precontrast and delayed phase images.  Recommend correlation with clinical findings and short-term follow-up as appropriate.    Small volume abdominopelvic free fluid, possibly blood products.    Possible nonobstructing right renal stone.    Hepatomegaly and hepatic steatosis.     Assessment/Plan:     * Generalized abdominal pain  - Patient POD#7 s/p RLTCS with clinically significant pain on exam  - Etiology of pain not precisely known  - No infectious clinical or laboratory findings  - CT Imaging concerning for rectus muscles hematomas, left>right which may be etiology of patient's complaints  - No clinical signs of brisk arterial bleed but will re-check CBC in 6-8 hrs  - Given patient's significant pain in spite of IV pain medication in OB ED, will admit to MBU for observation and pain  control  - Will keep patient NPO with IVF while further evaluation and observation is continued  - IR evacuation of hematoma could possibly be considered in pain control can not be obtained  - Toradol q 6 for 4 doses with OXY IR 5/10 with dilaudid for breakthrough      Elevated LFTs  - Patient with known history of LFTs in past  - CT imaging consistent with steathepatosis   - Will avoid Tylenol      Harlan Butt MD  Obstetrics & Gynecology  [facility]

## 2020-05-28 NOTE — ASSESSMENT & PLAN NOTE
- Patient with known history of LFTs in past  - CT imaging consistent with steathepatosis   - Will avoid Tylenol

## 2020-05-28 NOTE — ED PROVIDER NOTES
Encounter Date: 2020       History     Chief Complaint   Patient presents with    Contractions    Abdominal Cramping     Ann Godfrey is a 39 y.o. POD#7 from rLTCS/right salpingectomy with complaints of abdominal pain. Patient came via EMS. She says the pain started at 0000 and woke her up from sleep. She rates the pain as a 10 out of 10. It is in her RLQ and radiates to there back. She has some nausea, but no vomiting. Has history of appendectomy and bilateral salpingectomy. She denies dysuria. Denies fever or chills.         Review of patient's allergies indicates:  No Known Allergies  Past Medical History:   Diagnosis Date    Arthritis     spinal    Asthma     was on advair , resolved    Benign tumor of spinal cord     Depression     Ectopic pregnancy     GERD (gastroesophageal reflux disease)     Pre-diabetes     Urticaria      Past Surgical History:   Procedure Laterality Date    APPENDECTOMY       SECTION      x2     SECTION WITH TUBAL LIGATION Bilateral 2020    Procedure:  SECTION, WITH TUBAL LIGATION;  Surgeon: Loyd Piedra MD;  Location: [facility];  Service: OB/GYN;  Laterality: Bilateral;  Pt is hx cs x2, repeat at 37 wga due to Antibody E titers 1:64. MFM recommended delivery at 37wga    ECTOPIC PREGNANCY SURGERY      posteriorlaminectomy      For benign spinal tumor;      Family History   Problem Relation Age of Onset    Hypertension Mother     Hepatitis Father         cleared    Breast cancer Neg Hx     Colon cancer Neg Hx     Ovarian cancer Neg Hx     Stroke Neg Hx     Diabetes Neg Hx     Heart disease Neg Hx      Social History     Tobacco Use    Smoking status: Former Smoker     Packs/day: 1.00     Years: 13.00     Pack years: 13.00     Types: Cigarettes     Last attempt to quit: 10/20/2012     Years since quittin.6    Smokeless tobacco: Former User   Substance Use Topics    Alcohol use: No     Frequency: 2-4 times a  month     Drinks per session: 1 or 2     Binge frequency: Never    Drug use: No     Review of Systems   Constitutional: Negative for chills and fever.   HENT: Negative for facial swelling.    Eyes: Negative for visual disturbance.   Respiratory: Negative for cough, chest tightness and shortness of breath.    Cardiovascular: Negative for chest pain.   Gastrointestinal: Positive for abdominal pain (severe and radiating to the back). Negative for diarrhea, nausea and vomiting.   Genitourinary: Negative for dysuria, flank pain, pelvic pain, vaginal bleeding and vaginal discharge.   Musculoskeletal: Positive for back pain.   Skin: Negative.  Negative for rash.   Neurological: Positive for dizziness. Negative for headaches.   Psychiatric/Behavioral: Negative.        Physical Exam     Initial Vitals   BP Pulse Resp Temp SpO2   05/28/20 0107 05/28/20 0108 05/28/20 0108 05/28/20 0108 05/28/20 0108   135/80 78 18 97.9 °F (36.6 °C) 99 %      MAP       --                Physical Exam    Vitals reviewed.  Constitutional: She appears well-developed and well-nourished. She appears distressed.   HENT:   Head: Normocephalic and atraumatic.   Eyes: EOM are normal.   Neck: Normal range of motion. Neck supple.   Cardiovascular: Normal rate and regular rhythm.   Pulmonary/Chest: Breath sounds normal.   Abdominal: Soft. Bowel sounds are normal. She exhibits no distension. There is tenderness (RLQ). There is guarding (voluntary). There is no rebound.   Incision healing well, no erythema or drainage    Musculoskeletal: Normal range of motion.   Neurological: She is alert and oriented to person, place, and time.   Skin: Skin is warm and dry.   Psychiatric: She has a normal mood and affect.         ED Course   Procedures  Labs Reviewed   CBC W/ AUTO DIFFERENTIAL - Abnormal; Notable for the following components:       Result Value    RBC 3.26 (*)     Hemoglobin 9.6 (*)     Hematocrit 29.8 (*)     Immature Granulocytes 1.5 (*)     Immature  Grans (Abs) 0.10 (*)     All other components within normal limits   COMPREHENSIVE METABOLIC PANEL - Abnormal; Notable for the following components:    CO2 20 (*)     Glucose 114 (*)     Albumin 3.0 (*)      (*)     ALT 88 (*)     All other components within normal limits   ACETAMINOPHEN LEVEL - Abnormal; Notable for the following components:    Acetaminophen (Tylenol), Serum <3.0 (*)     All other components within normal limits   URINALYSIS, REFLEX TO URINE CULTURE - Abnormal; Notable for the following components:    Occult Blood UA 3+ (*)     All other components within normal limits    Narrative:     Preferred Collection Type->Urine, Clean Catch   POCT GLUCOSE - Abnormal; Notable for the following components:    POCT Glucose 212 (*)     All other components within normal limits   AMYLASE   LIPASE   ACETAMINOPHEN LEVEL   URINALYSIS MICROSCOPIC    Narrative:     Preferred Collection Type->Urine, Clean Catch   SARS-COV-2 RNA AMPLIFICATION, QUAL          Imaging Results           CT Abdomen Pelvis With Contrast (Final result)  Result time 20 02:47:05    Final result by Den Carrion MD (20 02:47:05)                 Impression:      Postoperative changes of recent  section with prominent intramuscular hematomas within the rectus abdominis muscles and left oblique muscle.  Linear hyperdense structure in the left oblique muscle could reflect a prominent vessel, though small amount of venous hemorrhage is difficult to exclude in the absence of precontrast and delayed phase images.  Recommend correlation with clinical findings and short-term follow-up as appropriate.    Small volume abdominopelvic free fluid, possibly blood products.    Possible nonobstructing right renal stone.    Hepatomegaly and hepatic steatosis.    This report was flagged in Epic as abnormal.      Electronically signed by: Den Carrion MD  Date:    2020  Time:    02:47             Narrative:     EXAMINATION:  CT ABDOMEN PELVIS WITH CONTRAST    CLINICAL HISTORY:  Abdominal pain, unspecified;    TECHNIQUE:  Low dose axial images, sagittal and coronal reformations were obtained from the lung bases to the pubic symphysis following the IV administration of 100 mL of Omnipaque 350 .  Oral contrast was not given.    COMPARISON:  Abdominal ultrasound, 2020.    FINDINGS:  Lower Chest:    Mild dependent bibasilar subsegmental atelectasis.  No focal consolidation.  Heart size is normal.  No pleural or pericardial effusion.    Abdomen:    Liver is enlarged, measuring approximately 24 cm in craniocaudal length.  Mild diffuse hypoattenuation of the hepatic parenchyma could reflect steatosis.  Gallbladder is unremarkable. No intrahepatic biliary ductal dilatation.    Spleen is mildly enlarged.  Adrenal glands and pancreas are unremarkable.    The kidneys are symmetric.  No hydronephrosis. Possible punctate nonobstructing stone in the lower pole of the right kidney versus early excretion of contrast.    No evidence of small-bowel obstruction.  Suture material adjacent to the cecum suggestive of prior appendectomy.    Small amount of free fluid noted in the left pericolic gutter.  No pneumoperitoneum or organized fluid collection.    No bulky lymphadenopathy.    Abdominal aorta is normal in caliber.    Portal, splenic, and superior mesenteric veins are patent.    Pelvis:    Urinary bladder is unremarkable.  There are postoperative changes of recent  section with enlargement of the uterus and heterogeneous attenuation suggestive of postpartum state.  Small low-density cystic structure noted in the right adnexa, likely a simple ovarian cyst.  Small amount of pelvic free fluid.  Rectum is unremarkable.  No pelvic lymphadenopathy.    Bones and soft tissues:    No aggressive osseous lesion.  Mild degenerative changes in the lower lumbar spine.  Postoperative changes in the abdominal wall from recent   section.  Asymmetric enlargement and heterogeneous appearance of the right rectus abdominis muscle measuring up to 6 cm in thickness suggestive of intramuscular hematoma.  Mild enlargement of the left abdominal oblique muscle and left lower rectus muscle suggestive of intramuscular hematoma in this region as well.  Linear hyperdense structure in the left oblique muscle (axial images ) could represent a prominent vessel, though small amount of venous hemorrhage is difficult to exclude in the absence of precontrast and delayed phase images.  Minimal gas also noted in the abdominal wall.                                 Medical Decision Making:   ED Management:  Dilaudid 1 mg IV X 2 for pain in the SEVEN, IVF bolus  Vitals stable, O2 sats %, no fever, no tachycardia  CBC, CMP, Amylase/Lipase - H/H stable, mild elevation in tranaminases  CT abdomen w/ contrast  CBC: 10/11/36/110  AST/ALT: 88/133 > likely secondary to her steatohepatitis   Cr: 0.6    CT Abdomen: 6 cm right rectus hematoma, fatty liver, GB/aorta/small bowel nl.  Small simple right ovarian cyst, nl post operative changed  Admit for pain control/repeat labs      Postoperative changes of recent  section with prominent intramuscular hematomas within the rectus abdominis muscles and left oblique muscle.  Linear hyperdense structure in the left oblique muscle could reflect a prominent vessel, though small amount of venous hemorrhage is difficult to exclude in the absence of precontrast and delayed phase images.  Recommend correlation with clinical findings and short-term follow-up as appropriate.    Small volume abdominopelvic free fluid, possibly blood products.    Possible nonobstructing right renal stone.    Hepatomegaly and hepatic steatosis.    This report was flagged in Epic as abnormal.      Electronically signed by: Den Carrion MD  Date: 2020  Time: 02:47    Repeat Dilaudid given.   Will admit patient for observation and pain  management.         Other:   I have discussed this case with another health care provider.       <> Summary of the Discussion: Dr. Flores              Attending Attestation:   Physician Attestation Statement for Resident:  As the supervising MD   Physician Attestation Statement: I have personally seen and examined this patient.   I agree with the above history. -:   As the supervising MD I agree with the above PE.    As the supervising MD I agree with the above treatment, course, plan, and disposition.   -: Patient evaluated and found to be stable, agree with resident's assessment and plan.  I was personally present during the entire procedure.  I have reviewed and agree with the residents interpretation of the following: lab data.  I have reviewed the following: old records at this facility.                    ED Course as of May 28 1904   Thu May 28, 2020   0131 S/p RLCTS/R salpingectomy on  presents with sudden onset of right sided abdominal pain radiating to her back.  Arrived via EMS.  Previous appendectomy, previous L salpingectomy for ectopic, denies fever or chills    [AR]   0354 CT and labs reviewed.  Right rectus hematoma, 6 cm, H/H stable. Elevated transaminases likely due to fatty liver. Small simple ovarian cyst. GB nl.     [AR]   0355 Repeat Dilaudid, IVF bolus. Vitals remain stable.     [AR]      ED Course User Index  [AR] Ann Flores MD                       Clinical Impression:       ICD-10-CM ICD-9-CM   1. Right lower quadrant abdominal pain R10.31 789.03   2. S/P  section Z98.891 V45.89         Disposition:   Disposition: Admitted  Condition: Stable     ED Disposition Condition    Observation                  Candy Pope M.D.  OBGYN PGY1         Ann Flores MD  20 0942

## 2020-05-28 NOTE — PROGRESS NOTES
MD to bedside for pm assessment    Pt sleeping well throughout day. Pain currently 6/10 in severity. Has not been eating today - no nausea/vomiting, just reporting decreased appetite.    Temp:  [97.9 °F (36.6 °C)-98.7 °F (37.1 °C)] 98.6 °F (37 °C)  Pulse:  [73-90] 85  Resp:  [16-20] 17  SpO2:  [94 %-99 %] 95 %  BP: (103-135)/(56-80) 106/63    A&Ox3, NAD  Nonlabored breathing  Abd soft, nondistended, + ttp, no rebound/guarding    Recent Labs   Lab 05/22/20  0405 05/28/20  0115 05/28/20  0814   WBC 11.65 6.79 10.50   HGB 9.9* 9.6* 8.5*   HCT 31.3* 29.8* 26.8*   MCV 92 91 92   * 161 175        Continue current pain control regimen  Continue reg diet    Hi Hamilton MD  PGY3, OBGYN [facility]

## 2020-05-28 NOTE — HOSPITAL COURSE
05/28/2020 - Patient admitted to Mother Baby Unit for observation and pain after presenting to the [facility] with clinical significant pain and concerning imaging for rectus muscle hematoma

## 2020-05-29 VITALS
SYSTOLIC BLOOD PRESSURE: 119 MMHG | TEMPERATURE: 98 F | DIASTOLIC BLOOD PRESSURE: 66 MMHG | RESPIRATION RATE: 18 BRPM | HEART RATE: 91 BPM | OXYGEN SATURATION: 95 %

## 2020-05-29 PROBLEM — S30.1XXA ABDOMINAL HEMATOMA: Status: ACTIVE | Noted: 2020-05-29

## 2020-05-29 PROCEDURE — 99225 PR SUBSEQUENT OBSERVATION CARE,LEVEL II: CPT | Mod: ,,, | Performed by: OBSTETRICS & GYNECOLOGY

## 2020-05-29 PROCEDURE — 25000003 PHARM REV CODE 250: Performed by: STUDENT IN AN ORGANIZED HEALTH CARE EDUCATION/TRAINING PROGRAM

## 2020-05-29 PROCEDURE — G0378 HOSPITAL OBSERVATION PER HR: HCPCS

## 2020-05-29 PROCEDURE — 99225 PR SUBSEQUENT OBSERVATION CARE,LEVEL II: ICD-10-PCS | Mod: ,,, | Performed by: OBSTETRICS & GYNECOLOGY

## 2020-05-29 RX ORDER — OXYCODONE HYDROCHLORIDE 5 MG/1
5 TABLET ORAL EVERY 4 HOURS PRN
Qty: 20 TABLET | Refills: 0 | Status: SHIPPED | OUTPATIENT
Start: 2020-05-29 | End: 2020-06-08 | Stop reason: SDUPTHER

## 2020-05-29 RX ORDER — OXYCODONE HYDROCHLORIDE 5 MG/1
5 TABLET ORAL EVERY 4 HOURS PRN
Qty: 20 TABLET | Refills: 0 | Status: SHIPPED | OUTPATIENT
Start: 2020-05-29 | End: 2020-05-29

## 2020-05-29 RX ADMIN — OXYCODONE HYDROCHLORIDE 10 MG: 5 TABLET ORAL at 03:05

## 2020-05-29 RX ADMIN — DOCUSATE SODIUM 200 MG: 100 CAPSULE, LIQUID FILLED ORAL at 08:05

## 2020-05-29 RX ADMIN — PRENATAL VIT W/ FE FUMARATE-FA TAB 27-0.8 MG 1 TABLET: 27-0.8 TAB at 08:05

## 2020-05-29 RX ADMIN — OXYCODONE HYDROCHLORIDE 5 MG: 5 TABLET ORAL at 09:05

## 2020-05-29 NOTE — PROGRESS NOTES
No acute events over night. Pain well controlled on pain medication. Patient tolerated diet overnight without nausea vomiting. Denies lightheadness, dizziness, weakness.    Temp:  [98.3 °F (36.8 °C)-98.7 °F (37.1 °C)] 98.4 °F (36.9 °C)  Pulse:  [85-90] 89  Resp:  [17-18] 18  SpO2:  [94 %-95 %] 94 %  BP: ()/(50-67) 92/50    A&Ox3, NAD  Nonlabored breathing  Abd soft, nondistended, + ttp, no rebound/guarding    Recent Labs   Lab 05/28/20  0115 05/28/20  0814   WBC 6.79 10.50   HGB 9.6* 8.5*   HCT 29.8* 26.8*   MCV 91 92    175      Assessment and Plan:  -Continue current pain control regimen  -Continue reg diet  -CBC stable, physical exam is benign    Dispo: Will plan to discharge as patient meets milestones, pain in control and no evidence of active bleeding.    Shaji Mayer MD  OBGYN PGY-1

## 2020-05-29 NOTE — PLAN OF CARE
Patient in no apparent distress. VSS. Ambulating without difficulty. No needs at this time. Discharge papers signed. All questions answered. Awaiting escort.

## 2020-06-02 NOTE — DISCHARGE SUMMARY
[facility]  Obstetrics & Gynecology  Discharge Summary    Patient Name: Ann Godfrey  MRN: [id number]  Admission Date: 5/28/2020  Hospital Length of Stay: 0 days  Discharge Date and Time: 5/29/2020 12:33 PM  Attending Physician: No att. providers found   Discharging Provider: Hi Hamilton MD  Primary Care Provider: Radha Mckeon MD    HPI: Ann Godfrey is a 39 y.o. POD#7 from [facility] with complaints of abdominal pain. Patient came via EMS. She stated that the pain started at 0000 and woke her up from sleep. She rated the pain as a 10 out of 10. She stated that it is in her RLQ and radiates to there back. She complained some nausea, but no vomiting.  She denied any other associated symptoms apart from those listed above. Upon admission to the OB ED the patient was found to be in significant discomfort requiring multiple doses of IV narcotics.  A history workup was significant for mildly elevated liver enzymes consistent with a known diagnosis of fatty liver disease.  Her vital signs were normal and a CT scan was performed.  CT imaging raise concern for bilateral rectus muscle hematomas with more significant burden on the left than the right.  Given the patient's clinical discomfort and imaging findings the patient was admitted for observation and pain control.     Hospital Course: Pt admitted for observation and pain control. Did well throughout admission and was discharged home on HD#2.     * No surgery found *     Consults:     Significant Diagnostic Studies: Labs: CBC No results for input(s): WBC, HGB, HCT, PLT in the last 48 hours.    Pending Diagnostic Studies:     None        Final Active Diagnoses:    Diagnosis Date Noted POA    PRINCIPAL PROBLEM:  Generalized abdominal pain [R10.84] 05/28/2020 Yes    Abdominal hematoma [S30.1XXA] 05/29/2020 Yes    Elevated LFTs [R94.5] 05/28/2020 Yes    Abdominal pain, nausea, vomiting [R10.9] 03/03/2020 Yes      Problems Resolved  During this Admission:        Discharged Condition: good    Disposition: Home or Self Care    Follow Up:  Follow-up Information     Call [facility].    Specialty:  Obstetrics and Gynecology  Why:  As needed  Contact information:  [address]  [facility] [address] [phone]               Patient Instructions:      Diet Adult Regular     Notify your health care provider if you experience any of the following:  temperature >100.4     Notify your health care provider if you experience any of the following:  persistent nausea and vomiting or diarrhea     Notify your health care provider if you experience any of the following:  severe uncontrolled pain     Notify your health care provider if you experience any of the following:  redness, tenderness, or signs of infection (pain, swelling, redness, odor or green/yellow discharge around incision site)     Notify your health care provider if you experience any of the following:  difficulty breathing or increased cough     No dressing needed     Activity as tolerated     Medications:  Reconciled Home Medications:      Medication List      START taking these medications    oxyCODONE 5 MG immediate release tablet  Commonly known as:  ROXICODONE  Take 1 tablet (5 mg total) by mouth every 4 (four) hours as needed.        CONTINUE taking these medications    albuterol 90 mcg/actuation inhaler  Commonly known as:  PROAIR HFA  Inhale 2 puffs into the lungs every 6 (six) hours as needed for Wheezing or Shortness of Breath. Rescue     ferrous sulfate 325 (65 FE) MG EC tablet  Take 1 tablet (325 mg total) by mouth once daily.     HYDROcodone-acetaminophen 5-325 mg per tablet  Commonly known as:  NORCO  Take 1 tablet by mouth every 6 (six) hours as needed.     ibuprofen 600 MG tablet  Commonly known as:  ADVIL,MOTRIN  Take 1 tablet (600 mg total) by mouth every 6 (six) hours as needed.     PNV 29-1 ORAL  Take by mouth.            Hi Hamilton,  MD  Obstetrics & Gynecology  [facility]

## 2020-06-06 ENCOUNTER — PATIENT MESSAGE (OUTPATIENT)
Dept: OBSTETRICS AND GYNECOLOGY | Facility: CLINIC | Age: 40
End: 2020-06-06

## 2020-06-08 ENCOUNTER — PATIENT MESSAGE (OUTPATIENT)
Dept: OBSTETRICS AND GYNECOLOGY | Facility: CLINIC | Age: 40
End: 2020-06-08

## 2020-06-08 RX ORDER — OXYCODONE HYDROCHLORIDE 5 MG/1
5 TABLET ORAL EVERY 4 HOURS PRN
Qty: 20 TABLET | Refills: 0 | Status: SHIPPED | OUTPATIENT
Start: 2020-06-08

## 2020-06-08 RX ORDER — OXYCODONE HYDROCHLORIDE 5 MG/1
5 TABLET ORAL EVERY 4 HOURS PRN
Qty: 20 TABLET | Refills: 0 | OUTPATIENT
Start: 2020-06-08

## 2020-06-09 RX ORDER — HYDROCODONE BITARTRATE AND ACETAMINOPHEN 5; 325 MG/1; MG/1
1 TABLET ORAL EVERY 6 HOURS PRN
Qty: 30 TABLET | Refills: 0 | Status: SHIPPED | OUTPATIENT
Start: 2020-06-09

## 2020-06-17 ENCOUNTER — POSTPARTUM VISIT (OUTPATIENT)
Dept: OBSTETRICS AND GYNECOLOGY | Facility: CLINIC | Age: 40
End: 2020-06-17
Payer: MEDICAID

## 2020-06-17 VITALS
HEIGHT: 66 IN | DIASTOLIC BLOOD PRESSURE: 80 MMHG | WEIGHT: 252.63 LBS | BODY MASS INDEX: 40.6 KG/M2 | SYSTOLIC BLOOD PRESSURE: 120 MMHG

## 2020-06-17 PROCEDURE — 99999 PR PBB SHADOW E&M-EST. PATIENT-LVL III: ICD-10-PCS | Mod: PBBFAC,,, | Performed by: OBSTETRICS & GYNECOLOGY

## 2020-06-17 PROCEDURE — 99999 PR PBB SHADOW E&M-EST. PATIENT-LVL III: CPT | Mod: PBBFAC,,, | Performed by: OBSTETRICS & GYNECOLOGY

## 2020-06-17 PROCEDURE — 99213 OFFICE O/P EST LOW 20 MIN: CPT | Mod: PBBFAC,PO | Performed by: OBSTETRICS & GYNECOLOGY

## 2020-06-17 PROCEDURE — 59430 PR CARE AFTER DELIVERY ONLY: ICD-10-PCS | Mod: ,,, | Performed by: OBSTETRICS & GYNECOLOGY

## 2020-06-17 RX ORDER — OXYCODONE AND ACETAMINOPHEN 7.5; 325 MG/1; MG/1
1 TABLET ORAL EVERY 4 HOURS PRN
Qty: 20 TABLET | Refills: 0 | Status: SHIPPED | OUTPATIENT
Start: 2020-06-17 | End: 2021-06-17

## 2020-06-30 NOTE — PROGRESS NOTES
Subjective:       Patient ID: Ann Godfrey is a 39 y.o. female.    Chief Complaint: Postpartum Follow-up    HPI She is here for follow up after hematoma noted after c/s.  She states that her pain has improved.    Review of Systems   ROS:  GENERAL: No fever, chills, fatigability or weight loss.  VULVAR: No pain, no lesions and no itching.  VAGINAL: No relaxation, no itching, no discharge, no abnormal bleeding and no lesions.  ABDOMEN: No abdominal pain. Denies nausea. Denies vomiting. No diarrhea. No constipation  BREAST: Denies pain. No lumps. No discharge.  URINARY: No incontinence, no nocturia, no frequency and no dysuria.  CARDIOVASCULAR: No chest pain. No shortness of breath. No leg cramps.  NEUROLOGICAL: no headaches. No vision changes.       Objective:      Physical Exam  Constitutional:       Appearance: She is obese.   Abdominal:      General: There is no distension.      Palpations: There is no mass.      Tenderness: There is abdominal tenderness. There is no right CVA tenderness, left CVA tenderness, guarding or rebound.      Hernia: No hernia is present.      Comments: Incision clean, dry, and intact     Skin:     General: Skin is warm and dry.   Neurological:      Mental Status: She is alert and oriented to person, place, and time.   Psychiatric:         Mood and Affect: Mood normal.         Behavior: Behavior normal.         Thought Content: Thought content normal.         Judgment: Judgment normal.         Assessment:       1. Postpartum examination following  delivery        Plan:       Will schedule follow up pp visit

## 2020-10-05 ENCOUNTER — PATIENT MESSAGE (OUTPATIENT)
Dept: INTERNAL MEDICINE | Facility: CLINIC | Age: 40
End: 2020-10-05

## 2021-05-06 ENCOUNTER — IMMUNIZATION (OUTPATIENT)
Dept: INTERNAL MEDICINE | Facility: CLINIC | Age: 41
End: 2021-05-06
Payer: MEDICAID

## 2021-05-06 DIAGNOSIS — Z23 NEED FOR VACCINATION: Primary | ICD-10-CM

## 2021-05-06 PROCEDURE — 91300 COVID-19, MRNA, LNP-S, PF, 30 MCG/0.3 ML DOSE VACCINE: CPT | Mod: PBBFAC

## 2021-05-27 ENCOUNTER — IMMUNIZATION (OUTPATIENT)
Dept: INTERNAL MEDICINE | Facility: CLINIC | Age: 41
End: 2021-05-27
Payer: MEDICAID

## 2021-05-27 DIAGNOSIS — Z23 NEED FOR VACCINATION: Primary | ICD-10-CM

## 2021-05-27 PROCEDURE — 0002A COVID-19, MRNA, LNP-S, PF, 30 MCG/0.3 ML DOSE VACCINE: CPT | Mod: PBBFAC

## 2021-05-27 PROCEDURE — 91300 COVID-19, MRNA, LNP-S, PF, 30 MCG/0.3 ML DOSE VACCINE: CPT | Mod: PBBFAC

## 2021-06-23 PROBLEM — J45.909 MATERNAL ASTHMA COMPLICATING PREGNANCY: Status: RESOLVED | Noted: 2019-11-26 | Resolved: 2021-06-23

## 2021-06-23 PROBLEM — Z98.891 HISTORY OF CESAREAN DELIVERY: Status: RESOLVED | Noted: 2020-05-21 | Resolved: 2021-06-23

## 2021-06-23 PROBLEM — Z98.891 S/P CESAREAN SECTION: Status: RESOLVED | Noted: 2020-05-21 | Resolved: 2021-06-23

## 2021-06-23 PROBLEM — O09.529 ADVANCED MATERNAL AGE IN MULTIGRAVIDA: Status: RESOLVED | Noted: 2019-11-26 | Resolved: 2021-06-23

## 2021-06-23 PROBLEM — O34.219 HISTORY OF CESAREAN DELIVERY AFFECTING PREGNANCY: Status: RESOLVED | Noted: 2017-10-10 | Resolved: 2021-06-23

## 2021-06-23 PROBLEM — O09.90 PREGNANCY, SUPERVISION, HIGH-RISK: Status: RESOLVED | Noted: 2019-11-26 | Resolved: 2021-06-23

## 2021-06-23 PROBLEM — R74.01 TRANSAMINITIS: Status: ACTIVE | Noted: 2020-05-28

## 2021-06-23 PROBLEM — O99.210 OBESITY COMPLICATING PREGNANCY: Status: RESOLVED | Noted: 2019-11-26 | Resolved: 2021-06-23

## 2021-06-23 PROBLEM — Z30.09 UNWANTED FERTILITY: Status: RESOLVED | Noted: 2020-04-13 | Resolved: 2021-06-23

## 2021-06-23 PROBLEM — O99.519 MATERNAL ASTHMA COMPLICATING PREGNANCY: Status: RESOLVED | Noted: 2019-11-26 | Resolved: 2021-06-23

## 2021-10-04 ENCOUNTER — PATIENT MESSAGE (OUTPATIENT)
Dept: ADMINISTRATIVE | Facility: HOSPITAL | Age: 41
End: 2021-10-04

## 2022-05-30 ENCOUNTER — PATIENT MESSAGE (OUTPATIENT)
Dept: ADMINISTRATIVE | Facility: HOSPITAL | Age: 42
End: 2022-05-30
Payer: MEDICAID

## 2024-02-07 NOTE — TELEPHONE ENCOUNTER
Please call.    Given the location, I would not feel comfortable removing in clinic.  I would recommend a dermatology consult, however, they will likely not be able to see Hitesh for over a month.    I will go ahead and place a referral to dermatology through [facility].  You should receive a call to schedule.    I would recommend warm compresses to the area.  If getting worse, see me in clinic.  Otherwise, we can reassess at her follow up visit in a couple of weeks.    Liana Dubon, NP     Pt would like to know if her  can est care with you. Pt saw dr. duarte 3 years ago. Please advise